# Patient Record
Sex: FEMALE | Race: WHITE
[De-identification: names, ages, dates, MRNs, and addresses within clinical notes are randomized per-mention and may not be internally consistent; named-entity substitution may affect disease eponyms.]

---

## 2017-04-03 NOTE — PCM.OPNOTE
- General Post-Op/Procedure Note


Date of Surgery/Procedure: 04/03/17


Operative Procedure(s): Colonoscopy with Polypectomy


Findings: 





Multiple colon polyps


Moderate Sigmoid Diverticulosis


Pre Op Diagnosis: Family History of Colon Cancer.  Personal History of Colon 

Polyps


Post-Op Diagnosis: Colon Polyps.  Diverticulosis


Anesthesia Technique: MAC


Primary Surgeon: Chance Jessica


Pathology: 





Colon Polyps


Output, Urine Amount: 0


EBL in mLs: 0


Complications: None


Condition: Good

## 2017-04-03 NOTE — OR
DATE OF OPERATION:  04/03/2017

 

SURGEON:  Chance Jessica MD

 

PREOPERATIVE DIAGNOSIS:  Family history of colon cancer and personal history of

colon polyps.

 

POSTOPERATIVE DIAGNOSIS:  Colon polyps and sigmoid diverticulosis.

 

OPERATION PERFORMED:  Colonoscopy with polypectomy.

 

INDICATIONS FOR SURGERY:  This 71-year-old female comes for a colonoscopy.  Her

last colonoscopy was over 10 years ago.  She does have a known family history of

colon cancer in a first-degree relative.

 

FINDINGS:  Multiple polyps are noted during the exam.  A cluster of two small

polyps each 4-5 mm in size was noted at the hepatic flexure.  A larger

pedunculated polyp of approximately 12 mm in size was noted at the splenic

flexure.  A sessile polyp of 7 mm noted in the descending colon 50 cm from the 
anal

verge and a 5 mm sessile polyp was noted at the sigmoid region 35 cm from the

anal verge.  The patient has a moderate degree of sigmoid diverticulosis.  This

does not appear acutely inflamed.  However, there is significant acute

angulation in the sigmoid colon.

 

PROCEDURE IN DETAIL:  The patient was taken to the operating room.  She was

given intravenous sedation, and with her in the left lateral decubitus position,

digital rectal exam was performed showing no rectal masses.  The Olympus

colonoscope was inserted into the rectum.  Retroflexed examination of the rectal

canal was performed.  The scope was then carefully advanced under direct

visualization.  The scope was carefully advanced through the sigmoid region,

this was somewhat difficult because of angulation, but was able to be safely

accomplished and then the scope was further advanced into the descending colon

where the large above-described polyp is noted.  This polyp was removed with a

cautery snare, but was too large to suction through the scope into the trap.

This polyp was then left in position, and the scope was advanced through the

remaining segments of the colon until the cecum was reached.  Cecal acquisition

was confirmed by noting the normal internal cecal anatomy including the

appendiceal orifice and ileocecal valve.  The light was also noted to

transilluminate the abdominal wall in the right lower quadrant.  After carefully

examining the cecum, the scope was withdrawn back into the region of the hepatic

flexure where a cluster of two small polyps were identified.  These polyps were

removed with a cautery snare and retrieved into a polyp trap.  The scope was

then further withdrawn and examining the transverse colon, and then back into

the descending colon.  The polyps in the splenic flexure and the sigmoid colon

were removed and able to be retrieved.  As the scope was withdrawn, the large

previously disconnected polyp was visualized, held on the end of the scope with

suction and withdrawn and retrieved.  The scope was then reinserted, examination

up to the descending colon was carried out.  The polypectomy sites were

carefully examined, and no sign of complication was noted.  Examination did not

show any other pathology other than the diverticulosis.  After the entire colon

had been fully examined and all visualized polyps were removed and retrieved and

with no sign of any complicating process, the scope was removed and the patient

was then taken from the operating room in satisfactory condition.

 

ESTIMATED BLOOD LOSS:  Zero.

 

COMPLICATIONS:  None.

 

PROGNOSIS:  Good.

 

Job#: [id number]

DD: 04/03/2017 0918

DT: 04/03/2017 1338 DI/TARAH FRYE

## 2017-04-03 NOTE — PCM.PN
- General Info


Date of Service: 04/03/17





- Review of Systems


Systems Review Comment:: 





72 y/o female with history of colon polyps and family history of colon cancer 

here for colonoscopy.  Her last colonoscopy was in 1998.  She denies any recent 

symptoms or changes in bowel pattern.  She is medically stable to proceed with 

no recent changes to her health status.  She agrees to proceed accepting risks.





- Patient Data


Vitals - most recent: 


 Last Vital Signs











Temp  97.9 F   04/03/17 07:20


 


Pulse  80   04/03/17 07:20


 


Resp  16   04/03/17 07:20


 


BP  125/78   04/03/17 07:20


 


Pulse Ox  100   04/03/17 07:20











Weight - most recent: 190 lb


Med Orders - Current: 


 Current Medications





Lactated Ringer's (Ringers, Lactated)  1,000 mls @ 125 mls/hr IV ASDIRECTED WILIAM


   Last Admin: 04/03/17 08:09 Dose:  125 mls/hr


Sodium Chloride (Saline Flush)  10 ml FLUSH ASDIRECTED PRN


   PRN Reason: Keep Vein Open











- Problem List Review


Problem List Initiated/Reviewed/Updated: Yes





- My Orders


Last 24 Hours: 


My Active Orders





04/03/17 06:45


Patient Status [ADT] Routine 


Verify Patient Consent Obtain [RC] ASDIRECTED 


Lactated Ringers [Ringers, Lactated] 1,000 ml IV ASDIRECTED 


Sodium Chloride 0.9% [Saline Flush]   10 ml FLUSH ASDIRECTED PRN 


Peripheral IV Insertion Adult [OM.PC] Routine 





04/03/17 Breakfast


Nothing Per Oral Diet [DIET] 














- Assessment


Assessment:: 





Family history of colon cancer


History of colon polyps





- Plan


Plan:: 





colonoscopy

## 2021-01-01 ENCOUNTER — HOSPITAL ENCOUNTER (EMERGENCY)
Dept: HOSPITAL 7 - FB.ED | Age: 76
Discharge: HOME | End: 2021-01-01
Payer: MEDICARE

## 2021-01-01 VITALS — HEART RATE: 80 BPM | SYSTOLIC BLOOD PRESSURE: 129 MMHG | DIASTOLIC BLOOD PRESSURE: 74 MMHG

## 2021-01-01 DIAGNOSIS — Z88.5: ICD-10-CM

## 2021-01-01 DIAGNOSIS — Z90.49: ICD-10-CM

## 2021-01-01 DIAGNOSIS — E03.9: ICD-10-CM

## 2021-01-01 DIAGNOSIS — K21.9: ICD-10-CM

## 2021-01-01 DIAGNOSIS — Z79.899: ICD-10-CM

## 2021-01-01 DIAGNOSIS — Z91.018: ICD-10-CM

## 2021-01-01 DIAGNOSIS — E66.9: ICD-10-CM

## 2021-01-01 DIAGNOSIS — Z88.8: ICD-10-CM

## 2021-01-01 DIAGNOSIS — M54.42: Primary | ICD-10-CM

## 2021-01-01 DIAGNOSIS — J45.909: ICD-10-CM

## 2021-01-01 NOTE — EDM.PDOC
ED HPI GENERAL MEDICAL PROBLEM





- General


Chief Complaint: Back Pain or Injury


Stated Complaint: LOWER BACK AND SIDE PAIN


Time Seen by Provider: 01/01/21 09:04


Source of Information: Reports: Patient


History Limitations: Reports: No Limitations





- History of Present Illness


INITIAL COMMENTS - FREE TEXT/NARRATIVE: 


Melinda complains of exquisite pain on the left flank,lower back,radiation to the 

left leg.Started several days ago and has not responded to Chiropractic 

manipulation. Nothing is helping.,Any movement makes it worse. No fever,UTI 

symptoms or neurological deficits


  ** lower back


Pain Score (Numeric/FACES): 2





- Related Data


                                    Allergies











Allergy/AdvReac Type Severity Reaction Status Date / Time


 


banana [Banana] Allergy  Respiratory Verified 04/03/17 07:48





   Depression  


 


corn [[place]] Allergy  Respiratory Verified 04/03/17 07:48





   Depression  


 


iodine Allergy  Rash Verified 04/03/17 07:48


 


oxycodone [Oxycodone] Allergy  Itching Verified 04/03/17 07:48


 


shellfish derived Allergy  Rash Verified 04/03/17 07:48


 


wheat Allergy  Respiratory Verified 04/03/17 07:48





   Depression  











Home Meds: 


                                    Home Meds





Albuterol/Ipratropium [DuoNeb 3.0-0.5 MG/3 ML] 0.5 - 2.5 mg INH QID 03/31/17 

[History]


Budesonide [Pulmicort] 0.5 mg IH BID 03/31/17 [History]


Levothyroxine Sodium 50 mcg PO DAILY 03/31/17 [History]


Meloxicam 15 mg PO DAILY PRN 03/31/17 [History]


Montelukast [Singulair] 10 mg PO BEDTIME 03/31/17 [History]


Omeprazole Magnesium [Prilosec Otc] 20 mg PO DAILY 03/31/17 [History]











Past Medical History


HEENT History: Reports: Cataract


Cardiovascular History: 


Respiratory History: Reports: Asthma


Gastrointestinal History: Reports: Colon Polyp, GERD


Genitourinary History: Reports: None


OB/GYN History: Reports: Pregnancy


Musculoskeletal History: Reports: Osteoarthritis, Other (See Below)


Other Musculoskeletal History: DJD


Neurological History: Reports: None


Psychiatric History: Reports: None


Endocrine/Metabolic History: Reports: Hypothyroidism, Obesity/BMI 30+


Hematologic History: Reports: Iron Deficiency


Immunologic History: Reports: None


Oncologic (Cancer) History: Reports: None


Dermatologic History: Reports: None





- Past Surgical History


HEENT Surgical History: Reports: Cataract Surgery, Tonsillectomy


Musculoskeletal Surgical History: Reports: Arthroscopic Knee





Social & Family History





- Caffeine Use


Caffeine Use: Reports: Coffee, Tea





ED ROS GENERAL





- Review of Systems


Review Of Systems: Comprehensive ROS is negative, except as noted in HPI.





ED EXAM,LOWER BACK PAIN/INJURY





- Physical Exam


Exam: See Below


Exam Limited By: No Limitations


General Appearance: Alert


Ears: Normal External Exam


Respiratory/Chest: No Respiratory Distress


Back Exam: Normal Inspection, Muscle Spasm, Vertebral Tenderness


Extremities: Normal Inspection


Neurological: Alert, CN II-XII Intact





Course





- Vital Signs


Last Recorded V/S: 


                                Last Vital Signs











Temp  97.8 F   01/01/21 10:49


 


Pulse  80   01/01/21 10:49


 


Resp  17   01/01/21 10:49


 


BP  129/74   01/01/21 10:49


 


Pulse Ox  97   01/01/21 10:49














- Orders/Labs/Meds


Meds: 


Medications














Discontinued Medications














Generic Name Dose Route Start Last Admin





  Trade Name Makenzie  PRN Reason Stop Dose Admin


 


Hydroxyzine HCl  50 mg  01/01/21 08:58  01/01/21 09:05





  Vistaril  IM  01/01/21 08:59  50 mg





  ONETIME ONE   Administration


 


Meperidine HCl  100 mg  01/01/21 08:58  01/01/21 09:05





  Demerol  IM  01/01/21 08:59  100 mg





  ONETIME ONE   Administration














Departure





- Departure


Time of Disposition: 16:27


Disposition: Home, Self-Care 01


Condition: Good


Clinical Impression: 


 Sciatica








- Discharge Information


Instructions:  Cyclobenzaprine tablets, Chronic Back Pain


Referrals: 


Strand,Duane, MD [Primary Care Provider] - 


Forms:  ED Department Discharge


Additional Instructions: 


Take the Flexeril 1 tab 3 x day for back pain and spasm.


Follow up with your Primary Care Provider as needed.


Call if you have any questions or come back to the ER if symptoms get acutely 

worse.





Sepsis Event Note (ED)





- Evaluation


Sepsis Screening Result: No Definite Risk





- Focused Exam


Vital Signs: 


                                   Vital Signs











  Temp Pulse Resp BP Pulse Ox


 


 01/01/21 10:49  97.8 F  80  17  129/74  97


 


 01/01/21 08:58  97.6 F  87  16  152/80 H  100














- Problem List & Annotations


(1) Sciatica


SNOMED Code(s): 97243521


   Code(s): M54.30 - SCIATICA, UNSPECIFIED SIDE   Status: Acute   


Qualifiers: 


   Laterality: left   Qualified Code(s): M54.32 - Sciatica, left side   





- Problem List Review


Problem List Initiated/Reviewed/Updated: Yes





- My Orders


Last 24 Hours: 


Demerol 100 mg IM and Vistaril 50 IM





- Assessment/Plan


Plan: 


Demerol and Vistaril IM. Pain is relieved. Home with NSAIDs and Flexeril.

## 2021-01-08 ENCOUNTER — HOSPITAL ENCOUNTER (EMERGENCY)
Dept: HOSPITAL 7 - FB.ED | Age: 76
Discharge: NURSING, REHAB OR LONG-TERM CARE | End: 2021-01-08
Payer: MEDICARE

## 2021-01-08 VITALS — HEART RATE: 103 BPM | DIASTOLIC BLOOD PRESSURE: 75 MMHG | SYSTOLIC BLOOD PRESSURE: 151 MMHG

## 2021-01-08 DIAGNOSIS — E66.9: ICD-10-CM

## 2021-01-08 DIAGNOSIS — J45.909: ICD-10-CM

## 2021-01-08 DIAGNOSIS — Z88.5: ICD-10-CM

## 2021-01-08 DIAGNOSIS — S72.142A: Primary | ICD-10-CM
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
## 2021-01-08 DIAGNOSIS — Z91.048: ICD-10-CM

## 2021-01-08 DIAGNOSIS — M19.90: ICD-10-CM

## 2021-01-08 DIAGNOSIS — Z79.899: ICD-10-CM

## 2021-01-08 DIAGNOSIS — S42.212A: ICD-10-CM

## 2021-01-08 DIAGNOSIS — Z91.018: ICD-10-CM

## 2021-01-08 DIAGNOSIS — E03.9: ICD-10-CM

## 2021-01-08 DIAGNOSIS — K21.9: ICD-10-CM

## 2021-01-08 PROCEDURE — U0002 COVID-19 LAB TEST NON-CDC: HCPCS

## 2021-01-08 NOTE — EDM.PDOC
ED HPI GENERAL MEDICAL PROBLEM





- General


Time Seen by Provider: 01/08/21 11:10


Source of Information: Reports: Patient, Family


History Limitations: Reports: No Limitations





- History of Present Illness


INITIAL COMMENTS - FREE TEXT/NARRATIVE: 





c/o fall with pain in L shoulder and L hip





pt states she was stretching 11/20 and felt something pull in her L shoulder and

L hip





PCP Dr Hayes is out of town





saw Krystina Lewis who imaged her back (altho pt not sure where), no images of L 

shoulder or L hip, dx with a muscle problem, tx meloxicam and tramadol, sent to 

PT





pt took a dose of tramadol at 10p and 2a which helped, took a dose of meloxicam 

this AM





in the late morning she was leaving her apartment with her  to go to PT 

and stumbled on the steps, did not fall, aggravated the pain in her L shoulder 

and hip,  said he could not get her up for 30 minutes, then got her in 

the car with the help of neighbors. He drove to PT who sent her here to ED





pt denies back pain, says her L hip and L shoulder hurt all over, when asked to 

point with one finger at her shoulder she points to proximal humerus laterally, 

not the joint line


  ** L shoulder, L hip & thigh


Pain Score (Numeric/FACES): 10





- Related Data


                                    Allergies











Allergy/AdvReac Type Severity Reaction Status Date / Time


 


banana [Banana] Allergy  Respiratory Verified 01/08/21 11:20





   Depression  


 


corn [[place]] Allergy  Respiratory Verified 01/08/21 11:20





   Depression  


 


iodine Allergy  Rash Verified 01/08/21 11:20


 


oxycodone [Oxycodone] Allergy  Itching Verified 01/08/21 11:20


 


shellfish derived Allergy  Rash Verified 01/08/21 11:20


 


wheat Allergy  Respiratory Verified 01/08/21 11:20





   Depression  











Home Meds: 


                                    Home Meds





Albuterol/Ipratropium [DuoNeb 3.0-0.5 MG/3 ML] 0.5 - 2.5 mg INH QID 03/31/17 

[History]


Budesonide [Pulmicort] 0.5 mg IH BID 03/31/17 [History]


Levothyroxine Sodium 50 mcg PO DAILY 03/31/17 [History]


Meloxicam 15 mg PO DAILY PRN 03/31/17 [History]


Montelukast [Singulair] 10 mg PO BEDTIME 03/31/17 [History]


Omeprazole Magnesium [Prilosec Otc] 20 mg PO DAILY PRN 03/31/17 [History]


Acetaminophen [Tylenol Arthritis Pain] 1,300 mg BEDTIME 01/08/21 [History]


traMADol [Ultram] 50 mg PO Q6H PRN 01/08/21 [History]











Past Medical History


HEENT History: Reports: Cataract


Cardiovascular History: 


Respiratory History: Reports: Asthma


Gastrointestinal History: Reports: Colon Polyp, GERD


Genitourinary History: Reports: None


OB/GYN History: Reports: Pregnancy


Musculoskeletal History: Reports: Osteoarthritis, Other (See Below)


Other Musculoskeletal History: DJD


Neurological History: Reports: None


Psychiatric History: Reports: None


Endocrine/Metabolic History: Reports: Hypothyroidism, Obesity/BMI 30+


Hematologic History: Reports: Iron Deficiency


Immunologic History: Reports: None


Oncologic (Cancer) History: Reports: None


Dermatologic History: Reports: None





- Past Surgical History


HEENT Surgical History: Reports: Cataract Surgery, Tonsillectomy


Musculoskeletal Surgical History: Reports: Arthroscopic Knee





Social & Family History





- Family History


Family Medical History: No Pertinent Family History





- Caffeine Use


Caffeine Use: Reports: Coffee, Tea





ED ROS GENERAL





- Review of Systems


Review Of Systems: See Below


Constitutional: Reports: No Symptoms


HEENT: Reports: No Symptoms


Respiratory: Reports: No Symptoms


Cardiovascular: Reports: No Symptoms


Endocrine: Reports: No Symptoms


GI/Abdominal: Reports: No Symptoms


: Reports: No Symptoms


Musculoskeletal: Reports: Joint Pain


Skin: Reports: No Symptoms


Neurological: Reports: No Symptoms


Psychiatric: Reports: No Symptoms


Hematologic/Lymphatic: Reports: No Symptoms


Immunologic: Reports: No Symptoms





ED EXAM, GENERAL





- Physical Exam


Exam: See Below


Exam Limited By: No Limitations


General Appearance: Alert, WD/WN


Nose: Normal Inspection


Head: Atraumatic, Normocephalic


Neck: Normal Inspection, Supple, Non-Tender, Full Range of Motion.  No: 

Lymphadenopathy (R), Lymphadenopathy (L)


Respiratory/Chest: No Respiratory Distress, Lungs Clear


Cardiovascular: Regular Rate, Rhythm


GI/Abdominal: Soft, Non-Tender


Back Exam: Normal Inspection.  No: CVA Tenderness (R), CVA Tenderness (L)


Extremities: Normal Range of Motion, Non-Tender


Neurological: Alert, Oriented, CN II-XII Intact, Normal Cognition, No Motor

/Sensory Deficits


Psychiatric: Anxious


Skin Exam: Warm, Dry, Intact, Normal Color, No Rash


Lymphatic: No Adenopathy





Course





- Vital Signs


Last Recorded V/S: 


                                Last Vital Signs











Temp  36.7 C   01/08/21 10:56


 


Pulse  103 H  01/08/21 15:00


 


Resp  20   01/08/21 15:00


 


BP  151/75 H  01/08/21 15:00


 


Pulse Ox  96   01/08/21 15:00














- Orders/Labs/Meds


Orders: 


                               Active Orders 24 hr











 Category Date Time Status


 


 Insert Urinary Catheter [OM.PC] Q24H Care  01/08/21 13:15 Ordered


 


 Peripheral IV Insertion Adult [OM.PC] Routine Oth  01/08/21 11:37 Ordered











Labs: 


                                Laboratory Tests











  01/08/21 01/08/21 01/08/21 Range/Units





  11:40 11:40 11:40 


 


WBC  8.8    (3.0-10.3)  x10-3/uL


 


RBC  4.20    (3.60-5.20)  x10(6)uL


 


Hgb  12.4    (11.4-15.5)  g/dL


 


Hct  37.2    (34.2-48.2)  %


 


MCV  88.6    (76.7-100.5)  fL


 


MCH  29.4    (23.9-33.9)  pg


 


MCHC  33.2    (31.9-34.8)  g/dL


 


RDW  14.3    (12.3-16.5)  %


 


Plt Count  194    (151-488)  x10(3)uL


 


MPV  7.8    (7.1-12.4)  fL


 


Neut % (Auto)  74.9    (30.8-76.2)  %


 


Lymph % (Auto)  12.8 L    (18.4-52.1)  %


 


Mono % (Auto)  9.8    (4.4-15.7)  %


 


Eos % (Auto)  1.6    (0.6-8.1)  %


 


Baso % (Auto)  0.9    (0.2-1.5)  %


 


Neut # (Auto)  6.6 H    (1.5-6.3)  x10-3/uL


 


Lymph # (Auto)  1.1    (1.0-4.4)  x10-3/uL


 


Mono # (Auto)  0.9    (0.3-1.0)  x10-3/uL


 


Eos # (Auto)  0.1    (0.0-0.8)  x10-3/uL


 


Baso # (Auto)  0.1    (0.0-0.1)  x10-3/uL


 


Sodium   136   (135-145)  mmol/L


 


Potassium   4.1   (3.5-5.3)  mmol/L


 


Chloride   99 L   (100-110)  mmol/L


 


Carbon Dioxide   21   (21-32)  mmol/L


 


BUN   19 H   (7-18)  mg/dL


 


Creatinine   0.9   (0.55-1.02)  mg/dL


 


Est Cr Clr Drug Dosing   39.99   mL/min


 


Estimated GFR (MDRD)   > 60   (>60)  


 


BUN/Creatinine Ratio   21.1 H   (9-20)  


 


Glucose   122 H   ()  mg/dL


 


Calcium   10.1   (8.6-10.2)  mg/dL


 


Total Bilirubin   0.5   (0.1-1.3)  mg/dL


 


AST   44 H   (5-25)  IU/L


 


ALT   31   (12-36)  U/L


 


Alkaline Phosphatase   93   ()  IU/L


 


C-Reactive Protein    2.9 H*  (0.5-0.9)  mg/dL


 


Total Protein   7.1   (6.0-8.0)  g/dL


 


Albumin   4.1   (3.2-4.6)  g/dL


 


Globulin   3.0   g/dL


 


Albumin/Globulin Ratio   1.4   


 


Urine Color     (YELLOW)  


 


Urine Appearance     (CLEAR)  


 


Urine pH     (5.0-6.5)  


 


Ur Specific Gravity     (1.010-1.025)  


 


Urine Protein     (NEGATIVE)  mg/dL


 


Urine Glucose (UA)     (NORMAL)  mg/dL


 


Urine Ketones     (NEGATIVE)  mg/dL


 


Urine Occult Blood     (NEGATIVE)  


 


Urine Nitrite     (NEGATIVE)  


 


Urine Bilirubin     (NEGATIVE)  


 


Urine Urobilinogen     (NEGATIVE)  mg/dL


 


Ur Leukocyte Esterase     (NEGATIVE)  


 


Urine WBC     (0-5)  


 


Ur Squamous Epith Cells     (NS,R,O)  


 


Urine Bacteria     (NS)  


 


SARS-CoV-2 RNA (CATHERINE)     (NEGATIVE)  














  01/08/21 01/08/21 Range/Units





  13:10 13:30 


 


WBC    (3.0-10.3)  x10-3/uL


 


RBC    (3.60-5.20)  x10(6)uL


 


Hgb    (11.4-15.5)  g/dL


 


Hct    (34.2-48.2)  %


 


MCV    (76.7-100.5)  fL


 


MCH    (23.9-33.9)  pg


 


MCHC    (31.9-34.8)  g/dL


 


RDW    (12.3-16.5)  %


 


Plt Count    (151-488)  x10(3)uL


 


MPV    (7.1-12.4)  fL


 


Neut % (Auto)    (30.8-76.2)  %


 


Lymph % (Auto)    (18.4-52.1)  %


 


Mono % (Auto)    (4.4-15.7)  %


 


Eos % (Auto)    (0.6-8.1)  %


 


Baso % (Auto)    (0.2-1.5)  %


 


Neut # (Auto)    (1.5-6.3)  x10-3/uL


 


Lymph # (Auto)    (1.0-4.4)  x10-3/uL


 


Mono # (Auto)    (0.3-1.0)  x10-3/uL


 


Eos # (Auto)    (0.0-0.8)  x10-3/uL


 


Baso # (Auto)    (0.0-0.1)  x10-3/uL


 


Sodium    (135-145)  mmol/L


 


Potassium    (3.5-5.3)  mmol/L


 


Chloride    (100-110)  mmol/L


 


Carbon Dioxide    (21-32)  mmol/L


 


BUN    (7-18)  mg/dL


 


Creatinine    (0.55-1.02)  mg/dL


 


Est Cr Clr Drug Dosing    mL/min


 


Estimated GFR (MDRD)    (>60)  


 


BUN/Creatinine Ratio    (9-20)  


 


Glucose    ()  mg/dL


 


Calcium    (8.6-10.2)  mg/dL


 


Total Bilirubin    (0.1-1.3)  mg/dL


 


AST    (5-25)  IU/L


 


ALT    (12-36)  U/L


 


Alkaline Phosphatase    ()  IU/L


 


C-Reactive Protein    (0.5-0.9)  mg/dL


 


Total Protein    (6.0-8.0)  g/dL


 


Albumin    (3.2-4.6)  g/dL


 


Globulin    g/dL


 


Albumin/Globulin Ratio    


 


Urine Color  Yellow   (YELLOW)  


 


Urine Appearance  Clear   (CLEAR)  


 


Urine pH  7.0 H   (5.0-6.5)  


 


Ur Specific Gravity  1.020   (1.010-1.025)  


 


Urine Protein  Negative   (NEGATIVE)  mg/dL


 


Urine Glucose (UA)  Normal   (NORMAL)  mg/dL


 


Urine Ketones  50 H   (NEGATIVE)  mg/dL


 


Urine Occult Blood  Negative   (NEGATIVE)  


 


Urine Nitrite  Negative   (NEGATIVE)  


 


Urine Bilirubin  Negative   (NEGATIVE)  


 


Urine Urobilinogen  Normal   (NEGATIVE)  mg/dL


 


Ur Leukocyte Esterase  Negative   (NEGATIVE)  


 


Urine WBC  0-5   (0-5)  


 


Ur Squamous Epith Cells  Few H   (NS,R,O)  


 


Urine Bacteria  Few H   (NS)  


 


SARS-CoV-2 RNA (CATHERINE)   Negative  (NEGATIVE)  











Meds: 


Medications














Discontinued Medications














Generic Name Dose Route Start Last Admin





  Trade Name Freq  PRN Reason Stop Dose Admin


 


Hydromorphone HCl  0.5 mg  01/08/21 12:57  01/08/21 13:10





  Dilaudid  IVPUSH  01/08/21 12:58  0.5 mg





  ONETIME ONE   Administration


 


Hydromorphone HCl  0.5 mg  01/08/21 14:27  01/08/21 14:45





  Dilaudid  IVPUSH  01/08/21 14:28  0.5 mg





  ONETIME ONE   Administration


 


Sodium Chloride  1,000 mls @ 999 mls/hr  01/08/21 14:28  01/08/21 14:45





  Normal Saline  IV  01/08/21 15:28  999 mls/hr





  .BOLUS ONE   Administration


 


Acetaminophen 1,000 mg/ Premix  100 mls @ 400 mls/hr  01/08/21 14:36  01/08/21 

14:49





  IV  01/08/21 14:50  400 mls/hr





  NOW ONE   Administration


 


Morphine Sulfate  2 mg  01/08/21 11:25  01/08/21 11:33





  Morphine  IVPUSH  01/08/21 11:26  2 mg





  ONETIME ONE   Administration


 


Ondansetron HCl  4 mg  01/08/21 11:26  01/08/21 11:36





  Zofran  IVPUSH  01/08/21 11:27  4 mg





  ONETIME ONE   Administration


 


Sodium Chloride  10 ml  01/08/21 11:37  01/08/21 11:27





  Saline Flush  FLUSH   10 ml





  ASDIRECTED PRN   Administration





  Keep Vein Open  














- Re-Assessments/Exams


Free Text/Narrative Re-Assessment/Exam: 





01/08/21 14:30


Dr Johnson called back from  and accepted pt as a direct admission





mechanism of injury remains a little unclear





pt reports she twisted in bed in mid Nov and has had pain in her L shoulder and 

hip every since, she thinks "something went out of joint"





did get benefit from a muscle relaxant, tramadol and meloxicam did not seem to 

help





did see Jefferson from PT at [facility] 4d ago and 2d ago, he reported that she

 walked with a limp but was not using a walker or cane





pt had inc'd pain in L hip and shoulder today, not able to sleep much, too much 

pain to eat much (ketone 50 mg/dl in urine), she was planning on coming to ED 

after going to PT this morning but never made it to PT as she stumbled on the 

stairs when leaving the house





it sounds as if she had a fx of her L hip, then fell, did report landing on her 

L shoulder and had inc'd pain there





CRP inc'd for unclear reason, no infection on u/a (thorne placed), CxR deferred 





pt will need to be evaluated for possible pathological fx as suggested by inc'd 

CRP, may have had a partial subacute hairline fx and both locations and 

completed the fx today, no prior imaging





feeling much better after pain meds 





Departure





- Departure


Time of Disposition: 15:00


Disposition: DC/Tfer to [facility]


Condition: Fair


Clinical Impression: 


 Intertrochanteric fracture, hip, Closed fracture of left proximal humerus








- Discharge Information


*PRESCRIPTION DRUG MONITORING PROGRAM REVIEWED*: Not Applicable


*COPY OF PRESCRIPTION DRUG MONITORING REPORT IN PATIENT ALEK: Not Applicable


Referrals: 


Strand,Duane, MD [Primary Care Provider] - 


Forms:  ED Department Discharge





Sepsis Event Note (ED)





- Focused Exam


Vital Signs: 


                                   Vital Signs











  Pulse Resp BP Pulse Ox


 


 01/08/21 15:00  103 H  20  151/75 H  96














- My Orders


Last 24 Hours: 


My Active Orders





01/08/21 11:37


Peripheral IV Insertion Adult [OM.PC] Routine 





01/08/21 13:15


Insert Urinary Catheter [OM.PC] Q24H 














- Assessment/Plan


Last 24 Hours: 


My Active Orders





01/08/21 11:37


Peripheral IV Insertion Adult [OM.PC] Routine 





01/08/21 13:15


Insert Urinary Catheter [OM.PC] Q24H

## 2021-01-08 NOTE — CR
INDICATION:  Chronic pain x2 months.  Fall today.  Increased pain.  



LEFT SHOULDER:  Three images of the left shoulder were obtained and revealed a 
transverse comminuted fracture through the surgical neck of the humerus with 
medial offset of just under a centimeter of the distal fracture fragment and 
suggestion of anterior angulation and some anterior offset, possibly 1 cm of the
distal fracture fragment.  



Demineralization is suggested, compatible with osteoporosis, but should be 
correlated clinically. 



IMPRESSION:  Left humeral fracture with deformity.  

MTDD

## 2021-01-08 NOTE — CR
INDICATION:  Chronic pain x2 months.  Fell today with increased pain.  



LEFT HIP:  A single frontal view of the pelvis with frontal and lateral views of
the left hip were obtained 01/08/21 - no comparisons.  



A slightly comminuted fracture is noted at the base of the left femoral neck 
extending into the intertrochanteric area slightly.  



Mild degenerative changes are noted at the hip joints, but the joint space is 
fairly well preserved.  



Sacroiliac joints show some mild degenerative changes.  



Degenerative disk disease is suggested at L4-5 and possibly L3-4.  



No other fracture or dislocation was seen. 



IMPRESSION:  Fracture through the base of the femoral neck with mild to moderate
deformity.  

MTDD

## 2021-01-08 NOTE — CR
INDICATION:  Fall, pain in left hip.  



LUMBOSACRAL SPINE:  Frontal and lateral views of the lumbosacral spine revealed 
decreased disk space at L3-4 and L4-5 with some hypertrophic degenerative 
changes of mild to moderate degree at L3-4.  



A very minimal anterolisthesis is noted at L4-5.  



Vertebral body and disk heights were otherwise well maintained without a 
definite acute fracture or dislocation at the lumbosacral spine. 



IMPRESSION:  Degenerative changes and disk disease - no acute fracture or 
dislocation.  

MTDD

## 2021-01-12 NOTE — PCM.HP.2
H&P History of Present Illness





- General


Date of Service: 21


Admit Problem/Dx: 


                           Admission Diagnosis/Problem





Admission Diagnosis/Problem      Rehabilitation therapy








Source of Information: Patient, Old Records, Provider





- History of Present Illness


Initial Comments - Free Text/Narative: 





Melinda arrived today for swing bed admission for left humeral fracture and Left 

intertrochanteric hip fracture sustained on 2021. Had been having chronic

pain in her hip and shoulder from a fall , had been doing PT locally when 

on Friday, she stumbled on the steps, did not fall but had worsening of her pain

to point her  couldn't get her into the car. She was brought to [facility] where x-rays found left humeral fracture/left hip fractures, sent up 

to [facility], where it was surgical repaired. Hemoglobin 9.1 yesterday. No 

electrolyte abnormalities report by [facility] Hospitalist. She had her DuoNebs 

given 3 times a day in [place] but she states she takes 4 times a day at home, she

is feeling a little tight right now but not wheezing. She has been passing gas, 

but not had any stools since prior to surgery. States she wasn't able to eat 

much until yesterday. She had an immediate release Morphine 1140 this morning 

prior to leaving [place]. Left hip fracture was felt to be pathologic due to 

neoplastic disease, pathology from bone biopsy is pending, UPEP/SPEP are also 

pending. Heme/Onc saw her this morning and will be following up with them as 

outpatient. 


  ** Left Shoulder


Pain Score (Numeric/FACES): 3





  ** Left Hip


Pain Score (Numeric/FACES): 2





- Related Data


Allergies/Adverse Reactions: 


                                    Allergies











Allergy/AdvReac Type Severity Reaction Status Date / Time


 


banana [Banana] Allergy  Respiratory Verified 21 11:20





   Depression  


 


corn [[place]] Allergy  Respiratory Verified 21 11:20





   Depression  


 


iodine Allergy  Rash Verified 21 11:20


 


oxycodone [Oxycodone] Allergy  Itching Verified 21 11:20


 


shellfish derived Allergy  Rash Verified 21 11:20


 


wheat Allergy  Respiratory Verified 21 11:20





   Depression  











Home Medications: 


                                    Home Meds





Albuterol/Ipratropium [DuoNeb 3.0-0.5 MG/3 ML] 3 ml INH QID 17 [History]


Budesonide [Pulmicort] 0.5 mg IH BID 17 [History]


Levothyroxine Sodium 50 mcg PO DAILY 17 [History]


Montelukast [Singulair] 10 mg PO BEDTIME 17 [History]


Omeprazole Magnesium [Prilosec Otc] 20 mg PO DAILY PRN 17 [History]


traMADol [Ultram] 50 mg PO Q6H PRN 21 [History]


Calcium Carbonate/Vitamin D3 [Calcium 600-Vit D3 200 Tablet] 1 tab PO BID 

21 [History]


Enoxaparin Sodium [Lovenox] 40 mg SUBCUT BEDTIME 21 [History]


Ferrous Sulfate 325 mg PO DAILY 21 [History]


Morphine 15 mg PO Q4H PRN 21 [History]











Past Medical History


HEENT History: Reports: Cataract


Cardiovascular History: 


Respiratory History: Reports: Asthma


Gastrointestinal History: Reports: Colon Polyp, GERD


Genitourinary History: Reports: None


OB/GYN History: Reports: Pregnancy


Other OB/BYN History: 


Musculoskeletal History: Reports: Arthritis, Fracture (closed intertrochanteric 

fracture of left hip, left humeral fracture-21), Osteoarthritis, Other (See 

Below)


Other Musculoskeletal History: DJD


Neurological History: Reports: None


Psychiatric History: Reports: None


Endocrine/Metabolic History: Reports: Hypothyroidism, Obesity/BMI 30+


Hematologic History: Reports: Iron Deficiency


Immunologic History: Reports: None


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 

disease 21


Dermatologic History: Reports: None





- Infectious Disease History


Infectious Disease History: Reports: Chicken Pox, Measles, Mumps





- Past Surgical History


Head Surgeries/Procedures: Reports: None


HEENT Surgical History: Reports: Adenoidectomy, Cataract Surgery, Tonsillectomy


Other HEENT Surgeries/Procedures: bilat cataract


GI Surgical History: Reports: Colonoscopy


Female  Surgical History: Reports: Hysterectomy, Salpingo-Oophorectomy


Musculoskeletal Surgical History: Reports: Arthroscopic Knee, Knee Replacement, 

Other (See Below)


Other Musculoskeletal Surgeries/Procedures:: partial R knee replacement, left 

hip replacement 21, left should fracture 21





Social & Family History





- Family History


Family Medical History: No Pertinent Family History





- Caffeine Use


Caffeine Use: Reports: Coffee





H&P Review of Systems





- Review of Systems:


Review Of Systems: See Below


General: Reports: No Symptoms


HEENT: Reports: No Symptoms


Pulmonary: Reports: Other (felt tight).  Denies: Shortness of Breath, Wheezing, 

Cough


Cardiovascular: Reports: No Symptoms


Gastrointestinal: Reports: Constipation, Decreased Appetite.  Denies: Abdominal 

Pain, Diarrhea, Nausea, Vomiting


Genitourinary: Reports: No Symptoms


Musculoskeletal: Reports: Shoulder Pain, Leg Pain


Skin: Reports: Wound


Psychiatric: Reports: No Symptoms


Neurological: Reports: No Symptoms


Hematologic/Lymphatic: Reports: Anemia





Exam





- Exam


Exam: See Below





- Vital Signs


Vital Signs: 


                                Last Vital Signs











Temp  97.8 F   21 13:26


 


Pulse  104 H  21 13:26


 


Resp  18   21 13:26


 


BP  109/84   21 13:26


 


Pulse Ox  99   21 13:26














- Exam


Quality Assessment: No: Supplemental Oxygen


General: Alert, Oriented, Cooperative.  No: Mild Distress


HEENT: PERRLA, Conjunctiva Clear, EOMI, Hearing Intact, Mucosa Moist & Pink, 

Glasses


Neck: Trachea Midline


Lungs: Clear to Auscultation (tight), Normal Respiratory Effort.  No: Decreased 

Breath Sounds, Wheezing


Cardiovascular: Regular Rate, Regular Rhythm


GI/Abdominal Exam: Normal Bowel Sounds, Soft, Non-Tender, No Distention


 (Female) Exam: Deferred


Rectal (Female) Exam: Deferred


Extremities: No Pedal Edema, Other (Left arm in sling. )


Peripheral Pulses: 2+: Radial (L), Radial (R)


Skin: Warm, Dry, Wound (Aquacell in place)





Sepsis Event Note





- Focused Exam


Vital Signs: 


                                   Vital Signs











  Temp Pulse Resp BP Pulse Ox


 


 21 13:26  97.8 F  104 H  18  109/84  99














*Q Meaningful Use (ADM)





- VTE Risk Assess *Q


Each Risk Factor Represents 1 Point: Serious lung disease including pneumonia 

(asthma)


Total Score 1 Point Risk Factors: 1


Each Risk Factor Represents 2 Points: Age 60 - 74 Years, Malignancy (present or 

previous)


Total Score 2 Point Risk Factors: 4


Each Risk Factor Represents 3 Points: None


Total Score 3 Point Risk Factors: 0


Each Risk Factor Represents 5 Points: Elective Major Lower Extremity 

Arthroplasty, Hip, Pelvis or Leg Fracture, Less than 1 month


Total Score 5 Point Risk Factors: 10


Venous Thromboembolism Risk Factor Score *Q: 15





- Problem List


(1) Intertrochanteric fracture, hip


SNOMED Code(s): 212233462


   ICD Code: S72.143A - DISPLACED INTERTROCHANTERIC FRACTURE OF UNSP FEMUR, INIT

   Status: Acute   Current Visit: No   Onset Date: ~21   





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


SNOMED Code(s): 911020657


   ICD Code: M84.50XA - PATHOLOGICAL FRACTURE IN NEOPLASTIC DISEASE, UNSP SITE, 

INIT   Status: Acute   Current Visit: Yes   


Qualifiers: 


   Site of pathological fracture: hip 





(3) Closed fracture of left proximal humerus


SNOMED Code(s): 33584313


   ICD Code: S42.202A - UNSP FRACTURE OF UPPER END OF LEFT HUMERUS, INIT FOR 

CLOS FX   Status: Acute   Current Visit: No   Onset Date: ~21   





(4) Asthma, moderate persistent


SNOMED Code(s): 358718663


   ICD Code: J45.40 - MODERATE PERSISTENT ASTHMA, UNCOMPLICATED   Status: 

Chronic   Current Visit: Yes   





(5) Sciatica


SNOMED Code(s): 45605869


   ICD Code: M54.30 - SCIATICA, UNSPECIFIED SIDE   Status: Chronic   Current 

Visit: No   


Qualifiers: 


 





(6) Esophageal reflux


SNOMED Code(s): 765053984


   ICD Code: K21.9 - GASTRO-ESOPHAGEAL REFLUX DISEASE WITHOUT ESOPHAGITIS   

Status: Chronic   Current Visit: Yes   





(7) Obesity (BMI 35.0-39.9 without comorbidity)


SNOMED Code(s): 800403543, 920633315


   ICD Code: E66.9 - OBESITY, UNSPECIFIED   Status: Chronic   Current Visit: Yes

   





(8) Hypothyroidism


SNOMED Code(s): 52197012


   ICD Code: E03.9 - HYPOTHYROIDISM, UNSPECIFIED   Status: Chronic   Current 

Visit: Yes   


Problem List Initiated/Reviewed/Updated: Yes


Orders Last 24hrs: 


                               Active Orders 24 hr











 Category Date Time Status


 


 Patient Status [ADT] Routine ADT  21 13:39 Ordered


 


 Height and Weight [RC] WEEKLY Care  21 13:39 Ordered


 


 Oxygen Therapy [RC] PRN Care  21 13:39 Ordered


 


 RT Aerosol Therapy [RC] ASDIRECTED Care  21 13:44 Ordered


 


 Up With Assistance [RC] ASDIRECTED Care  21 13:38 Ordered


 


 Up ad Rachel [RC] ASDIRECTED Care  21 13:38 Ordered


 


 VTE/DVT Education [RC] Per Unit Routine Care  21 13:39 Ordered


 


 Vital Signs [RC] PER UNIT ROUTINE Care  21 13:39 Ordered


 


 Regular Diet [DIET] Diet  21 Dinner Ordered


 


 Acetaminophen [Tylenol Extra Strength] Med  21 13:45 Ordered





 500 mg PO Q6H   


 


 Albuterol/Ipratropium [DuoNeb 3.0-0.5 MG/3 ML] Med  21 17:00 Ordered





 3 ml INH QID   


 


 Budesonide [Pulmicort] Med  21 21:00 Ordered





 0.5 mg INH BID   


 


 Calcium Carbonate/Vitamin D3 [Calcium 600-Vit D3 200 Med  21 21:00 

Ordered





 Tablet]   





 1 tab PO BID   


 


 Enoxaparin [Lovenox] Med  21 21:00 Ordered





 40 mg SUBCUT BEDTIME   


 


 Ferrous Sulfate Med  21 09:00 Ordered





 325 mg PO DAILY   


 


 Ibuprofen [Motrin] Med  21 13:45 Ordered





 200 mg PO Q6H   


 


 Levothyroxine [Synthroid] Med  21 09:00 Ordered





 50 mcg PO DAILY   


 


 Montelukast [Singulair] Med  21 21:00 Ordered





 10 mg PO BEDTIME   


 


 Morphine Med  21 13:41 Ordered





 15 mg PO Q4H PRN   


 


 Omeprazole Magnesium [Prilosec Otc] Med  21 13:41 Ordered





 20 mg PO DAILY PRN   


 


 traMADol [Ultram] Med  21 13:41 Ordered





 50 mg PO Q6H PRN   


 


 Antiembolic Hose [OM.PC] Per Unit Routine Oth  21 13:40 Ordered


 


 Resuscitation Status Routine Resus Stat  21 13:38 Ordered








                                Medication Orders





Acetaminophen (Tylenol Extra Strength)  500 mg PO Q6H WILIAM


Albuterol/Ipratropium (Duoneb 3.0-0.5 Mg/3 Ml)  3 ml INH QID WILIAM


Budesonide (Pulmicort)  0.5 mg INH BID WILIAM


Enoxaparin Sodium (Lovenox)  40 mg SUBCUT BEDTIME WILIAM


   Stop: 21 21:01


Ferrous Sulfate (Ferrous Sulfate)  325 mg PO DAILY WILIAM


Ibuprofen (Motrin)  200 mg PO Q6H WILIAM


Levothyroxine Sodium (Synthroid)  50 mcg PO DAILY WILIAM


Montelukast Sodium (Singulair)  10 mg PO BEDTIME WILIAM


Morphine Sulfate (Morphine)  15 mg PO Q4H PRN


   PRN Reason: Pain


Non-Formulary Medication (Calcium Carbonate/Vitamin D3 [Calcium 600-Vit D3 200 

Tablet])  1 tab PO BID WILIAM


Non-Formulary Medication (Omeprazole Magnesium [Prilosec Otc])  20 mg PO DAILY 

PRN


   PRN Reason: Dyspepsia


Tramadol HCl (Ultram)  50 mg PO Q6H PRN


   PRN Reason: Pain








Assessment/Plan Comment:: 





1. Admit to swingbed for rehab therapies due to left humeral fracture & left hip

fracture.


2. Asthma: DuoNebs qid, Budesonide bid, Montelukast at bedtime.


3. Pain: Tylenol 500 mg qid with Ibuprofen 200 mg qid, Morphine 15 mg q6h as 

needed or Tramadol 50 mg q6h as needed breakthrough pain. Has not had stool yet,

but passing gas. Just started eating well yesterday. Senna bid, MiraLax daily as

needed constipation while on narcotics & iron.


4. Dressing: Aquacel dressing for 7 days then dry dressing to left hip. Ortho 

appt 2021 at 1030am. 


5. DVT prophylaxis: Lovenox 40 mg SQ daily, day 3/28.


6: CODE STATUS: FULL. Care conference Thursday at 1300. 





- Mortality Measure


Prognosis:: Good

## 2021-01-16 NOTE — PCM.SN.2
- Free Text/Narrative


Note: 





S: Melinda noticed painful lesion on right breast, thought it had been from a 

tight bra, red.


O: 2 cm erythematous tender boil on right breast and adjacent 1 cm erythematous 

boil, located at 9:00 on Upper outer quadrant, expressed some yellow pus from 

smaller boil, unable to express anything out of larger. 


A: Furunculosis


P: Bactrim DS 1 tab bid for 10 days. Warm compresses to right breast qid.

## 2021-01-20 NOTE — CT
INDICATION:  Myeloma workup. 



CT CERVICAL SPINE:  Spiral 2.5 mm axial sections were obtained through the 
cervical spine with sagittal and coronal reconstructions 01/20/21 - no 
comparison.  

Total exam DLP was 370.74 mGy-cm.  



Multiple low-density lesions are scattered about the vertebral bodies of the 
cervical spine, involving to one extent or another all vertebral bodies of the 
cervical spine and are compatible with multiple myeloma.  



Vertebral body heights were maintained.  



Decreased disk space is noted at C4-5 and minimally at C5-6 with hypertrophic 
changes more prominent at C4-5.  Narrowing of the C4-5 neural foramen on the 
left is noted.  



Degenerative changes with narrowing and hypertrophic spurring are noted at the 
odontoatlantian joint.  



IMPRESSION: 

1.  Multiple myeloma. 

2.  Degenerative changes and disk disease as noted above in the cervical spine. 


MTDD

## 2021-01-20 NOTE — CT
INDICATION:  Myeloma workup. 



CT RIGHT UPPER EXTREMITY WITHOUT CONTRAST:  Spiral 1.25 mm axial sections were 
obtained through the left upper extremity and revealed no definite lytic lesions
in the humerus or ulna.  There is a suspicious punched-out appearing lesion 
along the dorsum of the distal radial metaphysis at the radiocarpal joint which 
may represent a small myeloma lesion measuring approximately 6.7 mm.  No other 
lesion suspicious for myeloma were identified.  



IMPRESSION:  

Small lesion compatible with myeloma suggested at the distal metaphysis.  



Total exam DLP was 6638.26 mGy-cm (multiple runs of images were utilized to be 
able to obtain diagnostic examination). 

MTDD

## 2021-01-20 NOTE — CT
INDICATION:  Myeloma workup. 



CT LUMBOSACRAL SPINE:  Sagittal and coronal, as well as angled L3-4, L4-5, and 
L5-S1 disk level reconstructions were obtained 01/20/21 with 2.5 mm slices and 
revealed a mild dextroconcave scoliosis at the lower lumbar spine.  



What may be a myeloma lesion is noted with minimal compression anteriorly at the
superior endplate of L3 vertebral body.  Low density abnormality in the lateral 
posterior L4 vertebral body is also compatible with a myeloma lesion with 
multiple smaller lesions suggested at the L5 vertebral body.  One more 
anteriorly is more prominent at the L5 vertebral body.  Multiple additional 
lesions are noted at the L2 vertebral body.  



At the left L5 pedicle, there is a very large lesion with large soft tissue mass
present which measures approximately 29 mm craniocaudad which does not appear to
extend into the spinal canal to any degree, but may be impressing upon exiting 
nerve root.  Additionally at L4-5 there is moderate degree of spinal stenosis 
due to bulging disk and prominent ligamenta flava.  

No other spinal stenosis was seen.  



IMPRESSION: 

1.  Multiple myeloma lesions scattered about the lumbosacral spine with the 
largest most prominent abnormality at the left L5 pedicle which includes a soft 
tissue mass measuring approximately 29 mm likely impressing upon nerve roots.  

2.  Degenerative changes and disk disease with vacuum disk phenomena L2-3, L3-4,
L4-5 - hypertrophic spurring relatively mild.  

MTDD

## 2021-01-20 NOTE — CT
INDICATION:  Myeloma workup. 



CT BILATERAL LOWER EXTREMITIES WITHOUT CONTRAST:  Spiral 2.5 mm axial sections 
were obtained through the pelvis and lower extremities bilaterally with sagittal
and coronal reconstructions 01/20/21 - no comparison.  



In the anterior vertebral body of L5 and perhaps minimally at L5 posteriorly 
vertebral body there were low-density abnormalities which could represent 
myeloma lesions.  Multiple similar lesions are noted in the L4 vertebral body 
and the L3 vertebral body.  Multiple areas of low-density abnormality are noted 
in the iliac bones with somewhat mottled appearance, some of these are 
suspicious for myeloma lesions, but are not as clear cut.  



Low-density lesion at the inferior medial aspect of the right acetabulum would 
be compatible with a myeloma lesion with an additional smaller lesion more 
posteriorly also on the right.  



A hip pinning device is noted at the proximal femur on the left producing hard 
beam artifact and limiting detail in that area.  No gross lytic lesion is noted 
in that portion of the visualized left femur.  No definite lytic lesions are 
noted in the left lower extremity in general.  



Medial hemiarthroplasty is noted at the right knee with hard beam artifact in 
that area limiting detail somewhat.  

No definite focal lytic lesion is noted in the right lower extremity.  



No additional suspicious lesions were identified on these images.  



Total exam DLP was 1121.73 mGy-cm. 

MTDD

## 2021-01-20 NOTE — CT
INDICATION:  Myeloma workup.  



CT OF THE THORACIC SPINE:  2.5 mm sections were obtained through the thoracic 
spine 01/20/21 and revealed low-density lesions compatible with myeloma at T3, 
minimally at T4, minimally at T5, T6, T7 and more prominently at T8  



Overall vertebral body heights were fairly well maintained.  



Hypertrophic degenerative changes of vertebral bodies are noted anteriorly for 
the most part in the midthoracic spine extending into the lower thoracic spine 
with degenerative disk disease at mid to lower thoracic spine levels with vacuum
disk phenomena at T9-10.  

No acute fracture or dislocation was seen.  



IMPRESSION:  

1.  Multiple myeloma lesions are suggested.  

2.  Hypertrophic degenerative changes and disk disease mid to lower thoracic 
spine.  

MTDD

## 2021-01-20 NOTE — CT
INDICATION:  Myeloma workup.  



CT HEAD WITHOUT CONTRAST:  Spiral 3.75 mm axial sections were obtained through 
the brain without contrast with sagittal, coronal and axial reconstructions 
01/20/21 - no comparisons.  

Total exam DLP was 1065.61 mGy-cm.  



Retention cysts are noted in the left maxillary antrum.  The paranasal sinuses 
and mastoid air cells were well aerated otherwise.  



Multiple punched out lesions are noted scattered about the calvarium compatible 
with multiple myeloma, they vary in size with the largest in the posterior 
parietal area on the right near the convexity measuring 19 mm.  In a similar 
location on the left, a lytic lesion measured 12 mm.  



Calcifications are noted in the internal carotid arteries .  



The orbits appear to be intact.  



No shift of midline structures was identified.  



No bleeding site or hematoma was seen.  



There was suggestion of some minimal areas of low-density in the white matter 
suggesting minimal microvascular disease.  



IMPRESSION: 

1.  Multiple myeloma lesions, numerous in the calvarium, largest measuring 19 
mm.  

2.  Cerebrovascular disease with suggestion of minimal microvascular disease 
type changes in the white matter.  



MTDD

## 2021-01-20 NOTE — PCM.SN.2
- Free Text/Narrative


Note: 





Had abnormal SPEP/UPEP, oncology called requested CT whole body scan, was 

positive for Multiple myeloma in scalp, cervical spine(extensive), thoracic 

spine, L2/L4 lumbar spine, right acetabulum, left hip & humerus, right radius. 

Spoke with patient about results. Will notify her PCP of results. Anticipate she

will need outpatient oncology appointment to get started on chemotherapy as soon

as possible. High risk for further fractures if not treated. Requested for 

Father Jp to come in to see patient.

## 2021-01-20 NOTE — CT
INDICATION:  Myeloma workup.  



CT CHEST, ABDOMEN AND PELVIS WITHOUT CONTRAST:  Spiral 3.75 mm axial sections 
were obtained through the chest, abdomen and pelvis with sagittal and coronal 
reconstructions 01/20/21 - no comparison.  

Total exam DLP was 2663.71 mGy-cm.  



The thoracic and lumbar spine, as well as the pelvis was commented on earlier.  



No definite mediastinal mass was seen.  



The heart appears prominent in size and may be slightly enlarged.  

No pericardial effusion was seen. 

Relatively minimal mediastinal lymphadenopathy is noted which is nonspecific.  
An active infiltrate or nodular mass was not identified.  



No definite myeloma lesions were noted in the ribs.  



IMPRESSION: 

1.  Multiple myeloma as noted on earlier reports. 

2.  ASHD. 

3.  DJD and disk disease thoracic spine.  





CT ABDOMEN AND PELVIS:  Examination of the abdomen and pelvis was obtained by CT
as noted above with sagittal and coronal reconstructions and revealed myeloma 
lesions in bony structures as noted on previous reports.  



Minimal aortic calcification is noted with no dilatation.  



There is a low density lesion in the midpole of the left kidney, etiology 
indeterminate, especially without IV contrast.  It is not well defined and 
measured approximately 22 mm on axial image 29 on series 3.  

There are some minor cortical irregularities of the kidneys compatible with a 
minimal degree of renal cortical scarring.  

Adrenal glands appear to be unremarkable.  

The liver, gallbladder, spleen, and pancreas appear to be normal.  Common bile 
duct did not appear to be enlarged.  



No definite retroperitoneal mass was seen, although retroperitoneal 
lymphadenopathy is present, it is mild and nonspecific.  



The appendix is absent compatible with history of its removal.  



No evidence of free air or bowel obstruction was seen.  



Ascending colon diverticulosis was present without evidence of diverticulitis.  
Descending colon diverticulosis and more prominently sigmoid diverticulosis is 
present without definite evidence of diverticulitis.  



Uterus was absent compatible with history of its removal.  



Urinary bladder was unremarkable.  



No intraperitoneal or retroperitoneal mass, organomegaly, or free fluid 
collection was identified.  



Metallic densities are noted in the stomach which may represent medication - 
correlate clinically.  



IMPRESSION: 

1.  Multiple myeloma lesions scattered about the axial skeleton as noted 
previously.  

2.  Sigmoid diverticulosis without evidence of diverticulitis.  

3.  Post appendectomy. 

4.  Post hysterectomy.  

5.  Degenerative changes and disk disease and low lumbar scoliosis.  

MTDD

## 2021-01-20 NOTE — CT
INDICATION:  Myeloma workup.  



CT OF THE LEFT UPPER EXTREMITY WITHOUT CONTRAST:  Spiral 1.25 mm axial sections 
were obtained through the left upper extremity with sagittal and coronal 
reconstructions 01/20/21 and compared with recent x-rays.  

Total exam DLP was 1065.61 mGy-cm.



Comminuted pathologic is noted through the surgical neck of the humerus with 
deformity and an obvious low-density lesion compatible with multiple myeloma 
lesion.  The humeral head has a somewhat porotic appearance additionally.  No 
additional definite myeloma lesion is noted in the left humerus.  No definite 
lytic lesions were noted in the radius or ulna.  



IMPRESSION:  Comminuted pathologic fracture at the proximal humerus compatible 
with multiple myeloma pathologic fracture site.  

MTDD

## 2021-01-21 NOTE — PCM.PN
- General Info


Date of Service: 01/21/21


Subjective Update: 





Melinda had whole body CT yesterday for neoplasm workup due to pathologic 

fracture, found multiple myeloma in skull, cervical spine, thoracic spine, 

lumbar spine, right radius, left humerus, left hip, right acetabulum. Notified 

her PCP Dr Hayes's office to try to get appointment as soon as possible with 

oncology. Try to organize with orthopedics appointment. Muscle spasms are better

with Diazepam and Ice. She is sleeping better. Right breast is not draining as 

much, non tender. Prune juice upsets her stomach but was better this morning. 

Advised she has Pantoprazole rather than omeprazole here. Pain controlled with 

low dose Tylenol/Ibuprofen 500/200 mg q6h and Tramadol. Has not used Morphine so

discontinued. 





- Patient Data


Vitals - Most Recent: 


                                Last Vital Signs











Temp  97.9 F   01/21/21 06:37


 


Pulse  82   01/21/21 06:37


 


Resp  16   01/21/21 06:37


 


BP  108/52 L  01/21/21 06:37


 


Pulse Ox  96   01/21/21 06:37











Weight - Most Recent: 170 lb 6 oz


Med Orders - Current: 


                               Current Medications





Acetaminophen (Tylenol Extra Strength)  500 mg PO Q6H [facility]


   Last Admin: 01/21/21 08:11 Dose:  500 mg


   Documented by: 


Albuterol/Ipratropium (Duoneb 3.0-0.5 Mg/3 Ml)  3 ml INH 0700,1100,1500,1900 [facility]


   Last Admin: 01/21/21 10:55 Dose:  3 ml


   Documented by: 


Budesonide (Pulmicort)  0.5 mg INH BID@0700,1900 [facility]


   Last Admin: 01/21/21 06:31 Dose:  0.5 mg


   Documented by: 


Calcium Carbonate/Glycine (Oyster Shell Calcium)  500 mg PO BID [facility]


   Last Admin: 01/21/21 08:13 Dose:  500 mg


   Documented by: 


Diazepam (Valium)  2 mg PO TID PRN


   PRN Reason: Muscle Spasm


   Last Admin: 01/20/21 20:29 Dose:  2 mg


   Documented by: 


Enoxaparin Sodium (Lovenox)  40 mg SUBCUT BEDTIME [facility]


   Stop: 02/05/21 21:01


   Last Admin: 01/20/21 20:33 Dose:  40 mg


   Documented by: 


Ferrous Sulfate (Ferrous Sulfate)  325 mg PO DAILY [facility]


   Last Admin: 01/21/21 08:11 Dose:  325 mg


   Documented by: 


Ibuprofen (Motrin)  200 mg PO Q6H [facility]


   Last Admin: 01/21/21 08:12 Dose:  200 mg


   Documented by: 


Levothyroxine Sodium (Synthroid)  50 mcg PO DAILY [facility]


   Last Admin: 01/21/21 08:13 Dose:  50 mcg


   Documented by: 


Melatonin (Melatonin)  6 mg PO BEDTIME PRN


   PRN Reason: Insomnia


   Last Admin: 01/20/21 20:29 Dose:  6 mg


   Documented by: 


Montelukast Sodium (Singulair)  10 mg PO BEDTIME [facility]


   Last Admin: 01/20/21 20:28 Dose:  10 mg


   Documented by: 


Pantoprazole Sodium (Protonix***)  40 mg PO DAILY PRN


   PRN Reason: Dyspepsia


Polyethylene Glycol (Miralax)  17 gm PO DAILY PRN


   PRN Reason: CONSTIPATION


Saccharomyces Boulardii (Florastor)  250 mg PO BID [facility]


   Stop: 01/26/21 21:01


   Last Admin: 01/21/21 08:13 Dose:  250 mg


   Documented by: 


Senna/Docusate Sodium (Senna Plus)  1 tab PO BID [facility]


   Last Admin: 01/21/21 08:12 Dose:  1 tab


   Documented by: 


Tramadol HCl (Ultram)  50 mg PO Q6H PRN


   PRN Reason: Pain (4-6/10)


   Last Admin: 01/21/21 08:11 Dose:  50 mg


   Documented by: 


Trimethoprim/Sulfamethoxazole (Septra Ds)  1 tab PO BID [facility]


   Stop: 01/26/21 09:01


   Last Admin: 01/21/21 08:12 Dose:  1 tab


   Documented by: 





Discontinued Medications





Albuterol/Ipratropium (Duoneb 3.0-0.5 Mg/3 Ml)  3 ml INH QID [facility]


   Last Admin: 01/13/21 12:42 Dose:  3 ml


   Documented by: 


Budesonide (Pulmicort)  0.5 mg INH BID [facility]


   Last Admin: 01/13/21 08:56 Dose:  0.5 mg


   Documented by: 


Diazepam (Valium)  2 mg PO BEDTIME PRN


   PRN Reason: Muscle Spasm


   Last Admin: 01/18/21 21:24 Dose:  2 mg


   Documented by: 


Morphine Sulfate (Morphine)  15 mg PO Q4H PRN


   PRN Reason: Pain (7-10/10)











- Exam


General: Alert, Oriented, Cooperative, No Acute Distress


Lungs: Clear to Auscultation, Normal Respiratory Effort.  No: Wheezing


Cardiovascular: Regular Rate, Regular Rhythm


GI/Abdominal Exam: Normal Bowel Sounds, Soft, Non-Tender, No Distention


Extremities: No Pedal Edema, Other (Left arm in envelope sling)


Peripheral Pulses: 2+: Radial (L), Radial (R)


Wound/Incisions: Healing Well, Drainage (minimal drainage on band-aid, right 

breast), Erythema Improving (Right upper outer breast: slough tissue present, 

good granulation, NT)





Sepsis Event Note





- Evaluation


Sepsis Screening Result: No Definite Risk





- Focused Exam


Vital Signs: 


                                   Vital Signs











  Temp Pulse Resp BP Pulse Ox


 


 01/21/21 06:37  97.9 F  82  16  108/52 L  96


 


 01/21/21 06:31   84   














- Problem List & Annotations


(1) Intertrochanteric fracture, hip


SNOMED Code(s): 063864163


   Code(s): S72.143A - DISPLACED INTERTROCHANTERIC FRACTURE OF UNSP FEMUR, INIT 

 Status: Acute   Current Visit: No   Onset Date: ~01/08/21   





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


SNOMED Code(s): 434773407


   Code(s): M84.50XA - PATHOLOGICAL FRACTURE IN NEOPLASTIC DISEASE, UNSP SITE, 

INIT   Status: Acute   Current Visit: Yes   


Qualifiers: 


   Site of pathological fracture: hip 





(3) Multiple myeloma


SNOMED Code(s): 310653147


   Code(s): C90.00 - MULTIPLE MYELOMA NOT HAVING ACHIEVED REMISSION   Status: 

Acute   Current Visit: Yes   Onset Date: ~01/20/21   





(4) Closed fracture of left proximal humerus


SNOMED Code(s): 10062404


   Code(s): S42.202A - UNSP FRACTURE OF UPPER END OF LEFT HUMERUS, INIT FOR CLOS

FX   Status: Acute   Current Visit: No   Onset Date: ~01/08/21   





(5) Asthma, moderate persistent


SNOMED Code(s): 833541821


   Code(s): J45.40 - MODERATE PERSISTENT ASTHMA, UNCOMPLICATED   Status: Chronic

  Current Visit: Yes   





(6) Sciatica


SNOMED Code(s): 63590922


   Code(s): M54.30 - SCIATICA, UNSPECIFIED SIDE   Status: Chronic   Current 

Visit: No   


Qualifiers: 


 





(7) Esophageal reflux


SNOMED Code(s): 317421498


   Code(s): K21.9 - GASTRO-ESOPHAGEAL REFLUX DISEASE WITHOUT ESOPHAGITIS   

Status: Chronic   Current Visit: Yes   





(8) Obesity (BMI 35.0-39.9 without comorbidity)


SNOMED Code(s): 464159822, 911559121


   Code(s): E66.9 - OBESITY, UNSPECIFIED   Status: Chronic   Current Visit: Yes 

 





(9) Hypothyroidism


SNOMED Code(s): 24276051


   Code(s): E03.9 - HYPOTHYROIDISM, UNSPECIFIED   Status: Chronic   Current 

Visit: Yes   





(10) Furuncle of breast


SNOMED Code(s): 76691232


   Code(s): N61.1 - ABSCESS OF THE BREAST AND NIPPLE   Status: Acute   Current 

Visit: Yes   





- Problem List Review


Problem List Initiated/Reviewed/Updated: Yes





- My Orders


Last 24 Hours: 


My Active Orders





01/25/21 06:00


BASIC METABOLIC PANEL,BMP [CHEM] Routine 














- Plan


Plan:: 





1. Left humeral/hip fracture secondary to Multiple myeloma: PT/OT. Dr Hayes's 

office setting up oncology appt to start chemotherapy.


2. Asthma: DuoNebs qid, Budesonide bid, Montelukast at bedtime.


3. Pain: Tylenol 500 mg qid with Ibuprofen 200 mg qid, Tramadol 50 mg q6h as 

needed breakthrough pain. Senna bid, MiraLax daily as needed constipation while 

on narcotics & iron.


4. Dressing: Allevyn dressing to left hip. Ortho appt 1/26/2021 at 1030am. 


5. DVT prophylaxis: Lovenox 40 mg SQ daily, day 9/28.


6. Right breast furuncle improving. Bactrim for 10 days, day 5, 10/20 doses 

given.


7. Care conference Thursday at 1300.

## 2021-02-03 NOTE — PCM.PN
- General Info


Date of Service: 02/03/21


Admission Dx/Problem (Free Text): 


                           Admission Diagnosis/Problem





Admission Diagnosis/Problem      Rehabilitation therapy








Subjective Update: 


Patient has been going out for radiation therapy over the last several days.  

This morning she reports that she is very tired and fatigued but her pain is 

well controlled.  She states that she is not having any difficulty with diet, 

urination, stool and she is ambulating well with the aid of a cane and physical 

therapy.  Looking forward to going home to her new place with no stairs.





Functional Status: Reports: Pain Controlled, Tolerating Diet, Ambulating, 

Urinating





- Review of Systems


General: Reports: Weakness, Fatigue


HEENT: Reports: No Symptoms


Pulmonary: Reports: No Symptoms


Cardiovascular: Reports: No Symptoms


Gastrointestinal: Reports: No Symptoms


Genitourinary: Reports: No Symptoms


Musculoskeletal: Reports: Arm Pain, Leg Pain (Skin lesion of the right breast)


Neurological: Reports: No Symptoms


Psychiatric: Reports: No Symptoms





- Patient Data


Vitals - Most Recent: 


                                Last Vital Signs











Temp  36.5 C   02/03/21 05:03


 


Pulse  86   02/03/21 06:09


 


Resp  18   02/03/21 05:03


 


BP  104/52 L  02/03/21 05:03


 


Pulse Ox  97   02/03/21 05:03











Weight - Most Recent: 77.281 kg


Lab Results Last 24 Hours: 


                         Laboratory Results - last 24 hr











  02/02/21 02/02/21 02/02/21 Range/Units





  06:35 06:35 06:35 


 


Retic Count    1.6  (0.6-2.6)  %


 


Iron     ()  ug/dL


 


TIBC     (250-450)  ug/dL


 


Iron Saturation     (15-55)  %


 


Unsaturated IBC     (118-369)  ug/dL


 


Ferritin  482 H    ()  ng/mL


 


Vitamin B12     (193-986)  pg/mL


 


Folate   7.3   (>3.0)  ng/mL














  02/02/21 02/02/21 Range/Units





  06:35 06:35 


 


Retic Count    (0.6-2.6)  %


 


Iron  66   ()  ug/dL


 


TIBC  271   (250-450)  ug/dL


 


Iron Saturation  24   (15-55)  %


 


Unsaturated IBC  205   (118-369)  ug/dL


 


Ferritin    ()  ng/mL


 


Vitamin B12   395  (193-986)  pg/mL


 


Folate    (>3.0)  ng/mL











Med Orders - Current: 


                               Current Medications





Acetaminophen (Tylenol Extra Strength)  500 mg PO Q6H [facility]


   Last Admin: 02/03/21 07:05 Dose:  Not Given


   Documented by: 


Albuterol/Ipratropium (Duoneb 3.0-0.5 Mg/3 Ml)  3 ml INH 0700,1100,1500,1900 [facility]


   Last Admin: 02/03/21 10:46 Dose:  3 ml


   Documented by: 


Aspirin (Halfprin)  81 mg PO DAILY [facility]


   Last Admin: 02/03/21 10:47 Dose:  81 mg


   Documented by: 


Budesonide (Pulmicort)  0.5 mg INH BID@0700,1900 [facility]


   Last Admin: 02/03/21 06:09 Dose:  0.5 mg


   Documented by: 


Calcium Carbonate/Glycine (Oyster Shell Calcium)  500 mg PO BID [facility]


   Last Admin: 02/03/21 10:49 Dose:  500 mg


   Documented by: 


Diazepam (Valium)  2 mg PO TID PRN


   PRN Reason: Muscle Spasm


   Last Admin: 02/02/21 20:35 Dose:  2 mg


   Documented by: 


Enoxaparin Sodium (Lovenox)  40 mg SUBCUT BEDTIME [facility]


   Stop: 02/05/21 21:01


   Last Admin: 02/02/21 20:33 Dose:  40 mg


   Documented by: 


Ferrous Sulfate (Ferrous Sulfate)  325 mg PO DAILY [facility]


   Last Admin: 02/03/21 10:49 Dose:  Not Given


   Documented by: 


Ibuprofen (Motrin)  200 mg PO Q6H [facility]


   Last Admin: 02/03/21 07:04 Dose:  Not Given


   Documented by: 


Levothyroxine Sodium (Synthroid)  50 mcg PO DAILY [facility]


   Last Admin: 02/03/21 11:09 Dose:  Not Given


   Documented by: 


Melatonin (Melatonin)  6 mg PO BEDTIME PRN


   PRN Reason: Insomnia


   Last Admin: 02/02/21 20:34 Dose:  6 mg


   Documented by: 


Montelukast Sodium (Singulair)  10 mg PO BEDTIME [facility]


   Last Admin: 02/02/21 20:33 Dose:  10 mg


   Documented by: 


Mupirocin (Bactroban Oint)  1 gm TOP BID [facility]


   Last Admin: 02/03/21 10:51 Dose:  1 applic


   Documented by: 


Ondansetron HCl (Zofran Odt)  4 mg PO Q6H PRN


   PRN Reason: Nausea/Vomiting


   Last Admin: 01/30/21 17:47 Dose:  4 mg


   Documented by: 


Pantoprazole Sodium (Protonix***)  40 mg PO DAILY@0600 [facility]


   Last Admin: 02/03/21 05:02 Dose:  40 mg


   Documented by: 


Polyethylene Glycol (Miralax)  17 gm PO DAILY PRN


   PRN Reason: CONSTIPATION


Senna/Docusate Sodium (Senna Plus)  1 tab PO BID [facility]


   Last Admin: 02/03/21 10:48 Dose:  1 tab


   Documented by: 


Tramadol HCl (Ultram)  50 mg PO Q6H PRN


   PRN Reason: Pain (4-6/10)


   Last Admin: 02/02/21 12:48 Dose:  50 mg


   Documented by: 


Tramadol HCl (Ultram)  50 mg PO DAILY@0800 [facility]


   Last Admin: 02/03/21 07:03 Dose:  Not Given


   Documented by: 


Trimethoprim/Sulfamethoxazole (Septra Ds)  1 tab PO MoWeFr@0900 [facility]


   Last Admin: 02/03/21 11:09 Dose:  1 tab


   Documented by: 


Valacyclovir HCl (Valtrex)  500 mg PO DAILY [facility]


   Last Admin: 02/03/21 10:48 Dose:  500 mg


   Documented by: 





Discontinued Medications





Albuterol/Ipratropium (Duoneb 3.0-0.5 Mg/3 Ml)  3 ml INH QID [facility]


   Last Admin: 01/13/21 12:42 Dose:  3 ml


   Documented by: 


Budesonide (Pulmicort)  0.5 mg INH BID [facility]


   Last Admin: 01/13/21 08:56 Dose:  0.5 mg


   Documented by: 


Diazepam (Valium)  2 mg PO BEDTIME PRN


   PRN Reason: Muscle Spasm


   Last Admin: 01/18/21 21:24 Dose:  2 mg


   Documented by: 


Magnesium Citrate (Citrate Of Magnesia)  296 ml PO ONETIME ONE


   Stop: 01/30/21 08:18


   Last Admin: 01/30/21 08:42 Dose:  296 ml


   Documented by: 


Morphine Sulfate (Morphine)  15 mg PO Q4H PRN


   PRN Reason: Pain (7-10/10)


Pantoprazole Sodium (Protonix***)  40 mg PO DAILY PRN


   PRN Reason: Dyspepsia


   Last Admin: 01/26/21 04:23 Dose:  40 mg


   Documented by: 


Saccharomyces Boulardii (Florastor)  250 mg PO BID [facility]


   Stop: 01/26/21 21:01


   Last Admin: 01/26/21 20:10 Dose:  250 mg


   Documented by: 


Trimethoprim/Sulfamethoxazole (Septra Ds)  1 tab PO BID [facility]


   Stop: 01/26/21 09:01


   Last Admin: 01/26/21 08:37 Dose:  1 tab


   Documented by: 











- Exam


Quality Assessment: Supplemental Oxygen, DVT Prophylaxis, Skin Breakdown


General: Alert, Oriented, Cooperative, No Acute Distress


Lungs: Clear to Auscultation, Normal Respiratory Effort


Cardiovascular: Regular Rate, Regular Rhythm, Murmurs (Systolic ejection murmur)


GI/Abdominal Exam: Normal Bowel Sounds, Soft, Non-Tender


Extremities: Normal Inspection


Skin: Other (Patient has 2 small wounds on the lateral aspect of her right 

breast, healing well, no fluctuance, there is still a small exposed area on the 

inferior wound)


Wound/Incisions: Healing Well, No Drainage, Erythema Improving


Neurological: No: Normal Gait, Strength Equal Bilateral


Psy/Mental Status: Alert, Normal Affect, Normal Mood





Sepsis Event Note





- Evaluation


Sepsis Screening Result: No Definite Risk





- Focused Exam


Vital Signs: 


                                   Vital Signs











  Temp Pulse Resp BP Pulse Ox


 


 02/03/21 06:09   86   


 


 02/03/21 05:03  36.5 C  83  18  104/52 L  97














- Problem List & Annotations


(1) Anemia of chronic disease


SNOMED Code(s): 166705861


   Code(s): D63.8 - ANEMIA IN OTHER CHRONIC DISEASES CLASSIFIED ELSEWHERE   

Status: Acute   Current Visit: Yes   





(2) Furuncle of breast


SNOMED Code(s): 34447591


   Code(s): N61.1 - ABSCESS OF THE BREAST AND NIPPLE   Status: Acute   Current 

Visit: Yes   





(3) Multiple myeloma


SNOMED Code(s): 821305147


   Code(s): C90.00 - MULTIPLE MYELOMA NOT HAVING ACHIEVED REMISSION   Status: 

Acute   Current Visit: Yes   Onset Date: ~01/20/21   





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


SNOMED Code(s): 218313473


   Code(s): M84.50XA - PATHOLOGICAL FRACTURE IN NEOPLASTIC DISEASE, UNSP SITE, 

INIT   Status: Acute   Current Visit: Yes   


Qualifiers: 


   Site of pathological fracture: hip 





(5) Asthma, moderate persistent


SNOMED Code(s): 118883764


   Code(s): J45.40 - MODERATE PERSISTENT ASTHMA, UNCOMPLICATED   Status: Chronic

  Current Visit: Yes   





(6) Esophageal reflux


SNOMED Code(s): 032610911


   Code(s): K21.9 - GASTRO-ESOPHAGEAL REFLUX DISEASE WITHOUT ESOPHAGITIS   

Status: Chronic   Current Visit: Yes   





(7) Hypothyroidism


SNOMED Code(s): 26543055


   Code(s): E03.9 - HYPOTHYROIDISM, UNSPECIFIED   Status: Chronic   Current 

Visit: Yes   





(8) Obesity (BMI 35.0-39.9 without comorbidity)


SNOMED Code(s): 939705895, 091093168


   Code(s): E66.9 - OBESITY, UNSPECIFIED   Status: Chronic   Current Visit: Yes 

 





(9) Closed fracture of left proximal humerus


SNOMED Code(s): 58023204


   Code(s): S42.202A - UNSP FRACTURE OF UPPER END OF LEFT HUMERUS, INIT FOR CLOS

FX   Status: Acute   Current Visit: No   Onset Date: ~01/08/21   





(10) Intertrochanteric fracture, hip


SNOMED Code(s): 335228480


   Code(s): S72.143A - DISPLACED INTERTROCHANTERIC FRACTURE OF UNSP FEMUR, INIT 

 Status: Acute   Current Visit: No   Onset Date: ~01/08/21   





(11) Sciatica


SNOMED Code(s): 01991622


   Code(s): M54.30 - SCIATICA, UNSPECIFIED SIDE   Status: Chronic   Current 

Visit: No   


Qualifiers: 


 





- Problem List Review


Problem List Initiated/Reviewed/Updated: Yes





- Plan


Plan:: 


Patient is progressing well with physical therapy and stated that she was able 

to walk 31 feet using her cane yesterday.  Letter, she continues to have pain in

her left arm/shoulder and left lower extremity.  Is scheduled to transfer to her

new home no later than Monday, February 8, 2021 and systolic chemotherapy on 

Tuesday, February 9, 2021.  Oncology has started the patient on prophylactic 

antibiotics and antiviral medication.  Continue medication treatment as above 

and occupational and physical therapy.

## 2021-02-06 NOTE — PN
DATE SEEN:  02/06/2021

 

HISTORY:  Melinda is a 75-year-old woman with a history of chronic asthma who

sustained a fall back in November of 2020.  She injured her left hip and left

shoulder at the time, but continued to walk and use her arm, and never seemed to

get better however, and then on January 8, she had felt a sudden pop in her

side, fell, and came in to [facility].  They found her to have both left hip

fracture and left humerus fracture.  She was sent to [place] where she had ORIF

and was found to have a bone marrow biopsy consistent with multiple myeloma.

She is just completing her 5th and final radiation treatment this week now and

she is going to start chemotherapy.  She is currently getting physical therapy

at [facility] now.  She is allowed to be weightbearing as tolerated on the left

hip, on the left lower extremity, but motion at the left shoulder is still

restricted.

 

PHYSICAL EXAMINATION:  GENERAL:  She is alert and comfortable.  VITAL SIGNS:

Blood pressure 109/52, pulse 85 and regular, respirations 16, O2 saturation 100%

on room air, temperature 97.6, weight 170 pounds, down 6 pounds from admission.

SKIN:  Shows no sign of rash or trauma.  RESPIRATIONS:  Easy.  HEART:  Regular.

EXTREMITIES:  Lower extremity shows minimal edema at the left shin, but

comfortable internal and external rotation to 10 degrees in each direction on

the left hip.  She also has comfortable internal and external rotation at 10

degrees on each direction of the left humerus.

 

ASSESSMENT:

1. Multiple myeloma with left hip and left humerus pathologic fractures.

2. Asthma.

3. Myelophthisic anemia.

4. Hyperuricemia at 6.5.

 

PLAN:  We will begin allopurinol.  Continue her other medications and physical

therapy.  Arrangements are being made for her to move to a level spacious

apartment for her future plans of going home.  Followup.  We will continue to

provide palliative care measures while she is here.

 

Job#: [id number]

DD: 02/06/2021 1020

DT: 02/06/2021 1056 RO/VLADIMIRL

## 2021-02-12 NOTE — PCM.PN
- General Info


Date of Service: 02/12/21


Subjective Update: 





Melinda finished her radiation treatment for her multiple myeloma, started chemo 

got dose in [facility], then started chemo here on Tuesday, had some diarrhea then 

but not >4 stools/24 hours that day. Today she had first diarrhea stool at 8, 

then had 2 more within a few hours, the last one was continuous. Nurse rechecked

her blood pressures after lunch so she could do therapy this afternoon, she 

complained this afternoon of lightheadedness, fatigue, SBP was 85. Moved her 

into bed, called and left message with Arlene with medical oncology. 





- Review of Systems


General: Denies: Fever


Pulmonary: Denies: Shortness of Breath, Cough


Cardiovascular: Denies: Chest Pain


Gastrointestinal: Reports: Diarrhea.  Denies: Abdominal Pain


Musculoskeletal: Reports: Neck Pain, Back Pain





- Patient Data


Vitals - Most Recent: 


                                Last Vital Signs











Temp  98.0 F   02/12/21 07:57


 


Pulse  77   02/12/21 12:30


 


Resp  18   02/12/21 07:57


 


BP  80/36 L  02/12/21 12:30


 


Pulse Ox  96   02/12/21 07:57











Weight - Most Recent: 170 lb 8 oz


I&O - Last 24 Hours: 


                                 Intake & Output











 02/11/21 02/12/21 02/12/21





 22:59 06:59 14:59


 


Intake Total   120


 


Balance   120











Med Orders - Current: 


                               Current Medications





Acetaminophen (Tylenol Extra Strength)  500 mg PO Q6H [facility]


   Last Admin: 02/12/21 08:08 Dose:  500 mg


   Documented by: 


Albuterol/Ipratropium (Duoneb 3.0-0.5 Mg/3 Ml)  3 ml INH 0700,1100,1500,1900 [facility]


   Last Admin: 02/12/21 11:02 Dose:  3 ml


   Documented by: 


Allopurinol (Zyloprim)  100 mg PO DAILY [facility]


   Last Admin: 02/12/21 08:09 Dose:  100 mg


   Documented by: 


Aspirin (Halfprin)  81 mg PO DAILY [facility]


   Last Admin: 02/12/21 08:08 Dose:  81 mg


   Documented by: 


Budesonide (Pulmicort)  0.5 mg INH BID@0700,1900 [facility]


   Last Admin: 02/12/21 06:19 Dose:  0.5 mg


   Documented by: 


Calcium Carbonate/Glycine (Oyster Shell Calcium)  500 mg PO BID [facility]


   Last Admin: 02/12/21 08:09 Dose:  500 mg


   Documented by: 


Dexamethasone (Dexamethasone)  40 mg PO Q7D [facility]


   Last Admin: 02/09/21 14:19 Dose:  40 mg


   Documented by: 


Diazepam (Valium)  2 mg PO TID PRN


   PRN Reason: Muscle Spasm


   Last Admin: 02/12/21 09:14 Dose:  2 mg


   Documented by: 


Enoxaparin Sodium (Lovenox)  40 mg SUBCUT BEDTIME [facility]


   Last Admin: 02/11/21 20:51 Dose:  40 mg


   Documented by: 


Ferrous Sulfate (Ferrous Sulfate)  325 mg PO DAILY [facility]


   Last Admin: 02/12/21 08:08 Dose:  325 mg


   Documented by: 


Sodium Chloride (Normal Saline)  1,000 mls @ 999 mls/hr IV .BOLUS ONE


   Stop: 02/12/21 13:38


Ibuprofen (Motrin)  200 mg PO Q6H [facility]


   Last Admin: 02/12/21 08:08 Dose:  200 mg


   Documented by: 


Levothyroxine Sodium (Synthroid)  50 mcg PO DAILY [facility]


   Last Admin: 02/12/21 08:09 Dose:  50 mcg


   Documented by: 


Melatonin (Melatonin)  6 mg PO BEDTIME PRN


   PRN Reason: Insomnia


   Last Admin: 02/11/21 20:51 Dose:  6 mg


   Documented by: 


Montelukast Sodium (Singulair)  10 mg PO BEDTIME [facility]


   Last Admin: 02/11/21 20:51 Dose:  10 mg


   Documented by: 


Revlimid 25mg (Capsule *Ptom)  0 each PO DAILY [facility]


   Stop: 02/22/21 09:01


   Last Admin: 02/12/21 08:12 Dose:  1 each


   Documented by: 


Ondansetron HCl (Zofran Odt)  4 mg PO Q6H PRN


   PRN Reason: Nausea/Vomiting


   Last Admin: 02/11/21 12:59 Dose:  4 mg


   Documented by: 


Pantoprazole Sodium (Protonix***)  40 mg PO DAILY@0600 [facility]


   Last Admin: 02/12/21 06:00 Dose:  40 mg


   Documented by: 


Polyethylene Glycol (Miralax)  17 gm PO DAILY PRN


   PRN Reason: CONSTIPATION


Senna/Docusate Sodium (Senna Plus)  1 tab PO BID [facility]


   Last Admin: 02/12/21 08:08 Dose:  1 tab


   Documented by: 


Sodium Chloride (Saline Flush)  10 ml FLUSH ASDIRECTED PRN


   PRN Reason: Keep Vein Open


Tramadol HCl (Ultram)  50 mg PO Q6H PRN


   PRN Reason: Pain (4-6/10)


   Last Admin: 02/11/21 19:18 Dose:  50 mg


   Documented by: 


Tramadol HCl (Ultram)  50 mg PO DAILY@0800 [facility]


   Last Admin: 02/12/21 08:09 Dose:  50 mg


   Documented by: 


Trimethoprim/Sulfamethoxazole (Septra Ds)  1 tab PO MoWeFr@0900 [facility]


   Last Admin: 02/12/21 08:08 Dose:  1 tab


   Documented by: 


Valacyclovir HCl (Valtrex)  500 mg PO DAILY [facility]


   Last Admin: 02/12/21 08:09 Dose:  500 mg


   Documented by: 





Discontinued Medications





Albuterol/Ipratropium (Duoneb 3.0-0.5 Mg/3 Ml)  3 ml INH QID [facility]


   Last Admin: 01/13/21 12:42 Dose:  3 ml


   Documented by: 


Budesonide (Pulmicort)  0.5 mg INH BID [facility]


   Last Admin: 01/13/21 08:56 Dose:  0.5 mg


   Documented by: 


Diazepam (Valium)  2 mg PO BEDTIME PRN


   PRN Reason: Muscle Spasm


   Last Admin: 01/18/21 21:24 Dose:  2 mg


   Documented by: 


Magnesium Citrate (Citrate Of Magnesia)  296 ml PO ONETIME ONE


   Stop: 01/30/21 08:18


   Last Admin: 01/30/21 08:42 Dose:  296 ml


   Documented by: 


Morphine Sulfate (Morphine)  15 mg PO Q4H PRN


   PRN Reason: Pain (7-10/10)


Mupirocin (Bactroban Oint)  1 gm TOP BID [facility]


   Last Admin: 02/11/21 09:00 Dose:  Not Given


   Documented by: 


Pantoprazole Sodium (Protonix***)  40 mg PO DAILY PRN


   PRN Reason: Dyspepsia


   Last Admin: 01/26/21 04:23 Dose:  40 mg


   Documented by: 


Saccharomyces Boulardii (Florastor)  250 mg PO BID [facility]


   Stop: 01/26/21 21:01


   Last Admin: 01/26/21 20:10 Dose:  250 mg


   Documented by: 


Trimethoprim/Sulfamethoxazole (Septra Ds)  1 tab PO BID [facility]


   Stop: 01/26/21 09:01


   Last Admin: 01/26/21 08:37 Dose:  1 tab


   Documented by: 











- Exam


General: Alert, Oriented, Cooperative, No Acute Distress


Lungs: Clear to Auscultation, Normal Respiratory Effort


Cardiovascular: Regular Rate, Regular Rhythm


GI/Abdominal Exam: Soft, Non-Tender, No Distention, Abnormal Bowel Sounds 

(hyperactive RUQ/RLQ, hypoactive LUQ/LLQ)





Sepsis Event Note





- Evaluation


Sepsis Screening Result: No Definite Risk





- Focused Exam


Vital Signs: 


                                   Vital Signs











  Temp Pulse Resp BP Pulse Ox


 


 02/12/21 12:30   77   80/36 L 


 


 02/12/21 07:57  98.0 F  90  18  93/36 L  96


 


 02/12/21 06:29  98.1 F  84  18  95/51 L  94 L


 


 02/12/21 06:19   88   














- Problem List & Annotations


(1) Hypotensive episode


SNOMED Code(s): 83815704


   Code(s): I95.9 - HYPOTENSION, UNSPECIFIED   Status: Acute   Current Visit: 

Yes   





(2) Diarrhea


SNOMED Code(s): 01871599


   Code(s): R19.7 - DIARRHEA, UNSPECIFIED   Status: Acute   Current Visit: Yes  







(3) Intertrochanteric fracture, hip


SNOMED Code(s): 221529502


   Code(s): S72.143A - DISPLACED INTERTROCHANTERIC FRACTURE OF UNSP FEMUR, INIT 

 Status: Acute   Current Visit: No   Onset Date: ~01/08/21   





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


SNOMED Code(s): 713649690


   Code(s): M84.50XA - PATHOLOGICAL FRACTURE IN NEOPLASTIC DISEASE, UNSP SITE, 

INIT   Status: Acute   Current Visit: Yes   


Qualifiers: 


   Site of pathological fracture: hip 





(5) Multiple myeloma


SNOMED Code(s): 639658943


   Code(s): C90.00 - MULTIPLE MYELOMA NOT HAVING ACHIEVED REMISSION   Status: 

Acute   Current Visit: Yes   Onset Date: ~01/20/21   





(6) Closed fracture of left proximal humerus


SNOMED Code(s): 59882586


   Code(s): S42.202A - UNSP FRACTURE OF UPPER END OF LEFT HUMERUS, INIT FOR CLOS

FX   Status: Acute   Current Visit: No   Onset Date: ~01/08/21   





(7) Asthma, moderate persistent


SNOMED Code(s): 397094056


   Code(s): J45.40 - MODERATE PERSISTENT ASTHMA, UNCOMPLICATED   Status: Chronic

  Current Visit: Yes   





(8) Sciatica


SNOMED Code(s): 46303452


   Code(s): M54.30 - SCIATICA, UNSPECIFIED SIDE   Status: Chronic   Current 

Visit: No   


Qualifiers: 


 





(9) Esophageal reflux


SNOMED Code(s): 880926078


   Code(s): K21.9 - GASTRO-ESOPHAGEAL REFLUX DISEASE WITHOUT ESOPHAGITIS   

Status: Chronic   Current Visit: Yes   





(10) Obesity (BMI 35.0-39.9 without comorbidity)


SNOMED Code(s): 423835249, 395362258


   Code(s): E66.9 - OBESITY, UNSPECIFIED   Status: Chronic   Current Visit: Yes 

 





(11) Hypothyroidism


SNOMED Code(s): 73188004


   Code(s): E03.9 - HYPOTHYROIDISM, UNSPECIFIED   Status: Chronic   Current 

Visit: Yes   





(12) Furuncle of breast


SNOMED Code(s): 28712731


   Code(s): N61.1 - ABSCESS OF THE BREAST AND NIPPLE   Status: Resolved   

Current Visit: Yes   





- Problem List Review


Problem List Initiated/Reviewed/Updated: Yes





- My Orders


Last 24 Hours: 


My Active Orders





02/12/21 12:37


Peripheral IV Care [RC] .AS DIRECTED 


Sodium Chloride 0.9% [Saline Flush]   10 ml FLUSH ASDIRECTED PRN 


Peripheral IV Insertion Adult [OM.PC] Routine 





02/12/21 12:38


Sodium Chloride 0.9% [Normal Saline] 1,000 ml IV .BOLUS 














- Plan


Plan:: 





1. Hypotension secondary to diarrhea: start IV, 1L NS bolus, then rate of 100 

ml/hr. 


2. Diarrhea: most likely secondary to chemotherapy, call made to Arlene at 

[facility], awaiting to hear back from them. 


3. Left humeral/hip fracture secondary to multiple myeloma: PT/OT continue when 

patient's blood pressure and diarrhea improved.

## 2021-02-13 NOTE — [id number]
**Discharge Summary





- Hospital Course


HPI Initial Comments: 





Melinda arrived today for swing bed admission for left humeral fracture and Left 

intertrochanteric hip fracture sustained on Jan 8, 2021. Had been having chronic

pain in her hip and shoulder from a fall 11/20, had been doing PT locally when 

on Friday, she stumbled on the steps, did not fall but had worsening of her pain

to point her  couldn't get her into the car. She was brought to [facility] where x-rays found left humeral fracture/left hip fractures, sent up 

to [facility], where it was surgical repaired. Hemoglobin 9.1 yesterday. No 

electrolyte abnormalities report by [facility] Hospitalist. She had her DuoNebs 

given 3 times a day in [place] but she states she takes 4 times a day at home, she

is feeling a little tight right now but not wheezing. She has been passing gas, 

but not had any stools since prior to surgery. States she wasn't able to eat 

much until yesterday. She had an immediate release Morphine 1140 this morning 

prior to leaving [place]. Left hip fracture was felt to be pathologic due to 

neoplastic disease, pathology from bone biopsy is pending, UPEP/SPEP are also 

pending. Heme/Onc saw her this morning and will be following up with them as 

outpatient. 


Diagnosis: Stroke: No





- Discharge Data


Discharge Date: 02/13/21


Discharge Disposition: DC/Tfer to Acute Hospital 02


Condition: Serious





- Referral to Home Health


Primary Care Physician: 


Duane Strand, MD








- Discharge Diagnosis/Problem(s)


(1) Neutropenic fever


SNOMED Code(s): 507983874


   ICD Code: D70.9 - NEUTROPENIA, UNSPECIFIED; R50.81 - FEVER PRESENTING WITH 

CONDITIONS CLASSIFIED ELSEWHERE   Status: Acute   Current Visit: Yes   





(2) Pancytopenia due to chemotherapy


SNOMED Code(s): 4558255, 226090129


   ICD Code: D61.810 - ANTINEOPLASTIC CHEMOTHERAPY INDUCED PANCYTOPENIA   

Status: Acute   Current Visit: Yes   





(3) Hypotensive episode


SNOMED Code(s): 74097689


   ICD Code: I95.9 - HYPOTENSION, UNSPECIFIED   Status: Acute   Current Visit: 

Yes   





(4) Diarrhea


SNOMED Code(s): 02743387


   ICD Code: R19.7 - DIARRHEA, UNSPECIFIED   Status: Acute   Current Visit: Yes 

 





(5) Intertrochanteric fracture, hip


SNOMED Code(s): 771587503


   ICD Code: S72.143A - DISPLACED INTERTROCHANTERIC FRACTURE OF UNSP FEMUR, INIT

  Status: Acute   Current Visit: No   Onset Date: ~01/08/21   





(6) Pathological fracture due to neoplastic disease


SNOMED Code(s): 507059460


   ICD Code: M84.50XA - PATHOLOGICAL FRACTURE IN NEOPLASTIC DISEASE, UNSP SITE, 

INIT   Status: Acute   Current Visit: Yes   


Qualifiers: 


   Site of pathological fracture: hip 





(7) Multiple myeloma


SNOMED Code(s): 000467079


   ICD Code: C90.00 - MULTIPLE MYELOMA NOT HAVING ACHIEVED REMISSION   Status: 

Acute   Current Visit: Yes   Onset Date: ~01/20/21   





(8) Closed fracture of left proximal humerus


SNOMED Code(s): 76365374


   ICD Code: S42.202A - UNSP FRACTURE OF UPPER END OF LEFT HUMERUS, INIT FOR 

CLOS FX   Status: Acute   Current Visit: No   Onset Date: ~01/08/21   





(9) Asthma, moderate persistent


SNOMED Code(s): 354743160


   ICD Code: J45.40 - MODERATE PERSISTENT ASTHMA, UNCOMPLICATED   Status: 

Chronic   Current Visit: Yes   





(10) Sciatica


SNOMED Code(s): 77721480


   ICD Code: M54.30 - SCIATICA, UNSPECIFIED SIDE   Status: Chronic   Current 

Visit: No   


Qualifiers: 


 





(11) Esophageal reflux


SNOMED Code(s): 268986876


   ICD Code: K21.9 - GASTRO-ESOPHAGEAL REFLUX DISEASE WITHOUT ESOPHAGITIS   Stat

us: Chronic   Current Visit: Yes   





(12) Obesity (BMI 35.0-39.9 without comorbidity)


SNOMED Code(s): 773827770, 630345075


   ICD Code: E66.9 - OBESITY, UNSPECIFIED   Status: Chronic   Current Visit: Yes

  





(13) Hypothyroidism


SNOMED Code(s): 30127898


   ICD Code: E03.9 - HYPOTHYROIDISM, UNSPECIFIED   Status: Chronic   Current 

Visit: Yes   





(14) Furuncle of breast


SNOMED Code(s): 01205961


   ICD Code: N61.1 - ABSCESS OF THE BREAST AND NIPPLE   Status: Resolved   

Current Visit: Yes   





- Patient Summary/Data


Consults: 


                                  Consultations





01/12/21 15:34


PT Evaluation and Treatment [CONS] Routine 


   Please Evaluate and Treat.


   PT Reason for Consult: Post op Ortho Surgery


   This query below is only for informational purposes and is not editable.


   Admission Diagnosis/Problem: Rehabilitation therapy





01/12/21 15:35


OT Evaluation and Treatment [CONS] Routine 


   Please Evaluate and Treat.


   OT Reason for Consult: ADL's


   This query below is only for informational purposes and is not editable.


   Admission Diagnosis/Problem: Rehabilitation therapy











Hospital Course: 





Melinda was admitted for rehab services for left humeral fracture, non weight 

bearing, s/p left hip ORIF, weight bearing as tolerated. She was found to have 

multiple myeloma on SPEP/UPEP done at [facility], oncology requested whole 

body CT scan which was done here. It showed multiple myeloma in her cranium, 

cervical, thoracic, lumbar spine, right distal radius, right acetabulum, left 

humerus & left hip. She received 5 rounds of radiation last week. Was started on

 Revlimid, Velcade SQ and dexamethasone 40 mg q7days on Tuesdays, Bactrim DS 

MoWeFr and Valtrex 500 mg daily for prophylaxis. She was hyperuricemic so was 

also started on Allopurinol. She developed diarrhea on Friday with hypotension, 

called was placed to med oncology but did not hear back from them. IV NS bolus 1

 Liter was given and NS at 100 ml/hr but no change in her blood pressure. 

Remained afebrile, was pale, blood pressure still 80/40, fatigued so labs 

ordered: WBC 0.8, , Hgb 7.2, platelets 27. Chemistry was normal except 

for K 3.3, Creatinine 1.3, Albumin 2.8, Total protein 5.0. Chest x-ray pending 

and Urine pending. Blood culture pending. Spoke with Oncology on call at 

[facility]: Recommended treating for neutropenic fever, get stool culture & c. 

diff, start Cefepime & Vancomycin, Neupogen 480 mcg daily, transfuse 1 unit of 

PRBCs, if platelets drop below 20, then will need to transfuse 1 unit of 

platelets as well. She is currently stable so will change her to acute care 

inpatient status, get above treatments started. Spoke with patient and her 

daughter Stephanie who is medical power of , they both would like to stay 

here and try treatments and only be transferred if she requires higher level of 

care. Family also wanted us to check to see if she could get her Velcade 

injections here when she doesn't have doctor appointments as it has been hard on

 her to travel for a 2 minute procedure. Will call Arlene at Med/Onc on Monday 

to get this arranged. 





- Patient Instructions


Diet: Regular Diet as Tolerated


Other/Special Instructions: transfer to acute care status





- Discharge Plan


*PRESCRIPTION DRUG MONITORING PROGRAM REVIEWED*: Not Applicable


*COPY OF PRESCRIPTION DRUG MONITORING REPORT IN PATIENT ALEK: Not Applicable


Prescriptions/Med Rec: 


valACYclovir HCl [Valtrex] 500 mg PO DAILY 30 Days #30 tablet


Home Medications: 


                                    Home Meds





Albuterol/Ipratropium [DuoNeb 3.0-0.5 MG/3 ML] 3 ml INH QID 03/31/17 [History]


Budesonide [Pulmicort] 0.5 mg IH BID 03/31/17 [History]


Levothyroxine Sodium 50 mcg PO DAILY 03/31/17 [History]


Montelukast [Singulair] 10 mg PO BEDTIME 03/31/17 [History]


Omeprazole Magnesium [Prilosec Otc] 20 mg PO DAILY PRN 03/31/17 [History]


Calcium Carbonate/Vitamin D3 [Calcium 600-Vit D3 200 Tablet] 1 tab PO BID 

01/12/21 [History]


Ferrous Sulfate 325 mg PO DAILY 01/12/21 [History]


Acetaminophen [Tylenol Extra Strength] 500 mg PO Q6H  tablet 02/13/21 [Rx]


Aspirin [Halfprin] 81 mg PO DAILY  tab.ec 02/13/21 [Rx]


Ibuprofen [Motrin] 200 mg PO Q6H  tablet 02/13/21 [Rx]


Melatonin 6 mg PO BEDTIME PRN  tablet 02/13/21 [Rx]


Non-Formulary Medication [NF Drug] 0 each PO DAILY  each 02/13/21 [Rx]


Ondansetron [Zofran ODT] 4 mg PO Q6H PRN  tab.dis 02/13/21 [Rx]


Sodium Chloride 0.9% [Saline Flush] 10 ml FLUSH ASDIRECTED PRN  syringe 02/13/21

[Rx]


Sulfamethoxazole/Trimethoprim [Septra DS] 1 tab PO MoWeFr@0900  tablet 02/13/21 

[Rx]


allopurinoL [Zyloprim] 100 mg PO DAILY  tablet 02/13/21 [Rx]


dexAMETHasone [Dexamethasone] 40 mg PO Q7D  tablet 02/13/21 [Rx]


diazePAM [Valium] 2 mg PO TID PRN  tablet 02/13/21 [Rx]


polyethylene glycoL 3350 [MiraLAX] 17 gm PO DAILY PRN  packet 02/13/21 [Rx]


traMADol [Ultram] 50 mg PO DAILY@0800  tablet 02/13/21 [Rx]


traMADol [Ultram] 50 mg PO Q6H PRN  tablet 02/13/21 [Rx]


valACYclovir HCl [Valtrex] 500 mg PO DAILY 30 Days #30 tablet 02/13/21 [Rx]











- Discharge Summary/Plan Comment


DC Time >30 min.: Yes





- Patient Data


Vitals - Most Recent: 


                                Last Vital Signs











Temp  98.1 F   02/13/21 10:06


 


Pulse  85   02/13/21 10:06


 


Resp  18   02/13/21 10:06


 


BP  80/40 L  02/13/21 10:06


 


Pulse Ox  95   02/13/21 10:06











Weight - Most Recent: 170 lb 8 oz


Lab Results - Last 24 hrs: 


                         Laboratory Results - last 24 hr











  02/13/21 02/13/21 Range/Units





  09:50 09:50 


 


WBC  0.8 L*   (3.0-10.3)  x10-3/uL


 


RBC  2.38 L   (3.60-5.20)  x10(6)uL


 


Hgb  7.2 L   (11.4-15.5)  g/dL


 


Hct  22.6 L   (34.2-48.2)  %


 


MCV  94.9   (76.7-100.5)  fL


 


MCH  30.4   (23.9-33.9)  pg


 


MCHC  32.0   (31.9-34.8)  g/dL


 


RDW  17.0 H   (12.3-16.5)  %


 


Plt Count  27 L*   (151-488)  x10(3)uL


 


MPV  10.3   (7.1-12.4)  fL


 


Add Manual Diff  Yes   


 


Neutrophils % (Manual)  72   (46-82)  %


 


Band Neutrophils %  4   (0-6)  %


 


Lymphocytes % (Manual)  10 L   (13-37)  %


 


Monocytes % (Manual)  6   (4-12)  %


 


Eosinophils % (Manual)  4   (0-5)  %


 


Myelocytes %  4 H   (0-0)  %


 


Polychromasia  Moderate H   


 


Hypochromasia  Few   


 


Poikilocytosis  Moderate H   


 


Basophilic Stippling  Few H   


 


Microcytosis  Few   


 


Macrocytosis  Occasional   


 


Spherocytes  Few H   


 


Tear Drop Cells  Moderate H   


 


Sodium   138  (135-145)  mmol/L


 


Potassium   3.3 L D  (3.5-5.3)  mmol/L


 


Chloride   103  (100-110)  mmol/L


 


Carbon Dioxide   21  (21-32)  mmol/L


 


BUN   34 H  (7-18)  mg/dL


 


Creatinine   1.3 H  (0.55-1.02)  mg/dL


 


Est Cr Clr Drug Dosing   27.68  mL/min


 


Estimated GFR (MDRD)   40 L  (>60)  


 


BUN/Creatinine Ratio   26.2 H  (9-20)  


 


Glucose   113  ()  mg/dL


 


Calcium   9.6  (8.6-10.2)  mg/dL


 


Total Bilirubin   0.2  (0.1-1.3)  mg/dL


 


AST   38 H D  (5-25)  IU/L


 


ALT   29  (12-36)  U/L


 


Alkaline Phosphatase   67  ()  IU/L


 


Total Protein   5.0 L  (6.0-8.0)  g/dL


 


Albumin   2.8 L  (3.2-4.6)  g/dL


 


Globulin   2.2  g/dL


 


Albumin/Globulin Ratio   1.3  











Med Orders - Current: 


                               Current Medications





Acetaminophen (Tylenol Extra Strength)  500 mg PO Q6H [facility]


   Last Admin: 02/13/21 08:23 Dose:  500 mg


   Documented by: 


Albuterol/Ipratropium (Duoneb 3.0-0.5 Mg/3 Ml)  3 ml INH 0700,1100,1500,1900 [facility]


   Last Admin: 02/13/21 06:09 Dose:  3 ml


   Documented by: 


Allopurinol (Zyloprim)  100 mg PO DAILY [facility]


   Last Admin: 02/13/21 08:30 Dose:  100 mg


   Documented by: 


Aspirin (Halfprin)  81 mg PO DAILY [facility]


   Last Admin: 02/13/21 08:26 Dose:  81 mg


   Documented by: 


Budesonide (Pulmicort)  0.5 mg INH BID@0700,1900 [facility]


   Last Admin: 02/13/21 06:09 Dose:  0.5 mg


   Documented by: 


Calcium Carbonate/Glycine (Oyster Shell Calcium)  500 mg PO BID [facility]


   Last Admin: 02/13/21 08:28 Dose:  500 mg


   Documented by: 


Dexamethasone (Dexamethasone)  40 mg PO Q7D [facility]


   Last Admin: 02/09/21 14:19 Dose:  40 mg


   Documented by: 


Diazepam (Valium)  2 mg PO TID PRN


   PRN Reason: Muscle Spasm


   Last Admin: 02/12/21 20:05 Dose:  2 mg


   Documented by: 


Enoxaparin Sodium (Lovenox)  40 mg SUBCUT BEDTIME [facility]


   Last Admin: 02/12/21 20:06 Dose:  40 mg


   Documented by: 


Ferrous Sulfate (Ferrous Sulfate)  325 mg PO DAILY [facility]


   Last Admin: 02/13/21 08:26 Dose:  325 mg


   Documented by: 


Sodium Chloride (Normal Saline)  1,000 mls @ 100 mls/hr IV ASDIRECTED [facility]


Ibuprofen (Motrin)  200 mg PO Q6H [facility]


   Last Admin: 02/13/21 08:24 Dose:  200 mg


   Documented by: 


Levothyroxine Sodium (Synthroid)  50 mcg PO DAILY [facility]


   Last Admin: 02/13/21 08:28 Dose:  50 mcg


   Documented by: 


Melatonin (Melatonin)  6 mg PO BEDTIME PRN


   PRN Reason: Insomnia


   Last Admin: 02/12/21 20:06 Dose:  6 mg


   Documented by: 


Montelukast Sodium (Singulair)  10 mg PO BEDTIME [facility]


   Last Admin: 02/12/21 20:05 Dose:  10 mg


   Documented by: 


Revlimid 25mg (Capsule *Ptom)  0 each PO DAILY [facility]


   Stop: 02/22/21 09:01


   Last Admin: 02/13/21 08:34 Dose:  1 each


   Documented by: 


Ondansetron HCl (Zofran Odt)  4 mg PO Q6H PRN


   PRN Reason: Nausea/Vomiting


   Last Admin: 02/11/21 12:59 Dose:  4 mg


   Documented by: 


Pantoprazole Sodium (Protonix***)  40 mg PO DAILY@0600 [facility]


   Last Admin: 02/13/21 06:09 Dose:  40 mg


   Documented by: 


Polyethylene Glycol (Miralax)  17 gm PO DAILY PRN


   PRN Reason: CONSTIPATION


Senna/Docusate Sodium (Senna Plus)  1 tab PO BID [facility]


   Last Admin: 02/13/21 08:28 Dose:  Not Given


   Documented by: 


Sodium Chloride (Saline Flush)  10 ml FLUSH ASDIRECTED PRN


   PRN Reason: Keep Vein Open


   Last Admin: 02/12/21 12:40 Dose:  10 ml


   Documented by: 


Tramadol HCl (Ultram)  50 mg PO Q6H PRN


   PRN Reason: Pain (4-6/10)


   Last Admin: 02/13/21 02:35 Dose:  50 mg


   Documented by: 


Tramadol HCl (Ultram)  50 mg PO DAILY@0800 [facility]


   Last Admin: 02/13/21 08:19 Dose:  50 mg


   Documented by: 


Trimethoprim/Sulfamethoxazole (Septra Ds)  1 tab PO MoWeFr@0900 [facility]


   Last Admin: 02/12/21 08:08 Dose:  1 tab


   Documented by: 


Valacyclovir HCl (Valtrex)  500 mg PO DAILY [facility]


   Last Admin: 02/13/21 08:29 Dose:  500 mg


   Documented by: 





Discontinued Medications





Albuterol/Ipratropium (Duoneb 3.0-0.5 Mg/3 Ml)  3 ml INH QID [facility]


   Last Admin: 01/13/21 12:42 Dose:  3 ml


   Documented by: 


Budesonide (Pulmicort)  0.5 mg INH BID [facility]


   Last Admin: 01/13/21 08:56 Dose:  0.5 mg


   Documented by: 


Diazepam (Valium)  2 mg PO BEDTIME PRN


   PRN Reason: Muscle Spasm


   Last Admin: 01/18/21 21:24 Dose:  2 mg


   Documented by: 


Sodium Chloride (Normal Saline)  1,000 mls @ 999 mls/hr IV .BOLUS ONE


   Stop: 02/12/21 13:38


   Last Admin: 02/12/21 12:41 Dose:  999 mls/hr


   Documented by: 


Magnesium Citrate (Citrate Of Magnesia)  296 ml PO ONETIME ONE


   Stop: 01/30/21 08:18


   Last Admin: 01/30/21 08:42 Dose:  296 ml


   Documented by: 


Morphine Sulfate (Morphine)  15 mg PO Q4H PRN


   PRN Reason: Pain (7-10/10)


Mupirocin (Bactroban Oint)  1 gm TOP BID [facility]


   Last Admin: 02/11/21 09:00 Dose:  Not Given


   Documented by: 


Pantoprazole Sodium (Protonix***)  40 mg PO DAILY PRN


   PRN Reason: Dyspepsia


   Last Admin: 01/26/21 04:23 Dose:  40 mg


   Documented by: 


Saccharomyces Boulardii (Florastor)  250 mg PO BID [facility]


   Stop: 01/26/21 21:01


   Last Admin: 01/26/21 20:10 Dose:  250 mg


   Documented by: 


Trimethoprim/Sulfamethoxazole (Septra Ds)  1 tab PO BID [facility]


   Stop: 01/26/21 09:01


   Last Admin: 01/26/21 08:37 Dose:  1 tab


   Documented by:

## 2021-02-13 NOTE — PCM.HP.2
H&P History of Present Illness





- General


Date of Service: 21


Admit Problem/Dx: 


                           Admission Diagnosis/Problem





Admission Diagnosis/Problem      Neutropenic fever








Source of Information: Patient, Family, Old Records, Provider





- History of Present Illness


Initial Comments - Free Text/Narative: 


Melinda was admitted to swing bed for rehab services on 21 for left humeral 

fracture, non weight bearing, s/p left hip ORIF, weight bearing as tolerated. 

She was found to have multiple myeloma on SPEP/UPEP done at [facility], 

oncology requested whole body CT scan which was done here. It showed multiple my

eloma in her cranium, cervical, thoracic, lumbar spine, right distal radius, 

right acetabulum, left humerus & left hip. She received 5 rounds of radiation 

last week. Was started on Revlimid, Velcade SQ and dexamethasone 40 mg q7days on

, Bactrim DS MoWe and Valtrex 500 mg daily for prophylaxis. She was 

hyperuricemic so was also started on Allopurinol. She developed diarrhea on 

Friday(yesterday) with hypotension, called was placed to med oncology but did 

not hear back from them. IV NS bolus 1 Liter was given and NS at 100 ml/hr but 

no change in her blood pressure. Remained afebrile, was pale, blood pressure 

still 80/40, fatigued so labs ordered: WBC 0.8, , Hgb 7.2, platelets 27. 

Chemistry was normal except for K 3.3, Creatinine 1.3, Albumin 2.8, Total 

protein 5.0. Chest x-ray pending and Urine pending. Blood culture pending. Spoke

with Oncology on call at [facility]: Recommended treating for neutropenic fever, 

get stool culture & c. diff, start Cefepime & Vancomycin, Neupogen 480 mcg 

daily, transfuse 1 unit of PRBCs, if platelets drop below 20, then will need to 

transfuse 1 unit of platelets as well. She is currently stable so will change 

her to acute care inpatient status, get above treatments started. Spoke with 

patient and her daughter Stephanie who is medical power of , they both 

would like to stay here and try treatments and only be transferred if she r

equires higher level of care. Family also wanted us to check to see if she could

get her Velcade injections here when she doesn't have doctor appointments as it 

has been hard on her to travel for a 2 minute procedure. Will call Arlene at 

Med/Onc on Monday to get this arranged.








- Related Data


Allergies/Adverse Reactions: 


                                    Allergies











Allergy/AdvReac Type Severity Reaction Status Date / Time


 


banana [Banana] Allergy  Respiratory Verified 21 11:20





   Depression  


 


corn [Tennyson] Allergy  Respiratory Verified 21 11:20





   Depression  


 


iodine Allergy  Rash Verified 21 11:20


 


nut - unspecified Allergy  Swollen Verified 21 13:54





   Tongue  


 


oxycodone [Oxycodone] Allergy  Itching Verified 21 11:20


 


shellfish derived Allergy  Rash Verified 21 11:20











Home Medications: 


                                    Home Meds





Albuterol/Ipratropium [DuoNeb 3.0-0.5 MG/3 ML] 3 ml INH QID 17 [History]


Budesonide [Pulmicort] 0.5 mg IH BID 17 [History]


Levothyroxine Sodium 50 mcg PO DAILY 17 [History]


Montelukast [Singulair] 10 mg PO BEDTIME 17 [History]


Omeprazole Magnesium [Prilosec Otc] 20 mg PO DAILY PRN 17 [History]


Calcium Carbonate/Vitamin D3 [Calcium 600-Vit D3 200 Tablet] 1 tab PO BID 

21 [History]


Ferrous Sulfate 325 mg PO DAILY 21 [History]


Acetaminophen [Tylenol Extra Strength] 500 mg PO Q6H  tablet 21 [Rx]


Aspirin [Halfprin] 81 mg PO DAILY  tab.ec 21 [Rx]


Ibuprofen [Motrin] 200 mg PO Q6H  tablet 21 [Rx]


Melatonin 6 mg PO BEDTIME PRN  tablet 21 [Rx]


Non-Formulary Medication [NF Drug] 0 each PO DAILY  each 21 [Rx]


Ondansetron [Zofran ODT] 4 mg PO Q6H PRN  tab.dis 21 [Rx]


Sodium Chloride 0.9% [Saline Flush] 10 ml FLUSH ASDIRECTED PRN  syringe 21

[Rx]


Sulfamethoxazole/Trimethoprim [Septra DS] 1 tab PO MoWeFr@0900  tablet 21 

[Rx]


allopurinoL [Zyloprim] 100 mg PO DAILY  tablet 21 [Rx]


dexAMETHasone [Dexamethasone] 40 mg PO Q7D  tablet 21 [Rx]


diazePAM [Valium] 2 mg PO TID PRN  tablet 21 [Rx]


polyethylene glycoL 3350 [MiraLAX] 17 gm PO DAILY PRN  packet 21 [Rx]


traMADol [Ultram] 50 mg PO DAILY@0800  tablet 21 [Rx]


traMADol [Ultram] 50 mg PO Q6H PRN  tablet 21 [Rx]


valACYclovir HCl [Valtrex] 500 mg PO DAILY 30 Days #30 tablet 21 [Rx]











Past Medical History


HEENT History: Reports: Cataract


Cardiovascular History: 


Respiratory History: Reports: Asthma


Gastrointestinal History: Reports: Colon Polyp, GERD


Genitourinary History: Reports: None


OB/GYN History: Reports: Pregnancy


Other OB/BYN History: 


Musculoskeletal History: Reports: Arthritis, Fracture, Osteoarthritis, Other 

(See Below)


Other Musculoskeletal History: DJD


Neurological History: Reports: None


Psychiatric History: Reports: None


Endocrine/Metabolic History: Reports: Hypothyroidism, Obesity/BMI 30+


Hematologic History: Reports: Iron Deficiency


Immunologic History: Reports: None


Oncologic (Cancer) History: Reports: None


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 

disease 21


Dermatologic History: Reports: None





- Infectious Disease History


Infectious Disease History: Reports: Chicken Pox, Measles, Mumps





- Past Surgical History


Head Surgeries/Procedures: Reports: None


HEENT Surgical History: Reports: Adenoidectomy, Cataract Surgery, Tonsillectomy


Other HEENT Surgeries/Procedures: bilat cataract


GI Surgical History: Reports: Colonoscopy


Female  Surgical History: Reports: Hysterectomy, Salpingo-Oophorectomy


Musculoskeletal Surgical History: Reports: Arthroscopic Knee, Knee Replacement, 

Other (See Below)


Other Musculoskeletal Surgeries/Procedures:: partial R knee replacement, left 

hip replacement 21, left should fracture 21





Social & Family History





- Family History


Family Medical History: No Pertinent Family History





- Caffeine Use


Caffeine Use: Reports: None





H&P Review of Systems





- Review of Systems:


Review Of Systems: See Below


General: Reports: Weakness, Fatigue, Decreased Appetite.  Denies: Fever


HEENT: Reports: No Symptoms


Pulmonary: Reports: No Symptoms


Cardiovascular: Reports: Lightheadedness, Blood Pressure Problem.  Denies: Chest

 Pain, Palpitations, Edema


Gastrointestinal: Reports: Anorexia, Diarrhea, Decreased Appetite.  Denies: 

Abdominal Pain, Constipation, Nausea, Vomiting


Genitourinary: Reports: No Symptoms


Musculoskeletal: Reports: Shoulder Pain, Leg Pain


Skin: Reports: Pallor


Psychiatric: Reports: Confusion (foggy per patient, & her daughter)


Neurological: Denies: Trouble Speaking


Hematologic/Lymphatic: Reports: Anemia, Easy Bruising.  Denies: Easy Bleeding


Immunologic: Reports: Food Allergy, Environmental Allergy





Exam





- Exam


Exam: See Below





- Exam


Quality Assessment: No: Supplemental Oxygen


General: Alert, Oriented (person, place & time but forgetful of some 

conversations)


HEENT: PERRLA, Conjunctiva Clear, EOMI, Hearing Intact


Lungs: Clear to Auscultation, Normal Respiratory Effort.  No: Wheezing


Cardiovascular: Regular Rate, Regular Rhythm, Systolic Murmur


GI/Abdominal Exam: Normal Bowel Sounds, Soft, Non-Tender, No Distention


 (Female) Exam: Deferred


Rectal (Female) Exam: Deferred


Extremities: No Pedal Edema, Pallor, Other (left arm in envelope sling)


Peripheral Pulses: 2+: Radial (L), Radial (R)


Skin: Warm, Dry, Intact


Neurological: Normal Speech, Normal Tone


Neuro Extensive - Mental Status: Alert, Slow Response to Commands.  No: 

Disorientation to Person, Disorientation to Place, Disorientation to Time





- Patient Data


Lab Results Last 24 hrs: 


                         Laboratory Results - last 24 hr











  21 Range/Units





  09:50 


 


Blood Type  A POSITIVE  


 


Gel Antibody Screen  Negative  


 


Crossmatch  See Detail  














*Q Meaningful Use (ADM)





- VTE *Q


VTE Mechanical Contraindications *Q: At Risk for Falls


VTE Pharmacological Contraindications *Q: Bld Coagulation Disorder





- VTE Risk Assess *Q


Each Risk Factor Represents 1 Point: Age 41 - 59 years


Total Score 1 Point Risk Factors: 1


Each Risk Factor Represents 2 Points: Age 60 - 74 Years, Malignancy (present or 

previous)


Total Score 2 Point Risk Factors: 4


Each Risk Factor Represents 3 Points: None


Total Score 3 Point Risk Factors: 0


Each Risk Factor Represents 5 Points: None


Total Score 5 Point Risk Factors: 0


Venous Thromboembolism Risk Factor Score *Q: 5





- Problem List


(1) Pancytopenia due to chemotherapy


SNOMED Code(s): 2808570, 979776486


   ICD Code: D61.810 - ANTINEOPLASTIC CHEMOTHERAPY INDUCED PANCYTOPENIA   

Status: Acute   Current Visit: No   





(2) Neutropenic fever


SNOMED Code(s): 567794749


   ICD Code: D70.9 - NEUTROPENIA, UNSPECIFIED; R50.81 - FEVER PRESENTING WITH 

CONDITIONS CLASSIFIED ELSEWHERE   Status: Acute   Current Visit: No   





(3) Diarrhea


SNOMED Code(s): 48029773


   ICD Code: R19.7 - DIARRHEA, UNSPECIFIED   Status: Acute   Current Visit: No  

 





(4) Hypotensive episode


SNOMED Code(s): 88350540


   ICD Code: I95.9 - HYPOTENSION, UNSPECIFIED   Status: Acute   Current Visit: 

No   





(5) Multiple myeloma


SNOMED Code(s): 683089410


   ICD Code: C90.00 - MULTIPLE MYELOMA NOT HAVING ACHIEVED REMISSION   Status: 

Acute   Current Visit: No   Onset Date: ~21   





(6) Intertrochanteric fracture, hip


SNOMED Code(s): 256140279


   ICD Code: S72.143A - DISPLACED INTERTROCHANTERIC FRACTURE OF UNSP FEMUR, INIT

   Status: Acute   Current Visit: No   Onset Date: ~21   





(7) Closed fracture of left proximal humerus


SNOMED Code(s): 80750869


   ICD Code: S42.202A - UNSP FRACTURE OF UPPER END OF LEFT HUMERUS, INIT FOR 

CLOS FX   Status: Acute   Current Visit: No   Onset Date: ~21   





(8) Pathological fracture due to neoplastic disease


SNOMED Code(s): 265732612


   ICD Code: M84.50XA - PATHOLOGICAL FRACTURE IN NEOPLASTIC DISEASE, UNSP SITE, 

INIT   Status: Acute   Current Visit: No   


Qualifiers: 


   Site of pathological fracture: hip 





(9) Asthma, moderate persistent


SNOMED Code(s): 255120884


   ICD Code: J45.40 - MODERATE PERSISTENT ASTHMA, UNCOMPLICATED   Status: 

Chronic   Current Visit: No   





(10) Esophageal reflux


SNOMED Code(s): 010997565


   ICD Code: K21.9 - GASTRO-ESOPHAGEAL REFLUX DISEASE WITHOUT ESOPHAGITIS   

Status: Chronic   Current Visit: No   





(11) Obesity (BMI 35.0-39.9 without comorbidity)


SNOMED Code(s): 451199373, 286051236


   ICD Code: E66.9 - OBESITY, UNSPECIFIED   Status: Chronic   Current Visit: No 

  





(12) Sciatica


SNOMED Code(s): 30209532


   ICD Code: M54.30 - SCIATICA, UNSPECIFIED SIDE   Status: Chronic   Current 

Visit: No   


Qualifiers: 


 





(13) Hypothyroidism


SNOMED Code(s): 38479288


   ICD Code: E03.9 - HYPOTHYROIDISM, UNSPECIFIED   Status: Chronic   Current 

Visit: No   


Problem List Initiated/Reviewed/Updated: Yes


Orders Last 24hrs: 


                               Active Orders 24 hr











 Category Date Time Status


 


 Patient Status [ADT] Routine ADT  21 11:49 Active


 


 Dietary Supplements [RC] TIDAC Care  21 11:55 Active


 


 Height [Height and Weight] [RC] DAILY Care  21 11:54 Active


 


 Nurse Communication: Isolation [RC] ASDIRECTED Care  21 11:56 Active


 


 Oxygen Therapy [RC] PRN Care  21 11:49 Active


 


 Peripheral IV Care [RC] .AS DIRECTED Care  21 12:03 Active


 


 RT Aerosol Therapy [RC] ASDIRECTED Care  21 12:01 Active


 


 Up With Assistance [RC] ASDIRECTED Care  21 11:49 Active


 


 Up ad Rachel [RC] ASDIRECTED Care  21 11:49 Active


 


 VTE/DVT Education [RC] Per Unit Routine Care  21 11:49 Active


 


 Vital Signs [RC] Q4H Care  21 11:49 Active


 


 Warming Measures [Cooling Warming Measures] [RC] Care  21 11:55 Active





 ASDIRECTED   


 


 OT Evaluation and Treatment [CONS] Routine Cons  21 11:49 Active


 


 PT Evaluation and Treatment [CONS] Routine Cons  21 11:49 Active


 


 Regular Diet [DIET] Diet  21 Dinner Active


 


 BASIC METABOLIC PANEL,BMP [CHEM] Routine Lab  21 06:00 Ordered


 


 C DIFFICILE AG/TOXIN W/REFLEX [RM] Urgent Lab  21 11:49 Ordered


 


 CBC WITH AUTO DIFF [HEME] Routine Lab  21 06:00 Ordered


 


 PATIENT RETYPE [BBK] Routine Lab  21 09:50 Results


 


 RED BLOOD CELLS LP [BBK] Routine Lab  21 09:50 Results


 


 STOOL CULTURE Urgent Lab  21 11:49 Ordered


 


 TYPE AND SCREEN [BBK] Routine Lab  21 09:50 Results


 


 UA W/MICROSCOPIC [URIN] Routine Lab  21 11:49 Ordered


 


 VANCOMYCIN TROUGH [CHEM] Timed Lab  21 12:30 Ordered


 


 Acetaminophen [Tylenol Extra Strength] Med  21 12:00 Active





 500 mg PO Q6H   


 


 Albuterol/Ipratropium [DuoNeb 3.0-0.5 MG/3 ML] Med  21 12:30 Active





 3 ml INH QIDRT   


 


 Aspirin [Halfprin] Med  21 09:00 Active





 81 mg PO DAILY   


 


 Budesonide [Pulmicort] Med  21 21:00 Active





 0.5 mg NEB BIDRT   


 


 Calcium Carbonate [Oyster Shell Calcium] Med  21 21:00 Active





 500 mg PO BID   


 


 Cefepime [Maxipime] Med  21 13:00 Active





 2 gm IVPUSH Q8H   


 


 Ferrous Sulfate Med  21 09:00 Active





 325 mg PO DAILY   


 


 Ibuprofen [Motrin] Med  21 12:00 Active





 200 mg PO Q6H   


 


 Levothyroxine [Synthroid] Med  21 07:30 Active





 50 mcg PO ACBREAKFAST   


 


 Melatonin Med  21 11:58 Active





 6 mg PO BEDTIME PRN   


 


 Montelukast [Singulair] Med  21 21:00 Active





 10 mg PO BEDTIME   


 


 Non-Formulary Medication [NF Drug] Med  21 09:00 Active





 1 each PO DAILY   


 


 Ondansetron [Zofran ODT] Med  21 11:58 Active





 4 mg PO Q6H PRN   


 


 Pantoprazole [ProTONIX***] Med  21 11:58 Active





 40 mg PO DAILY PRN   


 


 Pharmacy to Dose - Vancomycin Med  21 12:15 Pending





 1 dose .XX ASDIRECTED   


 


 Saccharomyces Boulardii [Florastor] Med  21 21:00 Active





 250 mg PO BID   


 


 Sodium Chloride 0.9% [Normal Saline] 1,000 ml Med  21 12:15 Active





 IV ASDIRECTED   


 


 Sodium Chloride 0.9% [Normal Saline] 250 ml Med  21 12:00 Active





 IV ASDIRECTED   


 


 Sodium Chloride 0.9% [Saline Flush] Med  21 11:49 Active





 10 ml FLUSH ASDIRECTED PRN   


 


 Sodium Chloride 0.9% [Saline Flush] Med  21 11:58 Active





 10 ml FLUSH ASDIRECTED PRN   


 


 Sulfamethoxazole/Trimethoprim [Septra DS] Med  02/15/21 09:00 Active





 1 tab PO MoWeFr@0900   


 


 Vancomycin/Water for INJ (PEG) [Vancomycin 1 GM/200 ML Med  21 13:00 

Active





 Premix] 1 gm   





 Premix Bag 1 bag   





 IV Q24H   


 


 Vancomycin/Water for INJ (PEG) [Vancomycin in Water 2 Med  21 13:00 

Active





 GM/400 ML] 2 gm   





 Premix Bag 1 bag   





 IV ONETIME   


 


 allopurinoL [Zyloprim] Med  21 09:00 Active





 100 mg PO DAILY   


 


 dexAMETHasone Med  21 09:00 Active





 40 mg PO Q7D   


 


 diazePAM [Valium] Med  21 11:58 Active





 2 mg PO TID PRN   


 


 polyethylene glycoL 3350 [MiraLAX] Med  21 11:58 Active





 17 gm PO DAILY PRN   


 


 traMADol [Ultram] Med  21 08:00 Active





 50 mg PO DAILY@0800   


 


 traMADol [Ultram] Med  21 11:58 Active





 50 mg PO Q6H PRN   


 


 valACYclovir [Valtrex] Med  21 09:00 Active





 500 mg PO DAILY   


 


 Antiembolic Hose [OM.PC] Per Unit Routine Ot  21 11:51 Ordered


 


 Isolation [COMM] Stat Ot  21 11:56 Ordered


 


 Peripheral IV Insertion Adult [OM.PC] Routine Ot  21 11:49 Ordered


 


 Transfuse PRBC [Transfuse Red Blood Cells] [COMM] Ot  21 11:56 Ordered





 Routine   


 


 VTE Pharmacological Contraindications [AST] Per Unit Ot  21 11:49 

Ordered





 Routine   


 


 Resuscitation Status Routine Resus Stat  21 11:49 Ordered








                                Medication Orders





Acetaminophen (Tylenol Extra Strength)  500 mg PO Q6H [facility]


Albuterol/Ipratropium (Duoneb 3.0-0.5 Mg/3 Ml)  3 ml INH QIDRT [facility]


Allopurinol (Zyloprim)  100 mg PO DAILY [facility]


Aspirin (Halfprin)  81 mg PO DAILY [facility]


Budesonide (Pulmicort)  0.5 mg NEB BIDRT [facility]


Calcium Carbonate/Glycine (Oyster Shell Calcium)  500 mg PO BID [facility]


Cefepime HCl (Maxipime)  2 gm IVPUSH Q8H [facility]


Dexamethasone (Dexamethasone)  40 mg PO Q7D [facility]


Diazepam (Valium)  2 mg PO TID PRN


   PRN Reason: Muscle Spasm


Ferrous Sulfate (Ferrous Sulfate)  325 mg PO DAILY [facility]


Sodium Chloride (Normal Saline)  250 mls @ 100 mls/hr IV ASDIRECTED [facility]


Sodium Chloride (Normal Saline)  1,000 mls @ 100 mls/hr IV ASDIRECTED [facility]


Vancomycin HCl 2 gm/ Premix  400 mls @ 200 mls/hr IV ONETIME ONE


   Stop: 21 14:59


Vancomycin HCl 1 gm/ Premix  200 mls @ 200 mls/hr IV Q24H [facility]


Ibuprofen (Motrin)  200 mg PO Q6H [facility]


Levothyroxine Sodium (Synthroid)  50 mcg PO ACBREAKFAST [facility]


Melatonin (Melatonin)  6 mg PO BEDTIME PRN


   PRN Reason: Insomnia


Montelukast Sodium (Singulair)  10 mg PO BEDTIME [facility]


Revlimid 25 Mg (Capsule *Pt Own Med*)  1 each PO DAILY [facility]


Ondansetron HCl (Zofran Odt)  4 mg PO Q6H PRN


   PRN Reason: Nausea/Vomiting


Pantoprazole Sodium (Protonix***)  40 mg PO DAILY PRN


   PRN Reason: Dyspepsia


Polyethylene Glycol (Miralax)  17 gm PO DAILY PRN


   PRN Reason: CONSTIPATION


Saccharomyces Boulardii (Florastor)  250 mg PO BID [facility]


Sodium Chloride (Saline Flush)  10 ml FLUSH ASDIRECTED PRN


   PRN Reason: Keep Vein Open


Sodium Chloride (Saline Flush)  10 ml FLUSH ASDIRECTED PRN


   PRN Reason: Keep Vein Open


Tramadol HCl (Ultram)  50 mg PO DAILY@0800 [facility]


Tramadol HCl (Ultram)  50 mg PO Q6H PRN


   PRN Reason: Pain (4-6/10)


Trimethoprim/Sulfamethoxazole (Septra Ds)  1 tab PO MoWeFr@0900 [facility]


Valacyclovir HCl (Valtrex)  500 mg PO DAILY [facility]


Vancomycin HCl (Pharmacy To Dose - Vancomycin)  1 dose .XX ASDIRECTED [facility]








Assessment/Plan Comment:: 





1. Admit to inpatient status from swing bed for neutropenic fever, currently on 

chemotherapy.


2. Neutropenic fever: Cefepime 2 g IV q8h, Vancomycin per pharmacy, trough  

at 1230, Neupogen 480 mg daily(formulary substitution is Neulasta) will check 

with pharmacy and oncology if this is ok to substitute or if need to order. BC 

pending. UA pending. Chest x-ray report pending. No obvious infiltrates per my 

review. Protective isolation precautions. Repeat labs tomorrow. Lactic acid 2.3,

 blood draw at 10am so repeat lactic acid for 1400. 


3. Pancytopenia: Transfuse 1 unit of PRBCs, repeat CBC tomorrow, if platelets 

<27 will transfuse 1 unit of platelets as well. 


4. Multiple myeloma: Revlimid 25 mg daily through 21. Velcade weekly and 

Dexamethasone 40 mg q7d every Tuesday with Velcade. Will check with Arlene at 

[facility] Med/Onc on Monday in regards to her Velcade injection and if it can be 

done here when she doesn't have an appt with Dr Chan. 


5. Asthma: Continue home medications.


6. DVT prophylaxis: TEDs CARMENCITA, medication contraindicated due to 

thrombocytopenia.


7. CODE STATUS: FULL. Daughter Stephanie is been made medical power of  

and their son Robert has been made financial power of . Stephanie contact 

number is [phone].





- Mortality Measure


Prognosis:: Good

## 2021-02-14 NOTE — PCM.PN
- General Info


Date of Service: 02/14/21


Subjective Update: 





Melinda is sitting up in the chair, denies any lightheadedness, stood up as her 

bottom was getting sore from sitting, stated it took a few times to stand up but

didn't have worsening dizziness with it. No nausea or vomiting. Stools are more 

mushy currently. Unable to get C. Difficile tested, stools were too formed. UA 

collected. Denies any shortness of breath, chest pain or wheezing. 





- Patient Data


Vitals - Most Recent: 


                                Last Vital Signs











Temp  98.4 F   02/14/21 12:22


 


Pulse  78   02/14/21 12:22


 


Resp  18   02/14/21 12:22


 


BP  86/37 L  02/14/21 12:22


 


Pulse Ox  98   02/14/21 12:22











Weight - Most Recent: 178 lb 4 oz


I&O - Last 24 Hours: 


                                 Intake & Output











 02/13/21 02/14/21 02/14/21





 22:59 06:59 14:59


 


Intake Total 1028 771 0


 


Balance 1028 771 0











Lab Results Last 24 Hours: 


                         Laboratory Results - last 24 hr











  02/13/21 02/13/21 02/13/21 Range/Units





  09:50 09:50 14:10 


 


WBC     (3.0-10.3)  x10-3/uL


 


Corrected WBC     (4.5-12.0)  X10(3)


 


RBC     (3.60-5.20)  x10(6)uL


 


Hgb     (11.4-15.5)  g/dL


 


Hct     (34.2-48.2)  %


 


MCV     (76.7-100.5)  fL


 


MCH     (23.9-33.9)  pg


 


MCHC     (31.9-34.8)  g/dL


 


RDW     (12.3-16.5)  %


 


Plt Count     (151-488)  x10(3)uL


 


MPV     (7.1-12.4)  fL


 


Add Manual Diff     


 


Neutrophils % (Manual)     (46-82)  %


 


Band Neutrophils %     (0-6)  %


 


Lymphocytes % (Manual)     (13-37)  %


 


Metamyelocytes %     (0-0)  %


 


Nucleated RBCs     (0-0)  /100WBC


 


Plt Morphology Comment     


 


Polychromasia     


 


Hypochromasia     


 


Poikilocytosis     


 


Basophilic Stippling     


 


Microcytosis     


 


Macrocytosis     


 


Tear Drop Cells     


 


Sodium     (135-145)  mmol/L


 


Potassium     (3.5-5.3)  mmol/L


 


Chloride     (100-110)  mmol/L


 


Carbon Dioxide     (21-32)  mmol/L


 


BUN     (7-18)  mg/dL


 


Creatinine     (0.55-1.02)  mg/dL


 


Est Cr Clr Drug Dosing     mL/min


 


Estimated GFR (MDRD)     (>60)  


 


BUN/Creatinine Ratio     (9-20)  


 


Glucose     ()  mg/dL


 


Lactic Acid   Cancelled  0.8  


 


Calcium     (8.6-10.2)  mg/dL


 


Urine Color     (YELLOW)  


 


Urine Appearance     (CLEAR)  


 


Urine pH     (5.0-6.5)  


 


Ur Specific Gravity     (1.010-1.025)  


 


Urine Protein     (NEGATIVE)  mg/dL


 


Urine Glucose (UA)     (NORMAL)  mg/dL


 


Urine Ketones     (NEGATIVE)  mg/dL


 


Urine Occult Blood     (NEGATIVE)  


 


Urine Nitrite     (NEGATIVE)  


 


Urine Bilirubin     (NEGATIVE)  


 


Urine Urobilinogen     (NEGATIVE)  mg/dL


 


Ur Leukocyte Esterase     (NEGATIVE)  


 


Urine RBC     (0-5)  


 


Urine WBC     (0-5)  


 


Ur Squamous Epith Cells     (NS,R,O)  


 


Urine Bacteria     (NS)  


 


Blood Type  A POSITIVE    


 


Gel Antibody Screen  Negative    


 


Crossmatch  See Detail    














  02/13/21 02/14/21 02/14/21 Range/Units





  16:40 06:40 06:40 


 


WBC   0.5 L*   (3.0-10.3)  x10-3/uL


 


Corrected WBC   0.5 L*   (4.5-12.0)  X10(3)


 


RBC   2.60 L   (3.60-5.20)  x10(6)uL


 


Hgb   7.9 L   (11.4-15.5)  g/dL


 


Hct   24.2 L   (34.2-48.2)  %


 


MCV   93.3   (76.7-100.5)  fL


 


MCH   30.5   (23.9-33.9)  pg


 


MCHC   32.7   (31.9-34.8)  g/dL


 


RDW   16.3   (12.3-16.5)  %


 


Plt Count   17 L*   (151-488)  x10(3)uL


 


MPV   10.4   (7.1-12.4)  fL


 


Add Manual Diff   Yes   


 


Neutrophils % (Manual)   56   (46-82)  %


 


Band Neutrophils %   12 H   (0-6)  %


 


Lymphocytes % (Manual)   28   (13-37)  %


 


Metamyelocytes %   4 H   (0-0)  %


 


Nucleated RBCs   1 H   (0-0)  /100WBC


 


Plt Morphology Comment   See note   


 


Polychromasia   Moderate H   


 


Hypochromasia   Few   


 


Poikilocytosis   Moderate H   


 


Basophilic Stippling   Few H   


 


Microcytosis   Few   


 


Macrocytosis   Occasional   


 


Tear Drop Cells   Few   


 


Sodium    138  (135-145)  mmol/L


 


Potassium    3.5  (3.5-5.3)  mmol/L


 


Chloride    106  (100-110)  mmol/L


 


Carbon Dioxide    21  (21-32)  mmol/L


 


BUN    28 H  (7-18)  mg/dL


 


Creatinine    1.0  (0.55-1.02)  mg/dL


 


Est Cr Clr Drug Dosing    35.99  mL/min


 


Estimated GFR (MDRD)    54 L  (>60)  


 


BUN/Creatinine Ratio    28.0 H  (9-20)  


 


Glucose    97  ()  mg/dL


 


Lactic Acid     


 


Calcium    8.7  (8.6-10.2)  mg/dL


 


Urine Color  Yellow    (YELLOW)  


 


Urine Appearance  Clear    (CLEAR)  


 


Urine pH  5.0    (5.0-6.5)  


 


Ur Specific Gravity  1.020    (1.010-1.025)  


 


Urine Protein  Negative    (NEGATIVE)  mg/dL


 


Urine Glucose (UA)  Normal    (NORMAL)  mg/dL


 


Urine Ketones  Negative    (NEGATIVE)  mg/dL


 


Urine Occult Blood  Negative    (NEGATIVE)  


 


Urine Nitrite  Negative    (NEGATIVE)  


 


Urine Bilirubin  Negative    (NEGATIVE)  


 


Urine Urobilinogen  Normal    (NEGATIVE)  mg/dL


 


Ur Leukocyte Esterase  Negative    (NEGATIVE)  


 


Urine RBC  0-5    (0-5)  


 


Urine WBC  0-5    (0-5)  


 


Ur Squamous Epith Cells  Rare    (NS,R,O)  


 


Urine Bacteria  Rare H    (NS)  


 


Blood Type     


 


Gel Antibody Screen     


 


Crossmatch     











Med Orders - Current: 


                               Current Medications





Acetaminophen (Tylenol Extra Strength)  500 mg PO Q6H [facility]


   Last Admin: 02/14/21 08:14 Dose:  500 mg


   Documented by: 


Albuterol/Ipratropium (Duoneb 3.0-0.5 Mg/3 Ml)  3 ml INH 0700,1100,1500,1900 [facility]


   Last Admin: 02/14/21 10:27 Dose:  3 ml


   Documented by: 


Allopurinol (Zyloprim)  100 mg PO DAILY [facility]


   Last Admin: 02/14/21 08:19 Dose:  100 mg


   Documented by: 


Aspirin (Halfprin)  81 mg PO DAILY [facility]


   Last Admin: 02/14/21 08:19 Dose:  81 mg


   Documented by: 


Budesonide (Pulmicort)  0.5 mg NEB BID@0700,1900 [facility]


   Last Admin: 02/14/21 06:48 Dose:  0.5 mg


   Documented by: 


Calcium Carbonate/Glycine (Oyster Shell Calcium)  500 mg PO BID [facility]


   Last Admin: 02/14/21 08:32 Dose:  Not Given


   Documented by: 


Cefepime HCl (Maxipime)  2 gm IVPUSH Q8H [facility]


   Last Admin: 02/14/21 05:35 Dose:  2 gm


   Documented by: 


Dexamethasone (Dexamethasone)  40 mg PO Q7D [facility]


Diazepam (Valium)  2 mg PO TID PRN


   PRN Reason: Muscle Spasm


   Last Admin: 02/13/21 20:38 Dose:  2 mg


   Documented by: 


Ferrous Sulfate (Ferrous Sulfate)  325 mg PO DAILY [facility]


   Last Admin: 02/14/21 08:18 Dose:  325 mg


   Documented by: 


Vancomycin HCl 1 gm/ Premix  200 mls @ 200 mls/hr IV Q24H [facility]


Sodium Chloride (Normal Saline)  250 mls @ 100 mls/hr IV ASDIRECTED [facility]


Ibuprofen (Motrin)  200 mg PO Q6H [facility]


   Last Admin: 02/14/21 08:14 Dose:  200 mg


   Documented by: 


Levothyroxine Sodium (Synthroid)  50 mcg PO ACBREAKFAST [facility]


   Last Admin: 02/14/21 07:01 Dose:  Not Given


   Documented by: 


Melatonin (Melatonin)  6 mg PO BEDTIME PRN


   PRN Reason: Insomnia


   Last Admin: 02/13/21 20:38 Dose:  6 mg


   Documented by: 


Montelukast Sodium (Singulair)  10 mg PO BEDTIME [facility]


   Last Admin: 02/13/21 20:37 Dose:  10 mg


   Documented by: 


Revlimid 25 Mg (Capsule *Pt Own Med*)  1 each PO DAILY [facility]


   Stop: 02/22/21 09:01


   Last Admin: 02/14/21 08:39 Dose:  1 each


   Documented by: 


Ondansetron HCl (Zofran Odt)  4 mg PO Q6H PRN


   PRN Reason: Nausea/Vomiting


Pantoprazole Sodium (Protonix***)  40 mg PO DAILY PRN


   PRN Reason: Dyspepsia


Polyethylene Glycol (Miralax)  17 gm PO DAILY PRN


   PRN Reason: CONSTIPATION


Saccharomyces Boulardii (Florastor)  250 mg PO BID [facility]


   Last Admin: 02/14/21 10:20 Dose:  250 mg


   Documented by: 


Sodium Chloride (Saline Flush)  10 ml FLUSH ASDIRECTED PRN


   PRN Reason: Keep Vein Open


   Last Admin: 02/13/21 13:53 Dose:  10 ml


   Documented by: 


Tbo-Filgrastim (Granix)  480 mcg SUBCUT DAILY [facility]


Tramadol HCl (Ultram)  50 mg PO DAILY@0800 [facility]


   Last Admin: 02/14/21 08:15 Dose:  50 mg


   Documented by: 


Tramadol HCl (Ultram)  50 mg PO Q6H PRN


   PRN Reason: Pain (4-6/10)


   Last Admin: 02/13/21 23:48 Dose:  50 mg


   Documented by: 


Trimethoprim/Sulfamethoxazole (Septra Ds)  1 tab PO MoWeFr@0900 [facility]


Valacyclovir HCl (Valtrex)  500 mg PO DAILY [facility]


   Last Admin: 02/14/21 08:20 Dose:  500 mg


   Documented by: 


Vancomycin HCl (Pharmacy To Dose - Vancomycin)  1 dose .XX ASDIRECTED [facility]





Discontinued Medications





Acetaminophen (Tylenol Extra Strength)  500 mg PO Q6H [facility]


   Last Admin: 02/13/21 16:00 Dose:  Not Given


   Documented by: 


Albuterol/Ipratropium (Duoneb 3.0-0.5 Mg/3 Ml)  3 ml INH QIDRT [facility]


   Last Admin: 02/13/21 16:00 Dose:  Not Given


   Documented by: 


Budesonide (Pulmicort)  0.5 mg NEB BIDRT [facility]


Furosemide (Lasix)  40 mg IVPUSH NOW STA


   Stop: 02/14/21 10:30


Furosemide (Lasix)  20 mg IVPUSH NOW ONE


   Stop: 02/14/21 12:21


Furosemide (Lasix)  20 mg IVPUSH BTNUNITS ONE


   Stop: 02/14/21 12:21


Sodium Chloride (Normal Saline)  250 mls @ 100 mls/hr IV ASDIRECTED [facility]


Sodium Chloride (Normal Saline)  1,000 mls @ 100 mls/hr IV ASDIRECTED [facility]


   Last Admin: 02/13/21 23:55 Dose:  100 mls/hr


   Documented by: 


Vancomycin HCl 2 gm/ Premix  400 mls @ 200 mls/hr IV ONETIME ONE


   Stop: 02/13/21 14:59


   Last Admin: 02/13/21 13:38 Dose:  200 mls/hr


   Documented by: 


Ibuprofen (Motrin)  200 mg PO Q6H [facility]


   Last Admin: 02/13/21 15:59 Dose:  Not Given


   Documented by: 


Sodium Chloride (Saline Flush)  10 ml FLUSH ASDIRECTED PRN


   PRN Reason: Keep Vein Open











- Exam


General: Alert, Oriented, Cooperative, No Acute Distress


Lungs: Clear to Auscultation, Normal Respiratory Effort, Decreased Breath Sounds

(bibasilar), Crackles (fine, rare )


Cardiovascular: Regular Rate, Regular Rhythm, Murmurs


GI/Abdominal Exam: Normal Bowel Sounds, Soft, Non-Tender, No Distention


Extremities: Pedal Edema (trace), Pallor


Peripheral Pulses: 2+: Radial (L), Radial (R)





Sepsis Event Note





- Evaluation


Sepsis Screening Result: No Definite Risk





- Focused Exam


Vital Signs: 


                                   Vital Signs











  Temp Temp Pulse Resp BP Pulse Ox Pulse Ox


 


 02/14/21 12:22  98.4 F   78  18  86/37 L  98 


 


 02/14/21 07:21        96


 


 02/14/21 04:00   98.1 F  74  16  82/41 L  96 














- Problem List & Annotations


(1) Pancytopenia due to chemotherapy


SNOMED Code(s): 6324335, 028451128


   Code(s): D61.810 - ANTINEOPLASTIC CHEMOTHERAPY INDUCED PANCYTOPENIA   Status:

Acute   Current Visit: No   





(2) Neutropenic fever


SNOMED Code(s): 839144061


   Code(s): D70.9 - NEUTROPENIA, UNSPECIFIED; R50.81 - FEVER PRESENTING WITH 

CONDITIONS CLASSIFIED ELSEWHERE   Status: Acute   Current Visit: No   





(3) Diarrhea


SNOMED Code(s): 67316914


   Code(s): R19.7 - DIARRHEA, UNSPECIFIED   Status: Acute   Current Visit: No   





(4) Hypotensive episode


SNOMED Code(s): 93729356


   Code(s): I95.9 - HYPOTENSION, UNSPECIFIED   Status: Acute   Current Visit: No

  





(5) Multiple myeloma


SNOMED Code(s): 156045350


   Code(s): C90.00 - MULTIPLE MYELOMA NOT HAVING ACHIEVED REMISSION   Status: 

Acute   Current Visit: No   Onset Date: ~01/20/21   





(6) Intertrochanteric fracture, hip


SNOMED Code(s): 183246750


   Code(s): S72.143A - DISPLACED INTERTROCHANTERIC FRACTURE OF UNSP FEMUR, INIT 

 Status: Acute   Current Visit: No   Onset Date: ~01/08/21   





(7) Closed fracture of left proximal humerus


SNOMED Code(s): 28696455


   Code(s): S42.202A - UNSP FRACTURE OF UPPER END OF LEFT HUMERUS, INIT FOR CLOS

FX   Status: Acute   Current Visit: No   Onset Date: ~01/08/21   





(8) Pathological fracture due to neoplastic disease


SNOMED Code(s): 206228787


   Code(s): M84.50XA - PATHOLOGICAL FRACTURE IN NEOPLASTIC DISEASE, UNSP SITE, 

INIT   Status: Acute   Current Visit: No   


Qualifiers: 


   Site of pathological fracture: hip 





(9) Asthma, moderate persistent


SNOMED Code(s): 887539407


   Code(s): J45.40 - MODERATE PERSISTENT ASTHMA, UNCOMPLICATED   Status: Chronic

  Current Visit: No   





(10) Esophageal reflux


SNOMED Code(s): 930660456


   Code(s): K21.9 - GASTRO-ESOPHAGEAL REFLUX DISEASE WITHOUT ESOPHAGITIS   

Status: Chronic   Current Visit: No   





(11) Obesity (BMI 35.0-39.9 without comorbidity)


SNOMED Code(s): 578682342, 007571591


   Code(s): E66.9 - OBESITY, UNSPECIFIED   Status: Chronic   Current Visit: No  







(12) Sciatica


SNOMED Code(s): 73547307


   Code(s): M54.30 - SCIATICA, UNSPECIFIED SIDE   Status: Chronic   Current 

Visit: No   


Qualifiers: 


 





(13) Hypothyroidism


SNOMED Code(s): 20412411


   Code(s): E03.9 - HYPOTHYROIDISM, UNSPECIFIED   Status: Chronic   Current 

Visit: No   





- Problem List Review


Problem List Initiated/Reviewed/Updated: Yes





- My Orders


Last 24 Hours: 


My Active Orders





02/13/21 11:49


Patient Status [ADT] Routine 


Oxygen Therapy [RC] PRN 


Up With Assistance [RC] ASDIRECTED 


Up ad Rachel [RC] ASDIRECTED 


VTE/DVT Education [RC] Per Unit Routine 


Vital Signs [RC] Q4H 


OT Evaluation and Treatment [CONS] Routine 


PT Evaluation and Treatment [CONS] Routine 


C DIFFICILE AG/TOXIN W/REFLEX [RM] Urgent 


Sodium Chloride 0.9% [Saline Flush]   10 ml FLUSH ASDIRECTED PRN 


Peripheral IV Insertion Adult [OM.PC] Routine 


VTE Pharmacological Contraindications [AST] Per Unit Routine 


Resuscitation Status Routine 





02/13/21 11:51


Antiembolic Hose [OM.PC] Per Unit Routine 





02/13/21 11:54


Height [Height and Weight] [RC] 06 





02/13/21 11:55


Dietary Supplements [RC] TIDAC 


Warming Measures [Cooling Warming Measures] [RC] ASDIRECTED 





02/13/21 11:56


Nurse Communication: Isolation [RC] ASDIRECTED 


Isolation [COMM] Stat 





02/13/21 11:58


Melatonin   6 mg PO BEDTIME PRN 


Ondansetron [Zofran ODT]   4 mg PO Q6H PRN 


Pantoprazole [ProTONIX***]   40 mg PO DAILY PRN 


diazePAM [Valium]   2 mg PO TID PRN 


polyethylene glycoL 3350 [MiraLAX]   17 gm PO DAILY PRN 


traMADol [Ultram]   50 mg PO Q6H PRN 





02/13/21 12:01


RT Aerosol Therapy [RC] ASDIRECTED 





02/13/21 12:03


Peripheral IV Care [RC] 00,08,16 





02/13/21 12:15


Pharmacy to Dose - Vancomycin   1 dose .XX ASDIRECTED 





02/13/21 13:00


Cefepime [Maxipime]   2 gm IVPUSH Q8H 





02/13/21 14:00


Acetaminophen [Tylenol Extra Strength]   500 mg PO Q6H 


Ibuprofen [Motrin]   200 mg PO Q6H 





02/13/21 15:00


Albuterol/Ipratropium [DuoNeb 3.0-0.5 MG/3 ML]   3 ml INH 0700,1100,1500,1900 





02/13/21 Dinner


Regular Diet [DIET] 





02/13/21 19:00


Budesonide [Pulmicort]   0.5 mg NEB BID@0700,1900 





02/13/21 21:00


Calcium Carbonate [Oyster Shell Calcium]   500 mg PO BID 


Montelukast [Singulair]   10 mg PO BEDTIME 


Saccharomyces Boulardii [Florastor]   250 mg PO BID 





02/14/21 07:30


Levothyroxine [Synthroid]   50 mcg PO ACBREAKFAST 





02/14/21 08:00


traMADol [Ultram]   50 mg PO DAILY@0800 





02/14/21 08:57


Transfuse PRBC [Transfuse Red Blood Cells] [COMM] Routine 


Transfuse Platelets [COMM] Routine 





02/14/21 09:00


Aspirin [Halfprin]   81 mg PO DAILY 


Ferrous Sulfate   325 mg PO DAILY 


Non-Formulary Medication [NF Drug]   1 each PO DAILY 


Sodium Chloride 0.9% [Normal Saline] 250 ml IV ASDIRECTED 


allopurinoL [Zyloprim]   100 mg PO DAILY 


valACYclovir [Valtrex]   500 mg PO DAILY 





02/14/21 10:30


GONZALO Hose [Antiembolic Hose] [OM.PC] Routine 





02/14/21 13:00


Vancomycin/Water for INJ (PEG) [Vancomycin 1 GM/200 ML Premix] 1 gm   Premix Bag

1 bag IV Q24H 





02/15/21 09:00


Sulfamethoxazole/Trimethoprim [Septra DS]   1 tab PO MoWeFr@0900 


Tbo-Filgrastim [Granix]   480 mcg SUBCUT DAILY 





02/16/21 09:00


dexAMETHasone   40 mg PO Q7D 





02/16/21 12:30


VANCOMYCIN TROUGH [CHEM] Timed 














- Plan


Plan:: 





1. Neutropenic fever: Cefepime 2 g IV q8h, Vancomycin per pharmacy, trough 2/16 

at 1230, Granix 480 mg daily(formulary substitution for Neupogen) ordered, 

should get tomorrow. Dr Weinstein, [facility] advised to continue daily 

until granulocyte count was up to 1000 and ANC was between 900-1000. WBC 0.5, 

 today. She also stated it would take 2-3 days for granulocytes to come 

up. BC no growth to date. UA negative. Chest x-ray report pending. No obvious 

infiltrates per my review. Protective isolation precautions. Repeat labs 

tomorrow. Lactic acid 2.3, repeat yesterday was 0.8. 


2. Pancytopenia: Hgb 7.9, Transfuse 1 unit of PRBCs; platelets: 17, transfuse 1 

unit platelets. Repeat CBC tomorrow.


3. Multiple myeloma: On Revlimid 25 mg daily through 2/22/21, Dr Weinstein 

advised to hold until she sees Dr Reyes next week. Velcade weekly and 

Dexamethasone 40 mg q7d every Tuesday with Velcade. Hold Velcade per Dr Weinstein today, advised that we do have chemo-certified nurse in outpatient 

services and that we can do that here. Dr Reyes is leaving the practice this 

spring so Melinda will be seeing Dr Weinstein most likely.   


4. Asthma: Continue home medications.


5. DVT prophylaxis: TEDs BLE, medication contraindicated due to 

thrombocytopenia.


6. Daughter Stephanie is been made medical power of  and their son Robert 

has been made financial power of . Stephanie contact number is 

[phone].

## 2021-02-15 NOTE — CR
INDICATION:  Short of breath.   



CHEST TWO VIEWS:  AP and lateral views of the chest were obtained 02/13/21 - no 
comparison x-ray, CT chest from 01/2021 is noted for comparison.  



The heart is enlarged as previously.  



The aorta is tortuous.  



Lungs appear to be somewhat hyperaerated with somewhat flattened diaphragm 
leaves prominent AP diameters, suggesting COPD.  



Mild prominence of upper lung field pulmonary vasculature raises question of a 
mild or early CHF with minimal interstitial lung edema suggested by slightly 
prominent interstitial markings.  However, pulmonary fibrosis may also be 
present with this appearance.  



No consolidating pneumonia was identified.  



Posterior sulci and blunting compatible with small pleural effusions.  



Exogenous obesity is again noted.  



Healing fracture site at the proximal left humerus is again noted with what 
appears to be periosteal new bone formation present and moderate deformity.  



IMPRESSION: 

1.  ASHD, cardiomegaly, mild CHF, question minimal interstitial lung edema 
versus fibrosis. 

2.  Probable COPD. 

3.  Healing left humeral fracture site. 

4.  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 
bodies. 

5.  Exogenous obesity. 

6.  Probable small bilateral pleural effusions.  

MTDD

## 2021-02-15 NOTE — PCM.PN
- General Info


Date of Service: 02/15/21


Subjective Update: 





Melinda is feeling a little better today, no lightheadedness or dizziness. Had 

large diarrhea stool follow by watery green stool this morning. Feels better 

now. Coughing but nonproductive. No nausea or vomiting. Edema improved in her 

legs. 





- Patient Data


Vitals - Most Recent: 


                                Last Vital Signs











Temp  98.5 F   02/15/21 09:10


 


Pulse  67   02/15/21 11:23


 


Resp  18   02/15/21 09:10


 


BP  92/46 L  02/15/21 09:10


 


Pulse Ox  96   02/15/21 11:00











Weight - Most Recent: 178 lb 2 oz


I&O - Last 24 Hours: 


                                 Intake & Output











 02/14/21 02/15/21 02/15/21





 22:59 06:59 14:59


 


Intake Total 579  


 


Output Total 800  


 


Balance -221  











Lab Results Last 24 Hours: 


                         Laboratory Results - last 24 hr











  02/13/21 02/15/21 02/15/21 Range/Units





  09:50 06:45 06:45 


 


WBC   0.8 L*   (3.0-10.3)  x10-3/uL


 


RBC   3.14 L   (3.60-5.20)  x10(6)uL


 


Hgb   9.5 L   (11.4-15.5)  g/dL


 


Hct   29.4 L   (34.2-48.2)  %


 


MCV   93.6   (76.7-100.5)  fL


 


MCH   30.4   (23.9-33.9)  pg


 


MCHC   32.5   (31.9-34.8)  g/dL


 


RDW   16.1   (12.3-16.5)  %


 


Plt Count   31 L*   (151-488)  x10(3)uL


 


MPV   10.7   (7.1-12.4)  fL


 


Neut % (Auto)   Cancelled   


 


Lymph % (Auto)   Cancelled   


 


Mono % (Auto)   Cancelled   


 


Eos % (Auto)   Cancelled   


 


Baso % (Auto)   Cancelled   


 


Neut # (Auto)   Cancelled   


 


Lymph # (Auto)   Cancelled   


 


Mono # (Auto)   Cancelled   


 


Eos # (Auto)   Cancelled   


 


Baso # (Auto)   Cancelled   


 


Add Manual Diff   Cancelled   


 


Neutrophils % (Manual)   48   (46-82)  %


 


Band Neutrophils %   8 H   (0-6)  %


 


Lymphocytes % (Manual)   24   (13-37)  %


 


Monocytes % (Manual)   12   (4-12)  %


 


Eosinophils % (Manual)   8 H   (0-5)  %


 


Differential Comment   See note   


 


Poikilocytosis   Few   


 


Anisocytosis   Few   


 


Microcytosis   Few   


 


Macrocytosis   Few   


 


Ovalocytes   Few   


 


Sodium    141  (135-145)  mmol/L


 


Potassium    3.4 L  (3.5-5.3)  mmol/L


 


Chloride    106  (100-110)  mmol/L


 


Carbon Dioxide    22  (21-32)  mmol/L


 


BUN    28 H  (7-18)  mg/dL


 


Creatinine    1.0  (0.55-1.02)  mg/dL


 


Est Cr Clr Drug Dosing    35.99  mL/min


 


Estimated GFR (MDRD)    54 L  (>60)  


 


BUN/Creatinine Ratio    28.0 H  (9-20)  


 


Glucose    93  ()  mg/dL


 


Calcium    9.3  (8.6-10.2)  mg/dL


 


Blood Type  A POSITIVE    


 


Gel Antibody Screen  Negative    


 


Crossmatch  See Detail    











Med Orders - Current: 


                               Current Medications





Acetaminophen (Tylenol Extra Strength)  500 mg PO Q6H [facility]


   Last Admin: 02/15/21 08:18 Dose:  500 mg


   Documented by: 


Albuterol (Proventil Neb Soln)  2.5 mg NEB Q2H PRN


   PRN Reason: Dyspnea


   Last Admin: 02/15/21 01:12 Dose:  2.5 mg


   Documented by: 


Albuterol/Ipratropium (Duoneb 3.0-0.5 Mg/3 Ml)  3 ml INH 0700,1100,1500,1900 [facility]


   Last Admin: 02/15/21 11:06 Dose:  3 ml


   Documented by: 


Allopurinol (Zyloprim)  100 mg PO DAILY [facility]


   Last Admin: 02/15/21 08:20 Dose:  100 mg


   Documented by: 


Aspirin (Halfprin)  81 mg PO DAILY [facility]


   Last Admin: 02/15/21 08:18 Dose:  81 mg


   Documented by: 


Budesonide (Pulmicort)  0.5 mg NEB BID@0700,1900 [facility]


   Last Admin: 02/15/21 06:19 Dose:  0.5 mg


   Documented by: 


Calcium Carbonate/Glycine (Oyster Shell Calcium)  500 mg PO BID [facility]


   Last Admin: 02/15/21 08:22 Dose:  500 mg


   Documented by: 


Cefepime HCl (Maxipime)  2 gm IVPUSH Q8H [facility]


   Last Admin: 02/15/21 05:59 Dose:  2 gm


   Documented by: 


Dexamethasone (Dexamethasone)  40 mg PO Q7D [facility]


Diazepam (Valium)  2 mg PO TID PRN


   PRN Reason: Muscle Spasm


   Last Admin: 02/14/21 20:42 Dose:  2 mg


   Documented by: 


Ferrous Sulfate (Ferrous Sulfate)  325 mg PO DAILY [facility]


   Last Admin: 02/15/21 08:18 Dose:  325 mg


   Documented by: 


Filgrastim-Sndz (Zarxio)  480 mcg SUBCUT Q24H [facility]


   Last Admin: 02/15/21 11:25 Dose:  480 mcg


   Documented by: 


Vancomycin HCl 1 gm/ Premix  200 mls @ 200 mls/hr IV Q24H [facility]


   Last Admin: 02/14/21 14:32 Dose:  200 mls/hr


   Documented by: 


Sodium Chloride (Normal Saline)  250 mls @ 100 mls/hr IV ASDIRECTED [facility]


Ibuprofen (Motrin)  200 mg PO Q6H [facility]


   Last Admin: 02/15/21 08:18 Dose:  200 mg


   Documented by: 


Levothyroxine Sodium (Synthroid)  50 mcg PO ACBREAKFAST [facility]


   Last Admin: 02/15/21 06:45 Dose:  50 mcg


   Documented by: 


Melatonin (Melatonin)  6 mg PO BEDTIME PRN


   PRN Reason: Insomnia


   Last Admin: 02/14/21 20:42 Dose:  6 mg


   Documented by: 


Montelukast Sodium (Singulair)  10 mg PO BEDTIME [facility]


   Last Admin: 02/14/21 20:30 Dose:  10 mg


   Documented by: 


Ondansetron HCl (Zofran Odt)  4 mg PO Q6H PRN


   PRN Reason: Nausea/Vomiting


   Last Admin: 02/15/21 08:31 Dose:  4 mg


   Documented by: 


Pantoprazole Sodium (Protonix***)  40 mg PO ACBREAKFAST [facility]


   Last Admin: 02/15/21 11:06 Dose:  40 mg


   Documented by: 


Polyethylene Glycol (Miralax)  17 gm PO DAILY PRN


   PRN Reason: CONSTIPATION


Saccharomyces Boulardii (Florastor)  250 mg PO BID [facility]


   Last Admin: 02/15/21 08:18 Dose:  250 mg


   Documented by: 


Sodium Chloride (Saline Flush)  10 ml FLUSH ASDIRECTED PRN


   PRN Reason: Keep Vein Open


   Last Admin: 02/15/21 06:06 Dose:  10 ml


   Documented by: 


Tramadol HCl (Ultram)  50 mg PO DAILY@0800 [facility]


   Last Admin: 02/15/21 08:21 Dose:  50 mg


   Documented by: 


Tramadol HCl (Ultram)  50 mg PO Q6H PRN


   PRN Reason: Pain (4-6/10)


   Last Admin: 02/13/21 23:48 Dose:  50 mg


   Documented by: 


Trimethoprim/Sulfamethoxazole (Septra Ds)  1 tab PO MoWeFr@0900 [facility]


   Last Admin: 02/15/21 08:22 Dose:  1 tab


   Documented by: 


Valacyclovir HCl (Valtrex)  500 mg PO DAILY [facility]


   Last Admin: 02/15/21 08:18 Dose:  500 mg


   Documented by: 


Vancomycin HCl (Pharmacy To Dose - Vancomycin)  1 dose .XX ASDIRECTED [facility]





Discontinued Medications





Acetaminophen (Tylenol Extra Strength)  500 mg PO Q6H [facility]


   Last Admin: 02/13/21 16:00 Dose:  Not Given


   Documented by: 


Albuterol/Ipratropium (Duoneb 3.0-0.5 Mg/3 Ml)  3 ml INH QIDRT [facility]


   Last Admin: 02/13/21 16:00 Dose:  Not Given


   Documented by: 


Budesonide (Pulmicort)  0.5 mg NEB BIDRT [facility]


Furosemide (Lasix)  40 mg IVPUSH NOW STA


   Stop: 02/14/21 10:30


   Last Admin: 02/14/21 14:49 Dose:  Not Given


   Documented by: 


Furosemide (Lasix)  20 mg IVPUSH NOW ONE


   Stop: 02/14/21 12:21


   Last Admin: 02/14/21 14:22 Dose:  20 mg


   Documented by: 


Furosemide (Lasix)  20 mg IVPUSH BTNUNITS ONE


   Stop: 02/14/21 12:21


   Last Admin: 02/14/21 14:41 Dose:  20 mg


   Documented by: 


Sodium Chloride (Normal Saline)  250 mls @ 100 mls/hr IV ASDIRECTED [facility]


Sodium Chloride (Normal Saline)  1,000 mls @ 100 mls/hr IV ASDIRECTED [facility]


   Last Admin: 02/13/21 23:55 Dose:  100 mls/hr


   Documented by: 


Vancomycin HCl 2 gm/ Premix  400 mls @ 200 mls/hr IV ONETIME ONE


   Stop: 02/13/21 14:59


   Last Admin: 02/13/21 13:38 Dose:  200 mls/hr


   Documented by: 


Ibuprofen (Motrin)  200 mg PO Q6H [facility]


   Last Admin: 02/13/21 15:59 Dose:  Not Given


   Documented by: 


Revlimid 25 Mg (Capsule *Pt Own Med*)  1 each PO DAILY [facility]


   Stop: 02/22/21 09:01


   Last Admin: 02/14/21 08:39 Dose:  1 each


   Documented by: 


Pantoprazole Sodium (Protonix***)  40 mg PO DAILY PRN


   PRN Reason: Dyspepsia


Sodium Chloride (Saline Flush)  10 ml FLUSH ASDIRECTED PRN


   PRN Reason: Keep Vein Open











- Exam


General: Alert, Oriented, Cooperative, No Acute Distress


Lungs: Clear to Auscultation, Normal Respiratory Effort, Decreased Breath Sounds

(LLL, improved air entry after she coughed.).  No: Crackles, Wheezing


Cardiovascular: Regular Rate, Regular Rhythm, Murmurs


GI/Abdominal Exam: Normal Bowel Sounds, Soft, Non-Tender, No Distention


Extremities: Pedal Edema (trace BLE)


Peripheral Pulses: 2+: Radial (L), Radial (R)





Sepsis Event Note





- Evaluation


Sepsis Screening Result: No Definite Risk





- Focused Exam


Vital Signs: 


                                   Vital Signs











  Temp Pulse Resp BP Pulse Ox Pulse Ox Pulse Ox


 


 02/15/21 11:23   67     


 


 02/15/21 11:00   75      96


 


 02/15/21 09:10  98.5 F  75  18  92/46 L  98  


 


 02/15/21 06:19   79     98 


 


 02/15/21 06:00  98 F  75  18  102/68  98  


 


 02/15/21 01:12   97     97 


 


 02/15/21 00:00  97.6 F  72  16  83/40 L  97  97 














- Problem List & Annotations


(1) Pancytopenia due to chemotherapy


SNOMED Code(s): 5909267, 900316787


   Code(s): D61.810 - ANTINEOPLASTIC CHEMOTHERAPY INDUCED PANCYTOPENIA   Status:

Acute   Current Visit: No   





(2) Neutropenic fever


SNOMED Code(s): 531330779


   Code(s): D70.9 - NEUTROPENIA, UNSPECIFIED; R50.81 - FEVER PRESENTING WITH 

CONDITIONS CLASSIFIED ELSEWHERE   Status: Acute   Current Visit: No   





(3) Diarrhea


SNOMED Code(s): 91100907


   Code(s): R19.7 - DIARRHEA, UNSPECIFIED   Status: Acute   Current Visit: No   





(4) Hypotensive episode


SNOMED Code(s): 06287291


   Code(s): I95.9 - HYPOTENSION, UNSPECIFIED   Status: Acute   Current Visit: No

  





(5) Multiple myeloma


SNOMED Code(s): 079928686


   Code(s): C90.00 - MULTIPLE MYELOMA NOT HAVING ACHIEVED REMISSION   Status: 

Acute   Current Visit: No   Onset Date: ~01/20/21   





(6) Intertrochanteric fracture, hip


SNOMED Code(s): 925804192


   Code(s): S72.143A - DISPLACED INTERTROCHANTERIC FRACTURE OF UNSP FEMUR, INIT 

 Status: Acute   Current Visit: No   Onset Date: ~01/08/21   





(7) Closed fracture of left proximal humerus


SNOMED Code(s): 57466630


   Code(s): S42.202A - UNSP FRACTURE OF UPPER END OF LEFT HUMERUS, INIT FOR CLOS

FX   Status: Acute   Current Visit: No   Onset Date: ~01/08/21   





(8) Pathological fracture due to neoplastic disease


SNOMED Code(s): 417252766


   Code(s): M84.50XA - PATHOLOGICAL FRACTURE IN NEOPLASTIC DISEASE, UNSP SITE, 

INIT   Status: Acute   Current Visit: No   


Qualifiers: 


   Site of pathological fracture: hip 





(9) Asthma, moderate persistent


SNOMED Code(s): 221437669


   Code(s): J45.40 - MODERATE PERSISTENT ASTHMA, UNCOMPLICATED   Status: Chronic

  Current Visit: No   





(10) Esophageal reflux


SNOMED Code(s): 325771983


   Code(s): K21.9 - GASTRO-ESOPHAGEAL REFLUX DISEASE WITHOUT ESOPHAGITIS   

Status: Chronic   Current Visit: No   





(11) Obesity (BMI 35.0-39.9 without comorbidity)


SNOMED Code(s): 415342339, 281505963


   Code(s): E66.9 - OBESITY, UNSPECIFIED   Status: Chronic   Current Visit: No  







(12) Sciatica


SNOMED Code(s): 23288908


   Code(s): M54.30 - SCIATICA, UNSPECIFIED SIDE   Status: Chronic   Current 

Visit: No   


Qualifiers: 


 





(13) Hypothyroidism


SNOMED Code(s): 33482143


   Code(s): E03.9 - HYPOTHYROIDISM, UNSPECIFIED   Status: Chronic   Current Vis

it: No   





- Problem List Review


Problem List Initiated/Reviewed/Updated: Yes





- My Orders


Last 24 Hours: 


My Active Orders





02/14/21 13:00


Vancomycin/Water for INJ (PEG) [Vancomycin 1 GM/200 ML Premix] 1 gm   Premix Bag

1 bag IV Q24H 





02/15/21 09:00


Sulfamethoxazole/Trimethoprim [Septra DS]   1 tab PO MoWeFr@0900 





02/15/21 09:30


Pantoprazole [ProTONIX***]   40 mg PO ACBREAKFAST 





02/15/21 11:00


Filgrastim SNDZ [Zarxio]   480 mcg SUBCUT Q24H 





02/16/21 09:00


dexAMETHasone   40 mg PO Q7D 





02/16/21 12:30


BASIC METABOLIC PANEL,BMP [CHEM] Routine 


CBC WITH AUTO DIFF [HEME] Routine 


VANCOMYCIN TROUGH [CHEM] Timed 














- Plan


Plan:: 





1. Neutropenic fever: Cefepime 2 g IV q8h, Vancomycin per pharmacy, trough 2/16 

at 1230, Zarxio 480 mg daily(formulary substitution for Neupogen) given today. 

Dr Weinstein, [facility] advised to continue daily until granulocyte 

count was up to 1000 and ANC was between 900-1000. WBC 0.8,  today. She 

also stated it would take 2-3 days for granulocytes to come up. BC no growth to 

date. UA negative. Chest x-ray report(on her swing bed chart) small pleural 

effusions, pulmonary edema(saturday). Protective isolation precautions. Repeat 

labs tomorrow. 


2. Pancytopenia: Hgb 9.5, had 2 units transfused PRBCs over weekend; platelets: 

31, transfused 1 unit platelets yesterday. Repeat CBC tomorrow with ANC.


3. Multiple myeloma: Held Revlimid until she sees Dr Reyes next week. Velcade 

weekly held til seen by Dr Reyes.


4. Left humeral/hip fracture: resume PT/OT, advised that she will still need a 

few weeks of therapy yet so once her labs are more stable then can transfer her 

back to swing bed, can finish antibiotics there.


5. DVT prophylaxis: TEDs BLE, Lovenox contraindicated due to thrombocytopenia.


6. Daughter Stephanie is been made medical power of  and their son Robert 

has been made financial power of . Stephanie contact number is 

[phone].

## 2021-02-16 NOTE — CR
INDICATION:  Worsening pain with weightbearing, status post ORIF 01/08/21, 
history of multiple myeloma, recent radiation therapy to left hip.  



LEFT FEMUR:  Five images of the left femur were obtained 02/16/21 in frontal and
lateral projections and compared with 01/08/21, preoperative images.  Interval 
ORIF is noted with long intramedullary glynn and hip pin in place with good 
position and alignment of the proximal femoral fracture fragments.  The hip pin 
and intramedullary glynn appear to be intact as are two screws fixing the glynn in 
place at the distal femur.  

The left hip joint appears to be intact.  

Moderate degenerative changes noted at the left sacroiliac joint.  



Degenerative changes are noted at the left knee joint with hypertrophic spurring
medially and laterally off the femur, medially off the tibia, and at the 
intercondylar spines.  



A new acute process is not suggested.  

MTDD

## 2021-02-16 NOTE — PCM.PN
- General Info


Date of Service: 02/16/21


Subjective Update: 





Melinda is feeling better today, though she has been having problems swallowing 

her pills, states her throat feels really dry. She is able to swallow if she 

does one at a time with some juice. Still having dry nonproductive cough, no 

wheezing, shortness of breath, chest pain. Diarrhea is becoming more formed. 

Still some edema in her legs. Questioning whether she has a cold. 





- Patient Data


Vitals - Most Recent: 


                                Last Vital Signs











Temp  97.3 F   02/16/21 03:00


 


Pulse  61   02/16/21 03:00


 


Resp  17   02/16/21 03:00


 


BP  105/53 L  02/16/21 03:00


 


Pulse Ox  99   02/16/21 03:00











Weight - Most Recent: 178 lb 2 oz


I&O - Last 24 Hours: 


                                 Intake & Output











 02/15/21 02/16/21 02/16/21





 22:59 06:59 14:59


 


Output Total 250  


 


Balance -250  











Med Orders - Current: 


                               Current Medications





Acetaminophen (Tylenol Extra Strength)  500 mg PO Q6H [facility]


   Last Admin: 02/16/21 08:07 Dose:  500 mg


   Documented by: 


Albuterol (Proventil Neb Soln)  2.5 mg NEB Q2H PRN


   PRN Reason: Dyspnea


   Last Admin: 02/15/21 01:12 Dose:  2.5 mg


   Documented by: 


Albuterol/Ipratropium (Duoneb 3.0-0.5 Mg/3 Ml)  3 ml INH 0700,1100,1500,1900 [facility]


   Last Admin: 02/16/21 06:36 Dose:  3 ml


   Documented by: 


Allopurinol (Zyloprim)  100 mg PO DAILY [facility]


   Last Admin: 02/16/21 08:24 Dose:  100 mg


   Documented by: 


Aspirin (Halfprin)  81 mg PO DAILY [facility]


   Last Admin: 02/16/21 08:08 Dose:  81 mg


   Documented by: 


Budesonide (Pulmicort)  0.5 mg NEB BID@0700,1900 [facility]


   Last Admin: 02/16/21 06:36 Dose:  0.5 mg


   Documented by: 


Calcium Carbonate/Glycine (Oyster Shell Calcium)  500 mg PO BID [facility]


   Last Admin: 02/16/21 08:24 Dose:  500 mg


   Documented by: 


Cefepime HCl (Maxipime)  2 gm IVPUSH Q8H [facility]


   Last Admin: 02/16/21 05:30 Dose:  2 gm


   Documented by: 


Diazepam (Valium)  2 mg PO TID PRN


   PRN Reason: Muscle Spasm


   Last Admin: 02/15/21 21:33 Dose:  2 mg


   Documented by: 


Ferrous Sulfate (Ferrous Sulfate)  325 mg PO DAILY [facility]


   Last Admin: 02/16/21 08:08 Dose:  325 mg


   Documented by: 


Filgrastim-Sndz (Zarxio)  480 mcg SUBCUT Q24H [facility]


   Last Admin: 02/15/21 11:25 Dose:  480 mcg


   Documented by: 


Vancomycin HCl 1 gm/ Premix  200 mls @ 200 mls/hr IV Q24H [facility]


   Last Admin: 02/15/21 15:00 Dose:  200 mls/hr


   Documented by: 


Sodium Chloride (Normal Saline)  250 mls @ 100 mls/hr IV ASDIRECTED [facility]


Ibuprofen (Motrin)  200 mg PO Q6H [facility]


   Last Admin: 02/16/21 08:07 Dose:  200 mg


   Documented by: 


Levothyroxine Sodium (Synthroid)  50 mcg PO ACBREAKFAST [facility]


   Last Admin: 02/16/21 08:07 Dose:  50 mcg


   Documented by: 


Melatonin (Melatonin)  6 mg PO BEDTIME PRN


   PRN Reason: Insomnia


   Last Admin: 02/15/21 21:33 Dose:  6 mg


   Documented by: 


Montelukast Sodium (Singulair)  10 mg PO BEDTIME [facility]


   Last Admin: 02/15/21 20:44 Dose:  10 mg


   Documented by: 


Ondansetron HCl (Zofran Odt)  4 mg PO Q6H PRN


   PRN Reason: Nausea/Vomiting


   Last Admin: 02/16/21 03:12 Dose:  4 mg


   Documented by: 


Pantoprazole Sodium (Protonix***)  40 mg PO ACBREAKFAST [facility]


   Last Admin: 02/16/21 08:07 Dose:  40 mg


   Documented by: 


Polyethylene Glycol (Miralax)  17 gm PO DAILY PRN


   PRN Reason: CONSTIPATION


Saccharomyces Boulardii (Florastor)  250 mg PO BID [facility]


   Last Admin: 02/16/21 08:24 Dose:  250 mg


   Documented by: 


Sodium Chloride (Saline Flush)  10 ml FLUSH ASDIRECTED PRN


   PRN Reason: Keep Vein Open


   Last Admin: 02/16/21 08:05 Dose:  10 ml


   Documented by: 


Tramadol HCl (Ultram)  50 mg PO DAILY@0800 [facility]


   Last Admin: 02/16/21 08:22 Dose:  50 mg


   Documented by: 


Tramadol HCl (Ultram)  50 mg PO Q6H PRN


   PRN Reason: Pain (4-6/10)


   Last Admin: 02/13/21 23:48 Dose:  50 mg


   Documented by: 


Trimethoprim/Sulfamethoxazole (Septra Ds)  1 tab PO MoWeFr@0900 [facility]


   Last Admin: 02/15/21 08:22 Dose:  1 tab


   Documented by: 


Valacyclovir HCl (Valtrex)  500 mg PO DAILY [facility]


   Last Admin: 02/16/21 08:24 Dose:  500 mg


   Documented by: 


Vancomycin HCl (Pharmacy To Dose - Vancomycin)  1 dose .XX ASDIRECTED [facility]





Discontinued Medications





Acetaminophen (Tylenol Extra Strength)  500 mg PO Q6H [facility]


   Last Admin: 02/13/21 16:00 Dose:  Not Given


   Documented by: 


Albuterol/Ipratropium (Duoneb 3.0-0.5 Mg/3 Ml)  3 ml INH QIDRT [facility]


   Last Admin: 02/13/21 16:00 Dose:  Not Given


   Documented by: 


Budesonide (Pulmicort)  0.5 mg NEB BIDRT [facility]


Dexamethasone (Dexamethasone)  40 mg PO Q7D [facility]


Furosemide (Lasix)  40 mg IVPUSH NOW STA


   Stop: 02/14/21 10:30


   Last Admin: 02/14/21 14:49 Dose:  Not Given


   Documented by: 


Furosemide (Lasix)  20 mg IVPUSH NOW ONE


   Stop: 02/14/21 12:21


   Last Admin: 02/14/21 14:22 Dose:  20 mg


   Documented by: 


Furosemide (Lasix)  20 mg IVPUSH BTNUNITS ONE


   Stop: 02/14/21 12:21


   Last Admin: 02/14/21 14:41 Dose:  20 mg


   Documented by: 


Sodium Chloride (Normal Saline)  250 mls @ 100 mls/hr IV ASDIRECTED [facility]


Sodium Chloride (Normal Saline)  1,000 mls @ 100 mls/hr IV ASDIRECTED [facility]


   Last Admin: 02/13/21 23:55 Dose:  100 mls/hr


   Documented by: 


Vancomycin HCl 2 gm/ Premix  400 mls @ 200 mls/hr IV ONETIME ONE


   Stop: 02/13/21 14:59


   Last Admin: 02/13/21 13:38 Dose:  200 mls/hr


   Documented by: 


Ibuprofen (Motrin)  200 mg PO Q6H [facility]


   Last Admin: 02/13/21 15:59 Dose:  Not Given


   Documented by: 


Revlimid 25 Mg (Capsule *Pt Own Med*)  1 each PO DAILY [facility]


   Stop: 02/22/21 09:01


   Last Admin: 02/14/21 08:39 Dose:  1 each


   Documented by: 


Pantoprazole Sodium (Protonix***)  40 mg PO DAILY PRN


   PRN Reason: Dyspepsia


Sodium Chloride (Saline Flush)  10 ml FLUSH ASDIRECTED PRN


   PRN Reason: Keep Vein Open











- Exam


General: Alert, Oriented, Cooperative, No Acute Distress


Neck: Trachea Midline


Lungs: Clear to Auscultation, Normal Respiratory Effort.  No: Crackles, Wheezing


Cardiovascular: Regular Rate, Regular Rhythm, No Murmurs


GI/Abdominal Exam: Normal Bowel Sounds, Soft, Non-Tender, No Distention


 (Female) Exam: Deferred


Extremities: Pedal Edema (trace, BLE)


Peripheral Pulses: 2+: Radial (L), Radial (R)





Sepsis Event Note





- Evaluation


Sepsis Screening Result: No Definite Risk





- Focused Exam


Vital Signs: 


                                   Vital Signs











  Temp Temp Pulse Resp BP Pulse Ox


 


 02/16/21 03:00   97.3 F  61  17  105/53 L  99


 


 02/16/21 02:00  97.3 F     


 


 02/15/21 23:00   97.6 F  72  18  93/51 L  100














- Problem List & Annotations


(1) Neutropenic fever


SNOMED Code(s): 256693271


   Code(s): D70.9 - NEUTROPENIA, UNSPECIFIED; R50.81 - FEVER PRESENTING WITH 

CONDITIONS CLASSIFIED ELSEWHERE   Status: Acute   Current Visit: No   





(2) Pancytopenia due to chemotherapy


SNOMED Code(s): 3487128, 952778833


   Code(s): D61.810 - ANTINEOPLASTIC CHEMOTHERAPY INDUCED PANCYTOPENIA   Status:

Acute   Current Visit: No   





(3) Diarrhea


SNOMED Code(s): 13156212


   Code(s): R19.7 - DIARRHEA, UNSPECIFIED   Status: Acute   Current Visit: No   





(4) Hypotensive episode


SNOMED Code(s): 23477333


   Code(s): I95.9 - HYPOTENSION, UNSPECIFIED   Status: Acute   Current Visit: No

  





(5) Multiple myeloma


SNOMED Code(s): 744361685


   Code(s): C90.00 - MULTIPLE MYELOMA NOT HAVING ACHIEVED REMISSION   Status: 

Acute   Current Visit: No   Onset Date: ~01/20/21   





(6) Intertrochanteric fracture, hip


SNOMED Code(s): 599566892


   Code(s): S72.143A - DISPLACED INTERTROCHANTERIC FRACTURE OF UNSP FEMUR, INIT 

 Status: Acute   Current Visit: No   Onset Date: ~01/08/21   





(7) Closed fracture of left proximal humerus


SNOMED Code(s): 68272412


   Code(s): S42.202A - UNSP FRACTURE OF UPPER END OF LEFT HUMERUS, INIT FOR CLOS

FX   Status: Acute   Current Visit: No   Onset Date: ~01/08/21   





(8) Pathological fracture due to neoplastic disease


SNOMED Code(s): 717658684


   Code(s): M84.50XA - PATHOLOGICAL FRACTURE IN NEOPLASTIC DISEASE, UNSP SITE, 

INIT   Status: Acute   Current Visit: No   


Qualifiers: 


   Site of pathological fracture: hip 





(9) Asthma, moderate persistent


SNOMED Code(s): 760402622


   Code(s): J45.40 - MODERATE PERSISTENT ASTHMA, UNCOMPLICATED   Status: Chronic

  Current Visit: No   





(10) Esophageal reflux


SNOMED Code(s): 167338153


   Code(s): K21.9 - GASTRO-ESOPHAGEAL REFLUX DISEASE WITHOUT ESOPHAGITIS   

Status: Chronic   Current Visit: No   





(11) Obesity (BMI 35.0-39.9 without comorbidity)


SNOMED Code(s): 726291474, 512054056


   Code(s): E66.9 - OBESITY, UNSPECIFIED   Status: Chronic   Current Visit: No  







(12) Sciatica


SNOMED Code(s): 19239955


   Code(s): M54.30 - SCIATICA, UNSPECIFIED SIDE   Status: Chronic   Current 

Visit: No   


Qualifiers: 


 





(13) Hypothyroidism


SNOMED Code(s): 31343358


   Code(s): E03.9 - HYPOTHYROIDISM, UNSPECIFIED   Status: Chronic   Current 

Visit: No   





- Problem List Review


Problem List Initiated/Reviewed/Updated: Yes





- My Orders


Last 24 Hours: 


My Active Orders





02/15/21 09:00


Sulfamethoxazole/Trimethoprim [Septra DS]   1 tab PO MoWeFr@0900 





02/15/21 09:30


Pantoprazole [ProTONIX***]   40 mg PO ACBREAKFAST 





02/15/21 11:00


Filgrastim SNDZ [Zarxio]   480 mcg SUBCUT Q24H 





02/16/21 12:30


BASIC METABOLIC PANEL,BMP [CHEM] Routine 


CBC WITH AUTO DIFF [HEME] Routine 


VANCOMYCIN TROUGH [CHEM] Routine 














- Plan


Plan:: 





1. Neutropenic fever: Cefepime 2 g IV q8h, Vancomycin per pharmacy, trough 2/16 

at 1230, Zarxio 480 mg daily(formulary substitution for Neupogen) given today. 

Dr Weinstein, [facility] advised to continue daily until granulocyte 

count was up to 1000 and ANC was between 900-1000. Labs ordered for 1230 along 

with vanco trough. Protective isolation precautions. Repeat labs tomorrow. 


2. Pancytopenia: Hgb 9.5 yesterday labs at 1230, had 2 units transfused PRBCs 

over weekend; platelets: 31 yesterday, lab at 1230, transfused 1 unit platelets 

yesterday. Repeat CBC tomorrow with ANC.


3. Multiple myeloma: Held Revlimid until she sees Dr Reyes next week. Velcade &

 Dexamethasone weekly held til seen by Dr Reyes.


4. Left humeral/hip fracture: resume PT/OT, advised that she will still need a 

few weeks of therapy yet so once her labs are more stable then can transfer her 

back to swing bed, can finish antibiotics there.


5. DVT prophylaxis: TEDs BLE, Lovenox contraindicated due to thrombocytopenia.


6. Daughter Stephanie is been made medical power of  and their son Robert 

has been made financial power of . Stephanie contact number is 

[phone].

## 2021-02-17 NOTE — PCM.PN
- General Info


Date of Service: 02/17/21


Subjective Update: 





Melinda is feeling better today, had worsening pain in left hip with weight 

bearing yesterday during therapy so had obtained x-rays of left hip. Showed 

hardware in place without failure. Most likely secondary to radiation treatment 

to that area. She had productive cough yesterday, feels her breathing is better.

No nausea or vomiting. Trace edema in her legs. Feels a little stronger. Blood 

pressure improved. 





- Patient Data


Vitals - Most Recent: 


                                Last Vital Signs











Temp  98.4 F   02/17/21 02:29


 


Pulse  76   02/16/21 23:58


 


Resp  18   02/16/21 23:58


 


BP  100/56 L  02/16/21 23:58


 


Pulse Ox  97   02/16/21 23:58











Weight - Most Recent: 178 lb 4 oz


I&O - Last 24 Hours: 


                                 Intake & Output











 02/16/21 02/17/21 02/17/21





 22:59 06:59 14:59


 


Intake Total 490 240 


 


Output Total 500 400 


 


Balance -10 -160 











Lab Results Last 24 Hours: 


                         Laboratory Results - last 24 hr











  02/16/21 02/16/21 02/17/21 Range/Units





  12:30 12:30 06:35 


 


WBC   2.4 L  3.3  (3.0-10.3)  x10-3/uL


 


RBC   3.05 L  2.94 L  (3.60-5.20)  x10(6)uL


 


Hgb   9.2 L  9.0 L  (11.4-15.5)  g/dL


 


Hct   28.7 L  27.6 L  (34.2-48.2)  %


 


MCV   94.0  94.1  (76.7-100.5)  fL


 


MCH   30.2  30.6  (23.9-33.9)  pg


 


MCHC   32.2  32.5  (31.9-34.8)  g/dL


 


RDW   16.4  16.6 H  (12.3-16.5)  %


 


Plt Count   18 L*  41 L  (151-488)  x10(3)uL


 


MPV   9.9  9.9  (7.1-12.4)  fL


 


Neut % (Auto)   75.2   (30.8-76.2)  %


 


Lymph % (Auto)   7.0 L   (18.4-52.1)  %


 


Mono % (Auto)   14.1   (4.4-15.7)  %


 


Eos % (Auto)   3.6   (0.6-8.1)  %


 


Baso % (Auto)   0.1 L   (0.2-1.5)  %


 


Neut # (Auto)   1.8   (1.5-6.3)  x10-3/uL


 


Lymph # (Auto)   0.2 L   (1.0-4.4)  x10-3/uL


 


Mono # (Auto)   0.3   (0.3-1.0)  x10-3/uL


 


Eos # (Auto)   0.1   (0.0-0.8)  x10-3/uL


 


Baso # (Auto)   0.0   (0.0-0.1)  x10-3/uL


 


Add Manual Diff    Yes  


 


Neutrophils % (Manual)    60  (46-82)  %


 


Band Neutrophils %    8 H  (0-6)  %


 


Lymphocytes % (Manual)    11 L  (13-37)  %


 


Monocytes % (Manual)    16 H  (4-12)  %


 


Eosinophils % (Manual)    4  (0-5)  %


 


Metamyelocytes %    1 H  (0-0)  %


 


Differential Comment    See note  


 


Poikilocytosis    Few  


 


Anisocytosis    Few  


 


Sodium  140    (135-145)  mmol/L


 


Potassium  3.6    (3.5-5.3)  mmol/L


 


Chloride  107    (100-110)  mmol/L


 


Carbon Dioxide  21    (21-32)  mmol/L


 


BUN  25 H    (7-18)  mg/dL


 


Creatinine  1.0    (0.55-1.02)  mg/dL


 


Est Cr Clr Drug Dosing  35.99    mL/min


 


Estimated GFR (MDRD)  54 L    (>60)  


 


BUN/Creatinine Ratio  25.0 H    (9-20)  


 


Glucose  97    ()  mg/dL


 


Calcium  9.4    (8.6-10.2)  mg/dL


 


Vancomycin Trough  12.5 H    (<0.8)  ug/mL














  02/17/21 Range/Units





  06:35 


 


WBC   (3.0-10.3)  x10-3/uL


 


RBC   (3.60-5.20)  x10(6)uL


 


Hgb   (11.4-15.5)  g/dL


 


Hct   (34.2-48.2)  %


 


MCV   (76.7-100.5)  fL


 


MCH   (23.9-33.9)  pg


 


MCHC   (31.9-34.8)  g/dL


 


RDW   (12.3-16.5)  %


 


Plt Count   (151-488)  x10(3)uL


 


MPV   (7.1-12.4)  fL


 


Neut % (Auto)   (30.8-76.2)  %


 


Lymph % (Auto)   (18.4-52.1)  %


 


Mono % (Auto)   (4.4-15.7)  %


 


Eos % (Auto)   (0.6-8.1)  %


 


Baso % (Auto)   (0.2-1.5)  %


 


Neut # (Auto)   (1.5-6.3)  x10-3/uL


 


Lymph # (Auto)   (1.0-4.4)  x10-3/uL


 


Mono # (Auto)   (0.3-1.0)  x10-3/uL


 


Eos # (Auto)   (0.0-0.8)  x10-3/uL


 


Baso # (Auto)   (0.0-0.1)  x10-3/uL


 


Add Manual Diff   


 


Neutrophils % (Manual)   (46-82)  %


 


Band Neutrophils %   (0-6)  %


 


Lymphocytes % (Manual)   (13-37)  %


 


Monocytes % (Manual)   (4-12)  %


 


Eosinophils % (Manual)   (0-5)  %


 


Metamyelocytes %   (0-0)  %


 


Differential Comment   


 


Poikilocytosis   


 


Anisocytosis   


 


Sodium  142  (135-145)  mmol/L


 


Potassium  3.8  (3.5-5.3)  mmol/L


 


Chloride  110  (100-110)  mmol/L


 


Carbon Dioxide  22  (21-32)  mmol/L


 


BUN  25 H  (7-18)  mg/dL


 


Creatinine  0.9  (0.55-1.02)  mg/dL


 


Est Cr Clr Drug Dosing  39.99  mL/min


 


Estimated GFR (MDRD)  > 60  (>60)  


 


BUN/Creatinine Ratio  27.8 H  (9-20)  


 


Glucose  89  ()  mg/dL


 


Calcium  9.3  (8.6-10.2)  mg/dL


 


Vancomycin Trough   (<0.8)  ug/mL











Med Orders - Current: 


                               Current Medications





Acetaminophen (Tylenol Extra Strength)  500 mg PO Q6H [facility]


   Last Admin: 02/17/21 08:50 Dose:  500 mg


   Documented by: 


Albuterol (Proventil Neb Soln)  2.5 mg NEB Q2H PRN


   PRN Reason: Dyspnea


   Last Admin: 02/15/21 01:12 Dose:  2.5 mg


   Documented by: 


Albuterol/Ipratropium (Duoneb 3.0-0.5 Mg/3 Ml)  3 ml INH 0700,1100,1500,1900 [facility]


   Last Admin: 02/17/21 06:10 Dose:  3 ml


   Documented by: 


Allopurinol (Zyloprim)  100 mg PO DAILY [facility]


   Last Admin: 02/17/21 08:51 Dose:  100 mg


   Documented by: 


Aspirin (Halfprin)  81 mg PO DAILY [facility]


   Last Admin: 02/17/21 08:54 Dose:  81 mg


   Documented by: 


Budesonide (Pulmicort)  0.5 mg NEB BID@0700,1900 [facility]


   Last Admin: 02/17/21 06:10 Dose:  0.5 mg


   Documented by: 


Calcium Carbonate/Glycine (Oyster Shell Calcium)  500 mg PO BID [facility]


   Last Admin: 02/17/21 08:52 Dose:  500 mg


   Documented by: 


Cefepime HCl (Maxipime)  2 gm IVPUSH Q12H [facility]


Diazepam (Valium)  2 mg PO TID PRN


   PRN Reason: Muscle Spasm


   Last Admin: 02/16/21 21:30 Dose:  2 mg


   Documented by: 


Ferrous Sulfate (Ferrous Sulfate)  325 mg PO DAILY [facility]


   Last Admin: 02/17/21 08:50 Dose:  325 mg


   Documented by: 


Sodium Chloride (Normal Saline)  250 mls @ 100 mls/hr IV ASDIRECTED [facility]


Vancomycin HCl 1.25 gm/ Sodium (Chloride)  250 mls @ 200 mls/hr IV Q24H [facility]


   Last Admin: 02/16/21 13:29 Dose:  200 mls/hr


   Documented by: 


Sodium Chloride (Normal Saline)  250 mls @ 100 mls/hr IV ASDIRECTED [facility]


Ibuprofen (Motrin)  200 mg PO Q6H [facility]


   Last Admin: 02/17/21 08:50 Dose:  200 mg


   Documented by: 


Levothyroxine Sodium (Synthroid)  50 mcg PO ACBREAKFAST [facility]


   Last Admin: 02/17/21 08:49 Dose:  50 mcg


   Documented by: 


Melatonin (Melatonin)  6 mg PO BEDTIME PRN


   PRN Reason: Insomnia


   Last Admin: 02/16/21 21:30 Dose:  6 mg


   Documented by: 


Montelukast Sodium (Singulair)  10 mg PO BEDTIME [facility]


   Last Admin: 02/16/21 21:29 Dose:  10 mg


   Documented by: 


Ondansetron HCl (Zofran Odt)  4 mg PO Q6H PRN


   PRN Reason: Nausea/Vomiting


   Last Admin: 02/16/21 10:21 Dose:  4 mg


   Documented by: 


Pantoprazole Sodium (Protonix***)  40 mg PO ACBREAKFAST [facility]


   Last Admin: 02/17/21 08:50 Dose:  40 mg


   Documented by: 


Polyethylene Glycol (Miralax)  17 gm PO DAILY PRN


   PRN Reason: CONSTIPATION


Saccharomyces Boulardii (Florastor)  250 mg PO BID [facility]


   Last Admin: 02/17/21 08:51 Dose:  250 mg


   Documented by: 


Sodium Chloride (Saline Flush)  10 ml FLUSH ASDIRECTED PRN


   PRN Reason: Keep Vein Open


   Last Admin: 02/16/21 13:30 Dose:  10 ml


   Documented by: 


Tramadol HCl (Ultram)  50 mg PO DAILY@0800 [facility]


   Last Admin: 02/17/21 09:07 Dose:  50 mg


   Documented by: 


Tramadol HCl (Ultram)  50 mg PO Q6H PRN


   PRN Reason: Pain (4-6/10)


   Last Admin: 02/16/21 21:32 Dose:  50 mg


   Documented by: 


Trimethoprim/Sulfamethoxazole (Septra Ds)  1 tab PO MoWeFr@0900 [facility]


   Last Admin: 02/15/21 08:22 Dose:  1 tab


   Documented by: 


Valacyclovir HCl (Valtrex)  500 mg PO DAILY [facility]


   Last Admin: 02/17/21 08:51 Dose:  500 mg


   Documented by: 


Vancomycin HCl (Pharmacy To Dose - Vancomycin)  1 dose .XX ASDIRECTED [facility]





Discontinued Medications





Acetaminophen (Tylenol Extra Strength)  500 mg PO Q6H [facility]


   Last Admin: 02/13/21 16:00 Dose:  Not Given


   Documented by: 


Albuterol/Ipratropium (Duoneb 3.0-0.5 Mg/3 Ml)  3 ml INH QIDRT [facility]


   Last Admin: 02/13/21 16:00 Dose:  Not Given


   Documented by: 


Budesonide (Pulmicort)  0.5 mg NEB BIDRT [facility]


Cefepime HCl (Maxipime)  2 gm IVPUSH Q8H [facility]


   Last Admin: 02/17/21 05:30 Dose:  2 gm


   Documented by: 


Dexamethasone (Dexamethasone)  40 mg PO Q7D [facility]


Filgrastim-Sndz (Zarxio)  480 mcg SUBCUT Q24H [facility]


   Last Admin: 02/16/21 11:46 Dose:  480 mcg


   Documented by: 


Furosemide (Lasix)  40 mg IVPUSH NOW STA


   Stop: 02/14/21 10:30


   Last Admin: 02/14/21 14:49 Dose:  Not Given


   Documented by: 


Furosemide (Lasix)  20 mg IVPUSH NOW ONE


   Stop: 02/14/21 12:21


   Last Admin: 02/14/21 14:22 Dose:  20 mg


   Documented by: 


Furosemide (Lasix)  20 mg IVPUSH BTNUNITS ONE


   Stop: 02/14/21 12:21


   Last Admin: 02/14/21 14:41 Dose:  20 mg


   Documented by: 


Sodium Chloride (Normal Saline)  250 mls @ 100 mls/hr IV ASDIRECTED [facility]


Sodium Chloride (Normal Saline)  1,000 mls @ 100 mls/hr IV ASDIRECTED [facility]


   Last Admin: 02/13/21 23:55 Dose:  100 mls/hr


   Documented by: 


Vancomycin HCl 2 gm/ Premix  400 mls @ 200 mls/hr IV ONETIME ONE


   Stop: 02/13/21 14:59


   Last Admin: 02/13/21 13:38 Dose:  200 mls/hr


   Documented by: 


Vancomycin HCl 1 gm/ Premix  200 mls @ 200 mls/hr IV Q24H [facility]


   Last Admin: 02/15/21 15:00 Dose:  200 mls/hr


   Documented by: 


Ibuprofen (Motrin)  200 mg PO Q6H [facility]


   Last Admin: 02/13/21 15:59 Dose:  Not Given


   Documented by: 


Revlimid 25 Mg (Capsule *Pt Own Med*)  1 each PO DAILY [facility]


   Stop: 02/22/21 09:01


   Last Admin: 02/14/21 08:39 Dose:  1 each


   Documented by: 


Pantoprazole Sodium (Protonix***)  40 mg PO DAILY PRN


   PRN Reason: Dyspepsia


Sodium Chloride (Saline Flush)  10 ml FLUSH ASDIRECTED PRN


   PRN Reason: Keep Vein Open











- Exam


General: Alert, Oriented, Cooperative, No Acute Distress


Lungs: Clear to Auscultation, Normal Respiratory Effort


Cardiovascular: Regular Rate, Regular Rhythm


GI/Abdominal Exam: Normal Bowel Sounds, Soft, Non-Tender, No Distention


Extremities: Pedal Edema (trace)


Peripheral Pulses: 2+: Radial (L), Radial (R)





Sepsis Event Note





- Evaluation


Sepsis Screening Result: No Definite Risk





- Focused Exam


Vital Signs: 


                                   Vital Signs











  Temp Temp Pulse Resp BP Pulse Ox


 


 02/17/21 02:29  98.4 F     


 


 02/16/21 23:58   98.6 F  76  18  100/56 L  97














- Problem List & Annotations


(1) Neutropenic fever


SNOMED Code(s): 402892725


   Code(s): D70.9 - NEUTROPENIA, UNSPECIFIED; R50.81 - FEVER PRESENTING WITH 

CONDITIONS CLASSIFIED ELSEWHERE   Status: Acute   Current Visit: No   





(2) Pancytopenia due to chemotherapy


SNOMED Code(s): 6462191, 431189605


   Code(s): D61.810 - ANTINEOPLASTIC CHEMOTHERAPY INDUCED PANCYTOPENIA   Status:

Acute   Current Visit: No   





(3) Diarrhea


SNOMED Code(s): 82535397


   Code(s): R19.7 - DIARRHEA, UNSPECIFIED   Status: Acute   Current Visit: No   





(4) Hypotensive episode


SNOMED Code(s): 83936126


   Code(s): I95.9 - HYPOTENSION, UNSPECIFIED   Status: Acute   Current Visit: No

  





(5) Multiple myeloma


SNOMED Code(s): 411607577


   Code(s): C90.00 - MULTIPLE MYELOMA NOT HAVING ACHIEVED REMISSION   Status: 

Acute   Current Visit: No   Onset Date: ~01/20/21   





(6) Intertrochanteric fracture, hip


SNOMED Code(s): 911931715


   Code(s): S72.143A - DISPLACED INTERTROCHANTERIC FRACTURE OF UNSP FEMUR, INIT 

 Status: Acute   Current Visit: No   Onset Date: ~01/08/21   





(7) Closed fracture of left proximal humerus


SNOMED Code(s): 93701762


   Code(s): S42.202A - UNSP FRACTURE OF UPPER END OF LEFT HUMERUS, INIT FOR CLOS

FX   Status: Acute   Current Visit: No   Onset Date: ~01/08/21   





(8) Pathological fracture due to neoplastic disease


SNOMED Code(s): 800792548


   Code(s): M84.50XA - PATHOLOGICAL FRACTURE IN NEOPLASTIC DISEASE, UNSP SITE, 

INIT   Status: Acute   Current Visit: No   


Qualifiers: 


   Site of pathological fracture: hip 





(9) Asthma, moderate persistent


SNOMED Code(s): 798073347


   Code(s): J45.40 - MODERATE PERSISTENT ASTHMA, UNCOMPLICATED   Status: Chronic

  Current Visit: No   





(10) Esophageal reflux


SNOMED Code(s): 888303023


   Code(s): K21.9 - GASTRO-ESOPHAGEAL REFLUX DISEASE WITHOUT ESOPHAGITIS   

Status: Chronic   Current Visit: No   





(11) Obesity (BMI 35.0-39.9 without comorbidity)


SNOMED Code(s): 817839264, 964229046


   Code(s): E66.9 - OBESITY, UNSPECIFIED   Status: Chronic   Current Visit: No  







(12) Sciatica


SNOMED Code(s): 58442791


   Code(s): M54.30 - SCIATICA, UNSPECIFIED SIDE   Status: Chronic   Current 

Visit: No   


Qualifiers: 


 





(13) Hypothyroidism


SNOMED Code(s): 16042158


   Code(s): E03.9 - HYPOTHYROIDISM, UNSPECIFIED   Status: Chronic   Current 

Visit: No   





- Problem List Review


Problem List Initiated/Reviewed/Updated: Yes





- My Orders


Last 24 Hours: 


My Active Orders





02/16/21 13:03


Transfuse Platelets [COMM] Routine 





02/16/21 13:15


Sodium Chloride 0.9% [Normal Saline] 250 ml IV ASDIRECTED 





02/16/21 13:30


Vancomycin 1.25 gm   Sodium Chloride 0.9% [Normal Saline (AdvBag)] 250 ml IV 

Q24H 





02/17/21 17:00


Cefepime [Maxipime]   2 gm IVPUSH Q12H 





02/18/21 06:00


BASIC METABOLIC PANEL,BMP [CHEM] Routine 


CBC WITH AUTO DIFF [HEME] Routine 





02/19/21 12:30


VANCOMYCIN TROUGH [CHEM] Timed 














- Plan


Plan:: 





1. Neutropenic fever: Cefepime 2 g IV q12h adjusted for renal function, 

Vancomycin per pharmacy, trough 2/19 at 1230, Zarxio 480 mg daily received 2 

doses, WBC up to 3.3 and ANC 2300. Labs tomorrow am. Protective isolation 

precautions at least for another 24 hours. 


2. Pancytopenia: Hgb 9.0 had 2 units transfused PRBCs over weekend; platelets: 

41 yesterday, transfused 2 unit platelets Sunday and yesterday. Repeat CBC 

tomorrow with ANC.


3. Multiple myeloma: Held Revlimid until she sees Dr Reyes next week. Velcade &

 Dexamethasone weekly held til seen by Dr Reyes.


4. Left humeral/hip fracture: resume PT/OT, X-ray showed hardware in good 

position, no new fractures.


5. DVT prophylaxis: TEDs BLE, Lovenox contraindicated due to thrombocytopenia.


6. Daughter Stephanie is been made medical power of  and their son Robert 

has been made financial power of . Stephanie contact number is 

[phone].

## 2021-02-18 NOTE — PCM.PN
- General Info


Date of Service: 02/18/21


Subjective Update: 





She is feeling better, throat feels better since starting Magic Mouthwash with 

Nystatin, not as sore. No wheezing. Uses oxygen at night for comfort. No 

diarrhea, no constipation. No bruising or bleeding. 





- Patient Data


Vitals - Most Recent: 


                                Last Vital Signs











Temp  98.2 F   02/18/21 11:32


 


Pulse  70   02/18/21 11:32


 


Resp  20   02/18/21 08:00


 


BP  106/64   02/18/21 11:32


 


Pulse Ox  98   02/18/21 11:32











Weight - Most Recent: 179 lb 9.6 oz


I&O - Last 24 Hours: 


                                 Intake & Output











 02/17/21 02/18/21 02/18/21





 22:59 06:59 14:59


 


Intake Total 900 300 


 


Output Total 500 400 


 


Balance 400 -100 











Lab Results Last 24 Hours: 


                         Laboratory Results - last 24 hr











  02/18/21 02/18/21 Range/Units





  06:15 06:15 


 


WBC  5.7   (3.0-10.3)  x10-3/uL


 


RBC  3.00 L   (3.60-5.20)  x10(6)uL


 


Hgb  9.1 L   (11.4-15.5)  g/dL


 


Hct  27.6 L   (34.2-48.2)  %


 


MCV  91.9   (76.7-100.5)  fL


 


MCH  30.3   (23.9-33.9)  pg


 


MCHC  33.0   (31.9-34.8)  g/dL


 


RDW  16.0   (12.3-16.5)  %


 


Plt Count  35 L*   (151-488)  x10(3)uL


 


MPV  9.5   (7.1-12.4)  fL


 


Add Manual Diff  Yes   


 


Neutrophils % (Manual)  69   (46-82)  %


 


Band Neutrophils %  8 H   (0-6)  %


 


Lymphocytes % (Manual)  5 L   (13-37)  %


 


Monocytes % (Manual)  10   (4-12)  %


 


Eosinophils % (Manual)  4   (0-5)  %


 


Metamyelocytes %  2 H   (0-0)  %


 


Myelocytes %  2 H   (0-0)  %


 


Differential Comment  See note   


 


Poikilocytosis  Few   


 


Anisocytosis  Few   


 


Rouleaux  Few   


 


Sodium   142  (135-145)  mmol/L


 


Potassium   3.7  (3.5-5.3)  mmol/L


 


Chloride   109  (100-110)  mmol/L


 


Carbon Dioxide   22  (21-32)  mmol/L


 


BUN   21 H  (7-18)  mg/dL


 


Creatinine   0.9  (0.55-1.02)  mg/dL


 


Est Cr Clr Drug Dosing   39.99  mL/min


 


Estimated GFR (MDRD)   > 60  (>60)  


 


BUN/Creatinine Ratio   23.3 H  (9-20)  


 


Glucose   88  ()  mg/dL


 


Calcium   8.6  (8.6-10.2)  mg/dL











Med Orders - Current: 


                               Current Medications





Acetaminophen (Tylenol Extra Strength)  500 mg PO Q6H [facility]


   Last Admin: 02/18/21 09:45 Dose:  500 mg


   Documented by: 


Albuterol (Proventil Neb Soln)  2.5 mg NEB Q2H PRN


   PRN Reason: Dyspnea


   Last Admin: 02/15/21 01:12 Dose:  2.5 mg


   Documented by: 


Albuterol/Ipratropium (Duoneb 3.0-0.5 Mg/3 Ml)  3 ml INH 0700,1100,1500,1900 [facility]


   Last Admin: 02/18/21 11:30 Dose:  3 ml


   Documented by: 


Allopurinol (Zyloprim)  100 mg PO DAILY [facility]


   Last Admin: 02/18/21 09:46 Dose:  100 mg


   Documented by: 


Aspirin (Halfprin)  81 mg PO DAILY [facility]


   Last Admin: 02/18/21 09:47 Dose:  81 mg


   Documented by: 


Budesonide (Pulmicort)  0.5 mg NEB BID@0700,1900 [facility]


   Last Admin: 02/18/21 06:08 Dose:  0.5 mg


   Documented by: 


Calcium Carbonate/Glycine (Oyster Shell Calcium)  500 mg PO BID [facility]


   Last Admin: 02/18/21 09:46 Dose:  500 mg


   Documented by: 


Cefepime HCl (Maxipime)  2 gm IVPUSH Q12H [facility]


   Last Admin: 02/18/21 04:29 Dose:  2 gm


   Documented by: 


Al Hydroxide/Mg Hydroxide 30 ml/ Lidocaine HCl 30 ml/Diphenhydramine HCl 75 

mg/Nystatin 30 ml  0 ml PO QID [facility]


   Last Admin: 02/18/21 09:02 Dose:  5 ml


   Documented by: 


Diazepam (Valium)  2 mg PO TID PRN


   PRN Reason: Muscle Spasm


   Last Admin: 02/16/21 21:30 Dose:  2 mg


   Documented by: 


Ferrous Sulfate (Ferrous Sulfate)  325 mg PO DAILY [facility]


   Last Admin: 02/18/21 09:47 Dose:  325 mg


   Documented by: 


Sodium Chloride (Normal Saline)  250 mls @ 100 mls/hr IV ASDIRECTED [facility]IL[facility]


Vancomycin HCl 1.25 gm/ Sodium (Chloride)  250 mls @ 200 mls/hr IV Q24H [facility]


   Last Admin: 02/17/21 13:53 Dose:  200 mls/hr


   Documented by: 


Sodium Chloride (Normal Saline)  250 mls @ 100 mls/hr IV ASDIRECTED [facility]


Ibuprofen (Motrin)  200 mg PO Q6H [facility]


   Last Admin: 02/18/21 09:45 Dose:  200 mg


   Documented by: 


Levothyroxine Sodium (Synthroid)  50 mcg PO ACBREAKFAST [facility]


   Last Admin: 02/18/21 06:30 Dose:  50 mcg


   Documented by: 


Melatonin (Melatonin)  6 mg PO BEDTIME PRN


   PRN Reason: Insomnia


   Last Admin: 02/16/21 21:30 Dose:  6 mg


   Documented by: 


Montelukast Sodium (Singulair)  10 mg PO BEDTIME [facility]


   Last Admin: 02/17/21 20:51 Dose:  10 mg


   Documented by: 


Ondansetron HCl (Zofran Odt)  4 mg PO Q6H PRN


   PRN Reason: Nausea/Vomiting


   Last Admin: 02/16/21 10:21 Dose:  4 mg


   Documented by: 


Pantoprazole Sodium (Protonix***)  40 mg PO ACBREAKFAST [facility]


   Last Admin: 02/18/21 06:30 Dose:  40 mg


   Documented by: 


Polyethylene Glycol (Miralax)  17 gm PO DAILY PRN


   PRN Reason: CONSTIPATION


Saccharomyces Boulardii (Florastor)  250 mg PO BID [facility]


   Last Admin: 02/18/21 09:47 Dose:  250 mg


   Documented by: 


Sodium Chloride (Saline Flush)  10 ml FLUSH ASDIRECTED PRN


   PRN Reason: Keep Vein Open


   Last Admin: 02/18/21 04:35 Dose:  10 ml


   Documented by: 


Tramadol HCl (Ultram)  50 mg PO DAILY@0800 [facility]


   Last Admin: 02/18/21 09:55 Dose:  50 mg


   Documented by: 


Tramadol HCl (Ultram)  50 mg PO Q6H PRN


   PRN Reason: Pain (4-6/10)


   Last Admin: 02/16/21 21:32 Dose:  50 mg


   Documented by: 


Trimethoprim/Sulfamethoxazole (Septra Ds)  1 tab PO MoWeFr@0900 [facility]


   Last Admin: 02/17/21 10:00 Dose:  1 tab


   Documented by: 


Valacyclovir HCl (Valtrex)  500 mg PO DAILY [facility]


   Last Admin: 02/18/21 09:48 Dose:  500 mg


   Documented by: 


Vancomycin HCl (Pharmacy To Dose - Vancomycin)  1 dose .XX ASDIRECTED [facility]





Discontinued Medications





Acetaminophen (Tylenol Extra Strength)  500 mg PO Q6H [facility]


   Last Admin: 02/13/21 16:00 Dose:  Not Given


   Documented by: 


Albuterol/Ipratropium (Duoneb 3.0-0.5 Mg/3 Ml)  3 ml INH QIDRT [facility]


   Last Admin: 02/13/21 16:00 Dose:  Not Given


   Documented by: 


Budesonide (Pulmicort)  0.5 mg NEB BIDRT [facility]


Cefepime HCl (Maxipime)  2 gm IVPUSH Q8H [facility]


   Last Admin: 02/17/21 05:30 Dose:  2 gm


   Documented by: 


Dexamethasone (Dexamethasone)  40 mg PO Q7D [facility]


Filgrastim-Sndz (Zarxio)  480 mcg SUBCUT Q24H [facility]


   Last Admin: 02/16/21 11:46 Dose:  480 mcg


   Documented by: 


Furosemide (Lasix)  40 mg IVPUSH NOW STA


   Stop: 02/14/21 10:30


   Last Admin: 02/14/21 14:49 Dose:  Not Given


   Documented by: 


Furosemide (Lasix)  20 mg IVPUSH NOW ONE


   Stop: 02/14/21 12:21


   Last Admin: 02/14/21 14:22 Dose:  20 mg


   Documented by: 


Furosemide (Lasix)  20 mg IVPUSH BTNUNITS ONE


   Stop: 02/14/21 12:21


   Last Admin: 02/14/21 14:41 Dose:  20 mg


   Documented by: 


Sodium Chloride (Normal Saline)  250 mls @ 100 mls/hr IV ASDIRECTED [facility]


Sodium Chloride (Normal Saline)  1,000 mls @ 100 mls/hr IV ASDIRECTED [facility]


   Last Admin: 02/13/21 23:55 Dose:  100 mls/hr


   Documented by: 


Vancomycin HCl 2 gm/ Premix  400 mls @ 200 mls/hr IV ONETIME ONE


   Stop: 02/13/21 14:59


   Last Admin: 02/13/21 13:38 Dose:  200 mls/hr


   Documented by: 


Vancomycin HCl 1 gm/ Premix  200 mls @ 200 mls/hr IV Q24H [facility]


   Last Admin: 02/15/21 15:00 Dose:  200 mls/hr


   Documented by: 


Ibuprofen (Motrin)  200 mg PO Q6H [facility]


   Last Admin: 02/13/21 15:59 Dose:  Not Given


   Documented by: 


Revlimid 25 Mg (Capsule *Pt Own Med*)  1 each PO DAILY [facility]


   Stop: 02/22/21 09:01


   Last Admin: 02/14/21 08:39 Dose:  1 each


   Documented by: 


Pantoprazole Sodium (Protonix***)  40 mg PO DAILY PRN


   PRN Reason: Dyspepsia


Sodium Chloride (Saline Flush)  10 ml FLUSH ASDIRECTED PRN


   PRN Reason: Keep Vein Open











- Exam


General: Alert, Oriented, Cooperative, No Acute Distress


HEENT: Other (White plaque on tongue, posterior pharynx: erythematous)


Neck: No: Lymphadenopathy


Lungs: Clear to Auscultation, Normal Respiratory Effort


Cardiovascular: Regular Rate, Regular Rhythm


GI/Abdominal Exam: Normal Bowel Sounds, Soft, Non-Tender, No Distention


Extremities: Pedal Edema (1+ BLE)


Peripheral Pulses: 2+: Radial (L), Radial (R)





Sepsis Event Note





- Evaluation


Sepsis Screening Result: No Definite Risk





- Focused Exam


Vital Signs: 


                                   Vital Signs











  Temp Pulse Resp BP Pulse Ox


 


 02/18/21 11:32  98.2 F  70   106/64  98


 


 02/18/21 08:00  98.0 F  72  20  110/60  97














- Problem List & Annotations


(1) Neutropenic fever


SNOMED Code(s): 753528613


   Code(s): D70.9 - NEUTROPENIA, UNSPECIFIED; R50.81 - FEVER PRESENTING WITH 

CONDITIONS CLASSIFIED ELSEWHERE   Status: Acute   Current Visit: No   





(2) Pancytopenia due to chemotherapy


SNOMED Code(s): 8129125, 231247738


   Code(s): D61.810 - ANTINEOPLASTIC CHEMOTHERAPY INDUCED PANCYTOPENIA   Status:

Acute   Current Visit: No   





(3) Diarrhea


SNOMED Code(s): 37932616


   Code(s): R19.7 - DIARRHEA, UNSPECIFIED   Status: Resolved   Current Visit: No

  





(4) Hypotensive episode


SNOMED Code(s): 98915091


   Code(s): I95.9 - HYPOTENSION, UNSPECIFIED   Status: Resolved   Current Visit:

No   





(5) Multiple myeloma


SNOMED Code(s): 593623609


   Code(s): C90.00 - MULTIPLE MYELOMA NOT HAVING ACHIEVED REMISSION   Status: 

Acute   Current Visit: No   Onset Date: ~01/20/21   





(6) Intertrochanteric fracture, hip


SNOMED Code(s): 154904387


   Code(s): S72.143A - DISPLACED INTERTROCHANTERIC FRACTURE OF UNSP FEMUR, INIT 

 Status: Acute   Current Visit: No   Onset Date: ~01/08/21   





(7) Closed fracture of left proximal humerus


SNOMED Code(s): 26406096


   Code(s): S42.202A - UNSP FRACTURE OF UPPER END OF LEFT HUMERUS, INIT FOR CLOS

FX   Status: Acute   Current Visit: No   Onset Date: ~01/08/21   





(8) Pathological fracture due to neoplastic disease


SNOMED Code(s): 438059213


   Code(s): M84.50XA - PATHOLOGICAL FRACTURE IN NEOPLASTIC DISEASE, UNSP SITE, 

INIT   Status: Acute   Current Visit: No   


Qualifiers: 


   Site of pathological fracture: hip 





(9) Asthma, moderate persistent


SNOMED Code(s): 964258244


   Code(s): J45.40 - MODERATE PERSISTENT ASTHMA, UNCOMPLICATED   Status: Chronic

  Current Visit: No   





(10) Esophageal reflux


SNOMED Code(s): 030437757


   Code(s): K21.9 - GASTRO-ESOPHAGEAL REFLUX DISEASE WITHOUT ESOPHAGITIS   

Status: Chronic   Current Visit: No   





(11) Obesity (BMI 35.0-39.9 without comorbidity)


SNOMED Code(s): 162075372, 551326952


   Code(s): E66.9 - OBESITY, UNSPECIFIED   Status: Chronic   Current Visit: No  







(12) Sciatica


SNOMED Code(s): 65822785


   Code(s): M54.30 - SCIATICA, UNSPECIFIED SIDE   Status: Chronic   Current 

Visit: No   


Qualifiers: 


 





(13) Hypothyroidism


SNOMED Code(s): 89203489


   Code(s): E03.9 - HYPOTHYROIDISM, UNSPECIFIED   Status: Chronic   Current 

Visit: No   





- Problem List Review


Problem List Initiated/Reviewed/Updated: Yes





- My Orders


Last 24 Hours: 


My Active Orders





02/17/21 17:00


Alum Hydroxide/Mag Hydroxide [Mag-Al Susp] 30 ml Lidocaine 2% [Xylocaine 2% 

Viscous] 30 ml diphenhydrAMINE [Benadryl] 75 mg Nystatin [Mycostatin] 30 ml PO 

QID 


Cefepime [Maxipime]   2 gm IVPUSH Q12H 





02/19/21 06:00


BASIC METABOLIC PANEL,BMP [CHEM] Routine 


CBC WITH AUTO DIFF [HEME] Routine 





02/19/21 12:30


VANCOMYCIN TROUGH [CHEM] Timed 














- Plan


Plan:: 





1. Neutropenic fever: Day 6: Cefepime 2 g IV q12h adjusted for renal function, 

Vancomycin per pharmacy, trough 2/19 at 1230, Zarxio 480 mg daily received 2 

doses, WBC up to 5.7. Labs tomorrow am. 


2. Pancytopenia: Hgb 9.1 had 2 units transfused PRBCs over weekend; platelets: 

35 today, transfused 2 unit platelets Sunday and yesterday. Repeat CBC tomorrow.


3. Multiple myeloma: Held Revlimid until she sees Dr Reyes next week. Velcade &

 Dexamethasone weekly held til seen by Dr Reyes.


4. Left humeral/hip fracture: resume PT/OT, X-ray showed hardware in good 

position, no new fractures.


5. DVT prophylaxis: TEDs BLE, Lovenox contraindicated due to thrombocytopenia.


6. Daughter Stephanie is been made medical power of  and their son Robert 

has been made financial power of . Stephanie contact number is 

[phone].

## 2021-02-19 NOTE — PCM.PN
- General Info


Date of Service: 02/19/21


Subjective Update: 





Melinda is feeling better. Swallowing is better since starting Magic mouthwash 

with Nystatin for oral candidiasis. Her breathing is good, having productive 

cough sometimes. No diarrhea. Feels like she will have bowel movement today. No 

nausea, vomiting. No bleeding. 





- Patient Data


Vitals - Most Recent: 


                                Last Vital Signs











Temp  97.8 F   02/19/21 12:53


 


Pulse  77   02/19/21 12:53


 


Resp  18   02/19/21 12:53


 


BP  105/58 L  02/19/21 12:53


 


Pulse Ox  98   02/19/21 12:53











Weight - Most Recent: 179 lb 9.6 oz


I&O - Last 24 Hours: 


                                 Intake & Output











 02/18/21 02/19/21 02/19/21





 22:59 06:59 14:59


 


Intake Total 700 100 300


 


Output Total 400 1125 


 


Balance 300 -1025 300











Lab Results Last 24 Hours: 


                         Laboratory Results - last 24 hr











  02/19/21 02/19/21 Range/Units





  12:30 12:30 


 


WBC   4.6  (3.0-10.3)  x10-3/uL


 


Corrected WBC   4.5  (4.5-12.0)  X10(3)


 


RBC   2.85 L  (3.60-5.20)  x10(6)uL


 


Hgb   8.6 L  (11.4-15.5)  g/dL


 


Hct   26.3 L  (34.2-48.2)  %


 


MCV   92.2  (76.7-100.5)  fL


 


MCH   30.1  (23.9-33.9)  pg


 


MCHC   32.7  (31.9-34.8)  g/dL


 


RDW   15.7  (12.3-16.5)  %


 


Plt Count   44 L  (151-488)  x10(3)uL


 


MPV   9.3  (7.1-12.4)  fL


 


Neut # (Auto)   3.3  (1.5-6.3)  x10-3/uL


 


Add Manual Diff   Yes  


 


Neutrophils % (Manual)   80  (46-82)  %


 


Band Neutrophils %   8 H  (0-6)  %


 


Lymphocytes % (Manual)   4 L  (13-37)  %


 


Monocytes % (Manual)   7  (4-12)  %


 


Eosinophils % (Manual)   1  (0-5)  %


 


Nucleated RBCs   2 H  (0-0)  /100WBC


 


Polychromasia   Moderate H  


 


Poikilocytosis   Moderate H  


 


Anisocytosis   Moderate H  


 


Macrocytosis   Occasional  


 


Sodium  141   (135-145)  mmol/L


 


Potassium  3.2 L   (3.5-5.3)  mmol/L


 


Chloride  109   (100-110)  mmol/L


 


Carbon Dioxide  22   (21-32)  mmol/L


 


BUN  16   (7-18)  mg/dL


 


Creatinine  0.7   (0.55-1.02)  mg/dL


 


Est Cr Clr Drug Dosing  51.41   mL/min


 


Estimated GFR (MDRD)  > 60   (>60)  


 


BUN/Creatinine Ratio  22.9 H   (9-20)  


 


Glucose  109   ()  mg/dL


 


Calcium  8.1 L   (8.6-10.2)  mg/dL


 


Vancomycin Trough  15.5 H   (<0.8)  ug/mL











Bob Results Last 24 Hours: 


                                  Microbiology











 02/13/21 08:50 Campylobacter Culture - Preliminary





 Stool / Feces Escherichia coli Shiga Toxins EIA - Preliminary











Med Orders - Current: 


                               Current Medications





Acetaminophen (Tylenol Extra Strength)  500 mg PO Q6H [facility]


   Last Admin: 02/19/21 13:44 Dose:  500 mg


   Documented by: 


Albuterol (Proventil Neb Soln)  2.5 mg NEB Q2H PRN


   PRN Reason: Dyspnea


   Last Admin: 02/15/21 01:12 Dose:  2.5 mg


   Documented by: 


Albuterol/Ipratropium (Duoneb 3.0-0.5 Mg/3 Ml)  3 ml INH 0700,1100,1500,1900 [facility]


   Last Admin: 02/19/21 11:16 Dose:  3 ml


   Documented by: 


Allopurinol (Zyloprim)  100 mg PO DAILY [facility]


   Last Admin: 02/19/21 08:06 Dose:  100 mg


   Documented by: 


Aspirin (Halfprin)  81 mg PO DAILY [facility]


   Last Admin: 02/19/21 08:06 Dose:  81 mg


   Documented by: 


Budesonide (Pulmicort)  0.5 mg NEB BID@0700,1900 [facility]


   Last Admin: 02/19/21 06:32 Dose:  0.5 mg


   Documented by: 


Calcium Carbonate/Glycine (Oyster Shell Calcium)  500 mg PO BID [facility]


   Last Admin: 02/19/21 08:06 Dose:  500 mg


   Documented by: 


Cefepime HCl (Maxipime)  2 gm IVPUSH Q12H [facility]


   Last Admin: 02/19/21 04:54 Dose:  2 gm


   Documented by: 


Al Hydroxide/Mg Hydroxide 30 ml/ Lidocaine HCl 30 ml/Diphenhydramine HCl 75 

mg/Nystatin 30 ml  0 ml PO QID [facility]


   Last Admin: 02/19/21 12:54 Dose:  5 ml


   Documented by: 


Diazepam (Valium)  2 mg PO TID PRN


   PRN Reason: Muscle Spasm


   Last Admin: 02/16/21 21:30 Dose:  2 mg


   Documented by: 


Ferrous Sulfate (Ferrous Sulfate)  325 mg PO DAILY [facility]


   Last Admin: 02/19/21 08:05 Dose:  325 mg


   Documented by: 


Sodium Chloride (Normal Saline)  250 mls @ 100 mls/hr IV ASDIRECTED [facility]


Vancomycin HCl 1.25 gm/ Sodium (Chloride)  250 mls @ 200 mls/hr IV Q24H [facility]


   Last Admin: 02/19/21 13:36 Dose:  200 mls/hr


   Documented by: 


Sodium Chloride (Normal Saline)  250 mls @ 100 mls/hr IV ASDIRECTED [facility]


Ibuprofen (Motrin)  200 mg PO Q6H [facility]


   Last Admin: 02/19/21 13:44 Dose:  200 mg


   Documented by: 


Levothyroxine Sodium (Synthroid)  50 mcg PO ACBREAKFAST [facility]


   Last Admin: 02/19/21 06:32 Dose:  50 mcg


   Documented by: 


Melatonin (Melatonin)  6 mg PO BEDTIME PRN


   PRN Reason: Insomnia


   Last Admin: 02/16/21 21:30 Dose:  6 mg


   Documented by: 


Montelukast Sodium (Singulair)  10 mg PO BEDTIME [facility]


   Last Admin: 02/18/21 20:00 Dose:  10 mg


   Documented by: 


Ondansetron HCl (Zofran Odt)  4 mg PO Q6H PRN


   PRN Reason: Nausea/Vomiting


   Last Admin: 02/19/21 09:50 Dose:  4 mg


   Documented by: 


Pantoprazole Sodium (Protonix***)  40 mg PO ACBREAKFAST [facility]


   Last Admin: 02/19/21 06:32 Dose:  40 mg


   Documented by: 


Polyethylene Glycol (Miralax)  17 gm PO DAILY PRN


   PRN Reason: CONSTIPATION


Saccharomyces Boulardii (Florastor)  250 mg PO BID [facility]


   Last Admin: 02/19/21 08:06 Dose:  250 mg


   Documented by: 


Sodium Chloride (Saline Flush)  10 ml FLUSH ASDIRECTED PRN


   PRN Reason: Keep Vein Open


   Last Admin: 02/19/21 13:36 Dose:  10 ml


   Documented by: 


Tramadol HCl (Ultram)  50 mg PO DAILY@0800 [facility]


   Last Admin: 02/19/21 08:12 Dose:  50 mg


   Documented by: 


Tramadol HCl (Ultram)  50 mg PO Q6H PRN


   PRN Reason: Pain (4-6/10)


   Last Admin: 02/16/21 21:32 Dose:  50 mg


   Documented by: 


Trimethoprim/Sulfamethoxazole (Septra Ds)  1 tab PO MoWeFr@0900 [facility]


   Last Admin: 02/19/21 08:06 Dose:  1 tab


   Documented by: 


Valacyclovir HCl (Valtrex)  500 mg PO DAILY [facility]


   Last Admin: 02/19/21 08:06 Dose:  500 mg


   Documented by: 


Vancomycin HCl (Pharmacy To Dose - Vancomycin)  1 dose .XX ASDIRECTED [facility]





Discontinued Medications





Acetaminophen (Tylenol Extra Strength)  500 mg PO Q6H [facility]


   Last Admin: 02/13/21 16:00 Dose:  Not Given


   Documented by: 


Albuterol/Ipratropium (Duoneb 3.0-0.5 Mg/3 Ml)  3 ml INH QIDRT [facility]


   Last Admin: 02/13/21 16:00 Dose:  Not Given


   Documented by: 


Budesonide (Pulmicort)  0.5 mg NEB BIDRT [facility]


Cefepime HCl (Maxipime)  2 gm IVPUSH Q8H [facility]


   Last Admin: 02/17/21 05:30 Dose:  2 gm


   Documented by: 


Dexamethasone (Dexamethasone)  40 mg PO Q7D [facility]


Filgrastim-Sndz (Zarxio)  480 mcg SUBCUT Q24H [facility]


   Last Admin: 02/16/21 11:46 Dose:  480 mcg


   Documented by: 


Furosemide (Lasix)  40 mg IVPUSH NOW STA


   Stop: 02/14/21 10:30


   Last Admin: 02/14/21 14:49 Dose:  Not Given


   Documented by: 


Furosemide (Lasix)  20 mg IVPUSH NOW ONE


   Stop: 02/14/21 12:21


   Last Admin: 02/14/21 14:22 Dose:  20 mg


   Documented by: 


Furosemide (Lasix)  20 mg IVPUSH BTNUNITS ONE


   Stop: 02/14/21 12:21


   Last Admin: 02/14/21 14:41 Dose:  20 mg


   Documented by: 


Sodium Chloride (Normal Saline)  250 mls @ 100 mls/hr IV ASDIRECTED [facility]


Sodium Chloride (Normal Saline)  1,000 mls @ 100 mls/hr IV ASDIRECTED [facility]


   Last Admin: 02/13/21 23:55 Dose:  100 mls/hr


   Documented by: 


Vancomycin HCl 2 gm/ Premix  400 mls @ 200 mls/hr IV ONETIME ONE


   Stop: 02/13/21 14:59


   Last Admin: 02/13/21 13:38 Dose:  200 mls/hr


   Documented by: 


Vancomycin HCl 1 gm/ Premix  200 mls @ 200 mls/hr IV Q24H [facility]


   Last Admin: 02/15/21 15:00 Dose:  200 mls/hr


   Documented by: 


Ibuprofen (Motrin)  200 mg PO Q6H [facility]


   Last Admin: 02/13/21 15:59 Dose:  Not Given


   Documented by: 


Revlimid 25 Mg (Capsule *Pt Own Med*)  1 each PO DAILY [facility]


   Stop: 02/22/21 09:01


   Last Admin: 02/14/21 08:39 Dose:  1 each


   Documented by: 


Pantoprazole Sodium (Protonix***)  40 mg PO DAILY PRN


   PRN Reason: Dyspepsia


Sodium Chloride (Saline Flush)  10 ml FLUSH ASDIRECTED PRN


   PRN Reason: Keep Vein Open











- Exam


General: Alert, Oriented, Cooperative, No Acute Distress


Lungs: Clear to Auscultation, Normal Respiratory Effort, Decreased Breath Sounds

(bibasilar).  No: Crackles, Wheezing


Cardiovascular: Regular Rate, Regular Rhythm


GI/Abdominal Exam: Normal Bowel Sounds, Soft, Non-Tender, No Distention


Extremities: Pedal Edema (1+ BLE)





- Patient Data


Lab Results Last 24 hrs: 


                         Laboratory Results - last 24 hr











  02/19/21 02/19/21 Range/Units





  12:30 12:30 


 


WBC   4.6  (3.0-10.3)  x10-3/uL


 


Corrected WBC   4.5  (4.5-12.0)  X10(3)


 


RBC   2.85 L  (3.60-5.20)  x10(6)uL


 


Hgb   8.6 L  (11.4-15.5)  g/dL


 


Hct   26.3 L  (34.2-48.2)  %


 


MCV   92.2  (76.7-100.5)  fL


 


MCH   30.1  (23.9-33.9)  pg


 


MCHC   32.7  (31.9-34.8)  g/dL


 


RDW   15.7  (12.3-16.5)  %


 


Plt Count   44 L  (151-488)  x10(3)uL


 


MPV   9.3  (7.1-12.4)  fL


 


Neut # (Auto)   3.3  (1.5-6.3)  x10-3/uL


 


Add Manual Diff   Yes  


 


Neutrophils % (Manual)   80  (46-82)  %


 


Band Neutrophils %   8 H  (0-6)  %


 


Lymphocytes % (Manual)   4 L  (13-37)  %


 


Monocytes % (Manual)   7  (4-12)  %


 


Eosinophils % (Manual)   1  (0-5)  %


 


Nucleated RBCs   2 H  (0-0)  /100WBC


 


Polychromasia   Moderate H  


 


Poikilocytosis   Moderate H  


 


Anisocytosis   Moderate H  


 


Macrocytosis   Occasional  


 


Sodium  141   (135-145)  mmol/L


 


Potassium  3.2 L   (3.5-5.3)  mmol/L


 


Chloride  109   (100-110)  mmol/L


 


Carbon Dioxide  22   (21-32)  mmol/L


 


BUN  16   (7-18)  mg/dL


 


Creatinine  0.7   (0.55-1.02)  mg/dL


 


Est Cr Clr Drug Dosing  51.41   mL/min


 


Estimated GFR (MDRD)  > 60   (>60)  


 


BUN/Creatinine Ratio  22.9 H   (9-20)  


 


Glucose  109   ()  mg/dL


 


Calcium  8.1 L   (8.6-10.2)  mg/dL


 


Vancomycin Trough  15.5 H   (<0.8)  ug/mL











Result Diagrams: 


                                 02/19/21 12:30





                                 02/19/21 12:30


Bob Results Last 24 hrs: 


                                  Microbiology











 02/13/21 08:50 Campylobacter Culture - Preliminary





 Stool / Feces Escherichia coli Shiga Toxins EIA - Preliminary














Sepsis Event Note





- Evaluation


Sepsis Screening Result: No Definite Risk





- Focused Exam


Vital Signs: 


                                   Vital Signs











  Temp Pulse Resp BP Pulse Ox


 


 02/19/21 12:53  97.8 F  77  18  105/58 L  98


 


 02/19/21 11:30   80   


 


 02/19/21 11:16   74   


 


 02/19/21 07:50  97.7 F  74  22 H  122/63  98














- Problem List & Annotations


(1) Neutropenic fever


SNOMED Code(s): 146602550


   Code(s): D70.9 - NEUTROPENIA, UNSPECIFIED; R50.81 - FEVER PRESENTING WITH 

CONDITIONS CLASSIFIED ELSEWHERE   Status: Acute   Current Visit: No   





(2) Pancytopenia due to chemotherapy


SNOMED Code(s): 7093431, 269584900


   Code(s): D61.810 - ANTINEOPLASTIC CHEMOTHERAPY INDUCED PANCYTOPENIA   Status:

Acute   Current Visit: No   





(3) Hypotensive episode


SNOMED Code(s): 18067897


   Code(s): I95.9 - HYPOTENSION, UNSPECIFIED   Status: Resolved   Current Visit:

No   





(4) Multiple myeloma


SNOMED Code(s): 586919407


   Code(s): C90.00 - MULTIPLE MYELOMA NOT HAVING ACHIEVED REMISSION   Status: 

Acute   Current Visit: No   Onset Date: ~01/20/21   





(5) Intertrochanteric fracture, hip


SNOMED Code(s): 145045262


   Code(s): S72.143A - DISPLACED INTERTROCHANTERIC FRACTURE OF UNSP FEMUR, INIT 

 Status: Acute   Current Visit: No   Onset Date: ~01/08/21   





(6) Closed fracture of left proximal humerus


SNOMED Code(s): 01019611


   Code(s): S42.202A - UNSP FRACTURE OF UPPER END OF LEFT HUMERUS, INIT FOR CLOS

FX   Status: Acute   Current Visit: No   Onset Date: ~01/08/21   





(7) Pathological fracture due to neoplastic disease


SNOMED Code(s): 208698350


   Code(s): M84.50XA - PATHOLOGICAL FRACTURE IN NEOPLASTIC DISEASE, UNSP SITE, 

INIT   Status: Acute   Current Visit: No   


Qualifiers: 


   Site of pathological fracture: hip 





(8) Asthma, moderate persistent


SNOMED Code(s): 944155452


   Code(s): J45.40 - MODERATE PERSISTENT ASTHMA, UNCOMPLICATED   Status: Chronic

  Current Visit: No   





(9) Esophageal reflux


SNOMED Code(s): 596277301


   Code(s): K21.9 - GASTRO-ESOPHAGEAL REFLUX DISEASE WITHOUT ESOPHAGITIS   

Status: Chronic   Current Visit: No   





(10) Obesity (BMI 35.0-39.9 without comorbidity)


SNOMED Code(s): 449938114, 799216927


   Code(s): E66.9 - OBESITY, UNSPECIFIED   Status: Chronic   Current Visit: No  







(11) Sciatica


SNOMED Code(s): 60977513


   Code(s): M54.30 - SCIATICA, UNSPECIFIED SIDE   Status: Chronic   Current 

Visit: No   


Qualifiers: 


 





(12) Hypothyroidism


SNOMED Code(s): 75154184


   Code(s): E03.9 - HYPOTHYROIDISM, UNSPECIFIED   Status: Chronic   Current 

Visit: No   





(13) Oral pharyngeal candidiasis


SNOMED Code(s): 17540358


   Code(s): B37.0 - CANDIDAL STOMATITIS   Status: Acute   Current Visit: Yes   





- Problem List Review


Problem List Initiated/Reviewed/Updated: Yes





- My Orders


Last 24 Hours: 


My Active Orders





02/20/21 06:00


CBC WITH AUTO DIFF [HEME] Routine 














- Plan


Plan:: 





1. Neutropenic fever: Day 7: Cefepime 2 g IV q12h adjusted for renal function, 

Vancomycin per pharmacy, trough 2/19 at 1230 was 15.5, Zarxio 480 mg daily 

received 2 doses, WBC 4.6. Labs tomorrow am. Swing bed Monday.


2. Pancytopenia: Hgb 8.6 had 2 units transfused PRBCs over weekend; platelets: 

44 today, transfused 2 unit platelets Sunday and 2/16. Repeat CBC tomorrow.


3. Multiple myeloma: Held Revlimid until she sees Dr Reyes next week 2/23. 

Velcade & Dexamethasone weekly held til seen by Dr Reyes.


4. Left humeral/hip fracture: PT/OT.


5. Oral candidiasis: Magic mouthwash with Nystatin, swish & swallow qid.


6. DVT prophylaxis: TEDs BLE, Lovenox contraindicated due to thrombocytopenia.

## 2021-02-20 NOTE — PCM.PN
- General Info


Date of Service: 02/20/21


Subjective Update: 





Melinda is feeling better, walked from couch to door and back yesterday. Her 

throat & mouth are feeling better. Still no taste, appetite. Forcing herself to 

eat. No fevers. Had bowel movement this morning, formed. Stool cultures negative

Campylobacter, E. coli, still pending for Shigella & Salmonella. Blood pressures

have been stable. No fevers. 


Functional Status: Reports: Pain Controlled, Tolerating Diet, Ambulating, 

Urinating.  Denies: New Symptoms





- Patient Data


Vitals - Most Recent: 


                                Last Vital Signs











Temp  97.8 F   02/20/21 12:30


 


Pulse  72   02/20/21 12:41


 


Resp  18   02/20/21 12:30


 


BP  105/57 L  02/20/21 12:30


 


Pulse Ox  98   02/20/21 12:30











Weight - Most Recent: 178 lb 6 oz


I&O - Last 24 Hours: 


                                 Intake & Output











 02/19/21 02/20/21 02/20/21





 22:59 06:59 14:59


 


Intake Total 240 400 


 


Balance 240 400 











Lab Results Last 24 Hours: 


                         Laboratory Results - last 24 hr











  02/20/21 Range/Units





  06:10 


 


WBC  3.0  (3.0-10.3)  x10-3/uL


 


RBC  2.90 L  (3.60-5.20)  x10(6)uL


 


Hgb  8.9 L  (11.4-15.5)  g/dL


 


Hct  26.4 L  (34.2-48.2)  %


 


MCV  91.0  (76.7-100.5)  fL


 


MCH  30.5  (23.9-33.9)  pg


 


MCHC  33.5  (31.9-34.8)  g/dL


 


RDW  15.6  (12.3-16.5)  %


 


Plt Count  50 L  (151-488)  x10(3)uL


 


MPV  9.7  (7.1-12.4)  fL


 


Add Manual Diff  Yes  


 


Neutrophils % (Manual)  62  (46-82)  %


 


Band Neutrophils %  2  (0-6)  %


 


Lymphocytes % (Manual)  13  (13-37)  %


 


Monocytes % (Manual)  13 H  (4-12)  %


 


Eosinophils % (Manual)  10 H  (0-5)  %


 


Poikilocytosis  Moderate H  


 


Anisocytosis  Moderate H  


 


Rouleaux  Occasional  











Bob Results Last 24 Hours: 


                                  Microbiology











 02/13/21 08:50 Salmonella/Shigella Screen - Final





 Stool / Feces Campylobacter Culture - Preliminary





 Escherichia coli Shiga Toxins EIA - Final











Med Orders - Current: 


                               Current Medications





Acetaminophen (Tylenol Extra Strength)  500 mg PO Q6H [facility]


   Last Admin: 02/20/21 09:47 Dose:  500 mg


   Documented by: 


Albuterol (Proventil Neb Soln)  2.5 mg NEB Q2H PRN


   PRN Reason: Dyspnea


   Last Admin: 02/15/21 01:12 Dose:  2.5 mg


   Documented by: 


Albuterol/Ipratropium (Duoneb 3.0-0.5 Mg/3 Ml)  3 ml INH 0700,1100,1500,1900 [facility]


   Last Admin: 02/20/21 12:30 Dose:  3 ml


   Documented by: 


Allopurinol (Zyloprim)  100 mg PO DAILY [facility]


   Last Admin: 02/20/21 09:59 Dose:  100 mg


   Documented by: 


Aspirin (Halfprin)  81 mg PO DAILY [facility]


   Last Admin: 02/20/21 09:50 Dose:  81 mg


   Documented by: 


Budesonide (Pulmicort)  0.5 mg NEB BID@0700,1900 [facility]


   Last Admin: 02/20/21 06:20 Dose:  0.5 mg


   Documented by: 


Calcium Carbonate/Glycine (Oyster Shell Calcium)  500 mg PO BID [facility]


   Last Admin: 02/20/21 09:50 Dose:  500 mg


   Documented by: 


Cefepime HCl (Maxipime)  2 gm IVPUSH Q12H [facility]


   Last Admin: 02/20/21 05:14 Dose:  2 gm


   Documented by: 


Al Hydroxide/Mg Hydroxide 30 ml/ Lidocaine HCl 30 ml/Diphenhydramine HCl 75 

mg/Nystatin 30 ml  0 ml PO QID [facility]


   Last Admin: 02/20/21 09:50 Dose:  5 ml


   Documented by: 


Diazepam (Valium)  2 mg PO TID PRN


   PRN Reason: Muscle Spasm


   Last Admin: 02/19/21 20:37 Dose:  2 mg


   Documented by: 


Ferrous Sulfate (Ferrous Sulfate)  325 mg PO DAILY [facility]


   Last Admin: 02/20/21 09:58 Dose:  325 mg


   Documented by: 


Sodium Chloride (Normal Saline)  250 mls @ 100 mls/hr IV ASDIRECTED [facility]


Vancomycin HCl 1.25 gm/ Sodium (Chloride)  250 mls @ 200 mls/hr IV Q24H [facility]


   Last Admin: 02/19/21 13:36 Dose:  200 mls/hr


   Documented by: 


Sodium Chloride (Normal Saline)  250 mls @ 100 mls/hr IV ASDIRECTED [facility]


Ibuprofen (Motrin)  200 mg PO Q6H [facility]


   Last Admin: 02/20/21 09:47 Dose:  200 mg


   Documented by: 


Levothyroxine Sodium (Synthroid)  50 mcg PO ACBREAKFAST [facility]


   Last Admin: 02/20/21 07:15 Dose:  50 mcg


   Documented by: 


Melatonin (Melatonin)  6 mg PO BEDTIME PRN


   PRN Reason: Insomnia


   Last Admin: 02/19/21 20:37 Dose:  6 mg


   Documented by: 


Montelukast Sodium (Singulair)  10 mg PO BEDTIME [facility]


   Last Admin: 02/19/21 20:24 Dose:  10 mg


   Documented by: 


Ondansetron HCl (Zofran Odt)  4 mg PO Q6H PRN


   PRN Reason: Nausea/Vomiting


   Last Admin: 02/20/21 08:14 Dose:  4 mg


   Documented by: 


Pantoprazole Sodium (Protonix***)  40 mg PO ACBREAKFAST [facility]


   Last Admin: 02/20/21 07:15 Dose:  40 mg


   Documented by: 


Polyethylene Glycol (Miralax)  17 gm PO DAILY PRN


   PRN Reason: CONSTIPATION


Saccharomyces Boulardii (Florastor)  250 mg PO BID [facility]


   Last Admin: 02/20/21 09:59 Dose:  250 mg


   Documented by: 


Sodium Chloride (Saline Flush)  10 ml FLUSH ASDIRECTED PRN


   PRN Reason: Keep Vein Open


   Last Admin: 02/20/21 05:18 Dose:  10 ml


   Documented by: 


Tramadol HCl (Ultram)  50 mg PO DAILY@0800 [facility]


   Last Admin: 02/20/21 09:56 Dose:  50 mg


   Documented by: 


Tramadol HCl (Ultram)  50 mg PO Q6H PRN


   PRN Reason: Pain (4-6/10)


   Last Admin: 02/16/21 21:32 Dose:  50 mg


   Documented by: 


Trimethoprim/Sulfamethoxazole (Septra Ds)  1 tab PO MoWeFr@0900 [facility]


   Last Admin: 02/19/21 08:06 Dose:  1 tab


   Documented by: 


Valacyclovir HCl (Valtrex)  500 mg PO DAILY [facility]


   Last Admin: 02/20/21 09:50 Dose:  500 mg


   Documented by: 


Vancomycin HCl (Pharmacy To Dose - Vancomycin)  1 dose .XX ASDIRECTED [facility]





Discontinued Medications





Acetaminophen (Tylenol Extra Strength)  500 mg PO Q6H [facility]


   Last Admin: 02/13/21 16:00 Dose:  Not Given


   Documented by: 


Albuterol/Ipratropium (Duoneb 3.0-0.5 Mg/3 Ml)  3 ml INH QIDRT [facility]


   Last Admin: 02/13/21 16:00 Dose:  Not Given


   Documented by: 


Budesonide (Pulmicort)  0.5 mg NEB BIDRT [facility]


Cefepime HCl (Maxipime)  2 gm IVPUSH Q8H [facility]


   Last Admin: 02/17/21 05:30 Dose:  2 gm


   Documented by: 


Dexamethasone (Dexamethasone)  40 mg PO Q7D [facility]


Filgrastim-Sndz (Zarxio)  480 mcg SUBCUT Q24H [facility]


   Last Admin: 02/16/21 11:46 Dose:  480 mcg


   Documented by: 


Furosemide (Lasix)  40 mg IVPUSH NOW STA


   Stop: 02/14/21 10:30


   Last Admin: 02/14/21 14:49 Dose:  Not Given


   Documented by: 


Furosemide (Lasix)  20 mg IVPUSH NOW ONE


   Stop: 02/14/21 12:21


   Last Admin: 02/14/21 14:22 Dose:  20 mg


   Documented by: 


Furosemide (Lasix)  20 mg IVPUSH BTNUNITS ONE


   Stop: 02/14/21 12:21


   Last Admin: 02/14/21 14:41 Dose:  20 mg


   Documented by: 


Sodium Chloride (Normal Saline)  250 mls @ 100 mls/hr IV ASDIRECTED [facility]


Sodium Chloride (Normal Saline)  1,000 mls @ 100 mls/hr IV ASDIRECTED [facility]


   Last Admin: 02/13/21 23:55 Dose:  100 mls/hr


   Documented by: 


Vancomycin HCl 2 gm/ Premix  400 mls @ 200 mls/hr IV ONETIME ONE


   Stop: 02/13/21 14:59


   Last Admin: 02/13/21 13:38 Dose:  200 mls/hr


   Documented by: 


Vancomycin HCl 1 gm/ Premix  200 mls @ 200 mls/hr IV Q24H [facility]


   Last Admin: 02/15/21 15:00 Dose:  200 mls/hr


   Documented by: 


Ibuprofen (Motrin)  200 mg PO Q6H [facility]


   Last Admin: 02/13/21 15:59 Dose:  Not Given


   Documented by: 


Revlimid 25 Mg (Capsule *Pt Own Med*)  1 each PO DAILY [facility]


   Stop: 02/22/21 09:01


   Last Admin: 02/14/21 08:39 Dose:  1 each


   Documented by: 


Pantoprazole Sodium (Protonix***)  40 mg PO DAILY PRN


   PRN Reason: Dyspepsia


Sodium Chloride (Saline Flush)  10 ml FLUSH ASDIRECTED PRN


   PRN Reason: Keep Vein Open











- Exam


General: Alert, Oriented, Cooperative, No Acute Distress


HEENT: Mucous Membr. Moist/Pink, Other (White plaques resolved, posterior 

pharynx pink.)


Neck: Trachea Midline


Lungs: Clear to Auscultation, Normal Respiratory Effort, Decreased Breath Sounds

(bibasilar).  No: Crackles, Rales, Wheezing


Cardiovascular: Regular Rate, Regular Rhythm, No Murmurs


GI/Abdominal Exam: Normal Bowel Sounds, Soft, Non-Tender, No Distention


Extremities: Normal Capillary Refill, Pedal Edema (2+ BLE to knees)


Peripheral Pulses: 2+: Radial (L), Radial (R)





- Patient Data


Lab Results Last 24 hrs: 


                         Laboratory Results - last 24 hr











  02/20/21 Range/Units





  06:10 


 


WBC  3.0  (3.0-10.3)  x10-3/uL


 


RBC  2.90 L  (3.60-5.20)  x10(6)uL


 


Hgb  8.9 L  (11.4-15.5)  g/dL


 


Hct  26.4 L  (34.2-48.2)  %


 


MCV  91.0  (76.7-100.5)  fL


 


MCH  30.5  (23.9-33.9)  pg


 


MCHC  33.5  (31.9-34.8)  g/dL


 


RDW  15.6  (12.3-16.5)  %


 


Plt Count  50 L  (151-488)  x10(3)uL


 


MPV  9.7  (7.1-12.4)  fL


 


Add Manual Diff  Yes  


 


Neutrophils % (Manual)  62  (46-82)  %


 


Band Neutrophils %  2  (0-6)  %


 


Lymphocytes % (Manual)  13  (13-37)  %


 


Monocytes % (Manual)  13 H  (4-12)  %


 


Eosinophils % (Manual)  10 H  (0-5)  %


 


Poikilocytosis  Moderate H  


 


Anisocytosis  Moderate H  


 


Rouleaux  Occasional  











Result Diagrams: 


                                 02/20/21 06:10





                                 02/19/21 12:30


Bob Results Last 24 hrs: 


                                  Microbiology











 02/13/21 08:50 Salmonella/Shigella Screen - Final





 Stool / Feces Campylobacter Culture - Preliminary





 Escherichia coli Shiga Toxins EIA - Final














Sepsis Event Note





- Evaluation


Sepsis Screening Result: No Definite Risk





- Focused Exam


Vital Signs: 


                                   Vital Signs











  Temp Pulse Resp BP Pulse Ox


 


 02/20/21 12:41   72   


 


 02/20/21 12:30  97.8 F  72  18  105/57 L  98


 


 02/20/21 08:00  98.3 F  72  22 H  125/68  99


 


 02/20/21 06:17   79   


 


 02/20/21 05:54  97.8 F  78  16  106/56 L  95














- Problem List & Annotations


(1) Neutropenic fever


SNOMED Code(s): 641694662


   Code(s): D70.9 - NEUTROPENIA, UNSPECIFIED; R50.81 - FEVER PRESENTING WITH 

CONDITIONS CLASSIFIED ELSEWHERE   Status: Acute   Current Visit: No   





(2) Pancytopenia due to chemotherapy


SNOMED Code(s): 5209064, 397736364


   Code(s): D61.810 - ANTINEOPLASTIC CHEMOTHERAPY INDUCED PANCYTOPENIA   Status:

Acute   Current Visit: No   Annotation/Comment:: improving.    





(3) Multiple myeloma


SNOMED Code(s): 959697125


   Code(s): C90.00 - MULTIPLE MYELOMA NOT HAVING ACHIEVED REMISSION   Status: 

Acute   Current Visit: No   Onset Date: ~01/20/21   


Qualifiers: 


   Multiple myeloma remission status: not in remission   Qualified Code(s): 

C90.00 - Multiple myeloma not having achieved remission   





(4) Intertrochanteric fracture, hip


SNOMED Code(s): 786983649


   Code(s): S72.143A - DISPLACED INTERTROCHANTERIC FRACTURE OF UNSP FEMUR, INIT 

 Status: Acute   Current Visit: No   Onset Date: ~01/08/21   





(5) Closed fracture of left proximal humerus


SNOMED Code(s): 22407245


   Code(s): S42.202A - UNSP FRACTURE OF UPPER END OF LEFT HUMERUS, INIT FOR CLOS

FX   Status: Acute   Current Visit: No   Onset Date: ~01/08/21   





(6) Pathological fracture due to neoplastic disease


SNOMED Code(s): 762226050


   Code(s): M84.50XA - PATHOLOGICAL FRACTURE IN NEOPLASTIC DISEASE, UNSP SITE, 

INIT   Status: Acute   Current Visit: No   


Qualifiers: 


   Site of pathological fracture: hip 





(7) Asthma, moderate persistent


SNOMED Code(s): 957958916


   Code(s): J45.40 - MODERATE PERSISTENT ASTHMA, UNCOMPLICATED   Status: Chronic

  Current Visit: No   





(8) Esophageal reflux


SNOMED Code(s): 815237474


   Code(s): K21.9 - GASTRO-ESOPHAGEAL REFLUX DISEASE WITHOUT ESOPHAGITIS   

Status: Chronic   Current Visit: No   





(9) Obesity (BMI 35.0-39.9 without comorbidity)


SNOMED Code(s): 513251740, 442673022


   Code(s): E66.9 - OBESITY, UNSPECIFIED   Status: Chronic   Current Visit: No  







(10) Sciatica


SNOMED Code(s): 03500592


   Code(s): M54.30 - SCIATICA, UNSPECIFIED SIDE   Status: Chronic   Current 

Visit: No   


Qualifiers: 


 





(11) Hypothyroidism


SNOMED Code(s): 41962024


   Code(s): E03.9 - HYPOTHYROIDISM, UNSPECIFIED   Status: Chronic   Current 

Visit: No   





(12) Oral pharyngeal candidiasis


SNOMED Code(s): 35479229


   Code(s): B37.0 - CANDIDAL STOMATITIS   Status: Acute   Current Visit: Yes   





- Problem List Review


Problem List Initiated/Reviewed/Updated: Yes





- My Orders


Last 24 Hours: 


My Active Orders





02/21/21 06:00


BASIC METABOLIC PANEL,BMP [CHEM] Routine 


CBC WITH AUTO DIFF [HEME] Routine 














- Plan


Plan:: 





1. Neutropenic fever: Day 8: Cefepime 2 g IV q12h adjusted for renal function, 

Vancomycin per pharmacy, trough 2/19 at 1230 was 15.5, last day is 2/22 so no 

further troughs needed. Zarxio 480 mg daily received 2 doses, WBC 3.0. Labs 

tomorrow am. Swing bed Monday.


2. Pancytopenia: Hgb 8.9, stable. Had 2 units transfused PRBCs over last 

weekend; platelets: 50 today, improving, transfused 2 unit platelets Sunday 2/14

 and 2/16. Repeat CBC tomorrow.


3. Multiple myeloma: Held Revlimid until she sees Dr Reyes next week 2/23. 

Velcade & Dexamethasone weekly held til seen by Dr Reyes.


4. Left humeral/hip fracture: PT/OT.


5. Oral candidiasis: Magic mouthwash with Nystatin, swish & swallow qid. Im

proving. 


6. DVT prophylaxis: TEDs BLE, Lovenox contraindicated due to thrombocytopenia.

## 2021-02-21 NOTE — PCM.PN
- General Info


Date of Service: 02/21/21


Subjective Update: 





Melinda has had more productive cough since starting Flutter valve & Incentive 

spirometry, states mucus is pretty thick, having hard time mobilizing it. No 

chest pain or shortness of breath. She had formed bowel movement this morning. 

Swallowing is much better since starting Magic mouthwash with Nystatin. 


Functional Status: Reports: Pain Controlled, Tolerating Diet, Ambulating, 

Urinating, Incentive Spirometry.  Denies: New Symptoms





- Patient Data


Vitals - Most Recent: 


                                Last Vital Signs











Temp  98.8 F   02/21/21 04:55


 


Pulse  71   02/21/21 06:30


 


Resp  19   02/21/21 04:55


 


BP  113/60   02/21/21 04:55


 


Pulse Ox  100   02/21/21 04:55











Weight - Most Recent: 178 lb 8 oz


I&O - Last 24 Hours: 


                                 Intake & Output











 02/20/21 02/21/21 02/21/21





 22:59 06:59 14:59


 


Intake Total 400 200 


 


Balance 400 200 











Lab Results Last 24 Hours: 


                         Laboratory Results - last 24 hr











  02/21/21 02/21/21 Range/Units





  06:25 06:25 


 


WBC  3.2   (3.0-10.3)  x10-3/uL


 


RBC  2.77 L   (3.60-5.20)  x10(6)uL


 


Hgb  8.4 L   (11.4-15.5)  g/dL


 


Hct  25.3 L   (34.2-48.2)  %


 


MCV  91.2   (76.7-100.5)  fL


 


MCH  30.2   (23.9-33.9)  pg


 


MCHC  33.1   (31.9-34.8)  g/dL


 


RDW  15.5   (12.3-16.5)  %


 


Plt Count  52 L   (151-488)  x10(3)uL


 


MPV  9.2   (7.1-12.4)  fL


 


Add Manual Diff  Yes   


 


Neutrophils % (Manual)  53   (46-82)  %


 


Band Neutrophils %  6   (0-6)  %


 


Lymphocytes % (Manual)  12 L   (13-37)  %


 


Monocytes % (Manual)  18 H   (4-12)  %


 


Eosinophils % (Manual)  8 H   (0-5)  %


 


Metamyelocytes %  2 H   (0-0)  %


 


Myelocytes %  1 H   (0-0)  %


 


Nucleated RBCs  1 H   (0-0)  /100WBC


 


Poikilocytosis  Moderate H   


 


Anisocytosis  Moderate H   


 


Rouleaux  Occasional   


 


Sodium   140  (135-145)  mmol/L


 


Potassium   3.4 L  (3.5-5.3)  mmol/L


 


Chloride   109  (100-110)  mmol/L


 


Carbon Dioxide   23  (21-32)  mmol/L


 


BUN   16  (7-18)  mg/dL


 


Creatinine   0.7  (0.55-1.02)  mg/dL


 


Est Cr Clr Drug Dosing   51.41  mL/min


 


Estimated GFR (MDRD)   > 60  (>60)  


 


BUN/Creatinine Ratio   22.9 H  (9-20)  


 


Glucose   91  ()  mg/dL


 


Calcium   8.1 L  (8.6-10.2)  mg/dL











Bob Results Last 24 Hours: 


                                  Microbiology











 02/13/21 08:50 Salmonella/Shigella Screen - Final





 Stool / Feces Campylobacter Culture - Preliminary





 Escherichia coli Shiga Toxins EIA - Final











Med Orders - Current: 


                               Current Medications





Acetaminophen (Tylenol Extra Strength)  500 mg PO Q6H [facility]


   Last Admin: 02/21/21 08:09 Dose:  500 mg


   Documented by: 


Albuterol (Proventil Neb Soln)  2.5 mg NEB Q2H PRN


   PRN Reason: Dyspnea


   Last Admin: 02/15/21 01:12 Dose:  2.5 mg


   Documented by: 


Albuterol/Ipratropium (Duoneb 3.0-0.5 Mg/3 Ml)  3 ml INH 0700,1100,1500,1900 [facility]


   Last Admin: 02/21/21 06:29 Dose:  3 ml


   Documented by: 


Allopurinol (Zyloprim)  100 mg PO DAILY [facility]


   Last Admin: 02/21/21 08:24 Dose:  100 mg


   Documented by: 


Aspirin (Halfprin)  81 mg PO DAILY [facility]


   Last Admin: 02/21/21 08:24 Dose:  81 mg


   Documented by: 


Budesonide (Pulmicort)  0.5 mg NEB BID@0700,1900 [facility]


   Last Admin: 02/21/21 06:30 Dose:  0.5 mg


   Documented by: 


Calcium Carbonate/Glycine (Oyster Shell Calcium)  500 mg PO BID [facility]


   Last Admin: 02/21/21 08:25 Dose:  500 mg


   Documented by: 


Cefepime HCl (Maxipime)  2 gm IVPUSH Q12H [facility]


   Last Admin: 02/21/21 04:52 Dose:  2 gm


   Documented by: 


Al Hydroxide/Mg Hydroxide 30 ml/ Lidocaine HCl 30 ml/Diphenhydramine HCl 75 

mg/Nystatin 30 ml  0 ml PO QID [facility]


   Last Admin: 02/21/21 08:26 Dose:  5 ml


   Documented by: 


Diazepam (Valium)  2 mg PO TID PRN


   PRN Reason: Muscle Spasm


   Last Admin: 02/19/21 20:37 Dose:  2 mg


   Documented by: 


Ferrous Sulfate (Ferrous Sulfate)  325 mg PO DAILY [facility]


   Last Admin: 02/21/21 08:24 Dose:  325 mg


   Documented by: 


Sodium Chloride (Normal Saline)  250 mls @ 100 mls/hr IV ASDIRECTED [facility]


Vancomycin HCl 1.25 gm/ Sodium (Chloride)  250 mls @ 200 mls/hr IV Q24H [facility]


   Last Admin: 02/20/21 14:33 Dose:  200 mls/hr


   Documented by: 


Sodium Chloride (Normal Saline)  250 mls @ 100 mls/hr IV ASDIRECTED [facility]


Ibuprofen (Motrin)  200 mg PO Q6H [facility]


   Last Admin: 02/21/21 08:09 Dose:  200 mg


   Documented by: 


Levothyroxine Sodium (Synthroid)  50 mcg PO ACBREAKFAST [facility]


   Last Admin: 02/21/21 06:30 Dose:  50 mcg


   Documented by: 


Melatonin (Melatonin)  6 mg PO BEDTIME PRN


   PRN Reason: Insomnia


   Last Admin: 02/19/21 20:37 Dose:  6 mg


   Documented by: 


Montelukast Sodium (Singulair)  10 mg PO BEDTIME [facility]


   Last Admin: 02/20/21 20:39 Dose:  10 mg


   Documented by: 


Ondansetron HCl (Zofran Odt)  4 mg PO Q6H PRN


   PRN Reason: Nausea/Vomiting


   Last Admin: 02/20/21 08:14 Dose:  4 mg


   Documented by: 


Pantoprazole Sodium (Protonix***)  40 mg PO ACBREAKFAST [facility]


   Last Admin: 02/21/21 06:30 Dose:  40 mg


   Documented by: 


Polyethylene Glycol (Miralax)  17 gm PO DAILY PRN


   PRN Reason: CONSTIPATION


Saccharomyces Boulardii (Florastor)  250 mg PO BID [facility]


   Last Admin: 02/21/21 08:24 Dose:  250 mg


   Documented by: 


Sodium Chloride (Saline Flush)  10 ml FLUSH ASDIRECTED PRN


   PRN Reason: Keep Vein Open


   Last Admin: 02/21/21 04:55 Dose:  10 ml


   Documented by: 


Tramadol HCl (Ultram)  50 mg PO DAILY@0800 [facility]


   Last Admin: 02/21/21 08:22 Dose:  50 mg


   Documented by: 


Tramadol HCl (Ultram)  50 mg PO Q6H PRN


   PRN Reason: Pain (4-6/10)


   Last Admin: 02/16/21 21:32 Dose:  50 mg


   Documented by: 


Trimethoprim/Sulfamethoxazole (Septra Ds)  1 tab PO MoWeFr@0900 [facility]


   Last Admin: 02/19/21 08:06 Dose:  1 tab


   Documented by: 


Valacyclovir HCl (Valtrex)  500 mg PO DAILY [facility]


   Last Admin: 02/21/21 08:23 Dose:  500 mg


   Documented by: 


Vancomycin HCl (Pharmacy To Dose - Vancomycin)  1 dose .XX ASDIRECTED [facility]





Discontinued Medications





Acetaminophen (Tylenol Extra Strength)  500 mg PO Q6H [facility]


   Last Admin: 02/13/21 16:00 Dose:  Not Given


   Documented by: 


Albuterol/Ipratropium (Duoneb 3.0-0.5 Mg/3 Ml)  3 ml INH QIDRT [facility]


   Last Admin: 02/13/21 16:00 Dose:  Not Given


   Documented by: 


Budesonide (Pulmicort)  0.5 mg NEB BIDRT [facility]


Cefepime HCl (Maxipime)  2 gm IVPUSH Q8H [facility]


   Last Admin: 02/17/21 05:30 Dose:  2 gm


   Documented by: 


Dexamethasone (Dexamethasone)  40 mg PO Q7D [facility]


Filgrastim-Sndz (Zarxio)  480 mcg SUBCUT Q24H [facility]


   Last Admin: 02/16/21 11:46 Dose:  480 mcg


   Documented by: 


Furosemide (Lasix)  40 mg IVPUSH NOW STA


   Stop: 02/14/21 10:30


   Last Admin: 02/14/21 14:49 Dose:  Not Given


   Documented by: 


Furosemide (Lasix)  20 mg IVPUSH NOW ONE


   Stop: 02/14/21 12:21


   Last Admin: 02/14/21 14:22 Dose:  20 mg


   Documented by: 


Furosemide (Lasix)  20 mg IVPUSH BTNUNITS ONE


   Stop: 02/14/21 12:21


   Last Admin: 02/14/21 14:41 Dose:  20 mg


   Documented by: 


Sodium Chloride (Normal Saline)  250 mls @ 100 mls/hr IV ASDIRECTED [facility]


Sodium Chloride (Normal Saline)  1,000 mls @ 100 mls/hr IV ASDIRECTED [facility]


   Last Admin: 02/13/21 23:55 Dose:  100 mls/hr


   Documented by: 


Vancomycin HCl 2 gm/ Premix  400 mls @ 200 mls/hr IV ONETIME ONE


   Stop: 02/13/21 14:59


   Last Admin: 02/13/21 13:38 Dose:  200 mls/hr


   Documented by: 


Vancomycin HCl 1 gm/ Premix  200 mls @ 200 mls/hr IV Q24H [facility]


   Last Admin: 02/15/21 15:00 Dose:  200 mls/hr


   Documented by: 


Ibuprofen (Motrin)  200 mg PO Q6H [facility]


   Last Admin: 02/13/21 15:59 Dose:  Not Given


   Documented by: 


Revlimid 25 Mg (Capsule *Pt Own Med*)  1 each PO DAILY [facility]


   Stop: 02/22/21 09:01


   Last Admin: 02/14/21 08:39 Dose:  1 each


   Documented by: 


Pantoprazole Sodium (Protonix***)  40 mg PO DAILY PRN


   PRN Reason: Dyspepsia


Sodium Chloride (Saline Flush)  10 ml FLUSH ASDIRECTED PRN


   PRN Reason: Keep Vein Open











- Exam


General: Alert, Oriented, Cooperative, No Acute Distress


Lungs: Clear to Auscultation, Normal Respiratory Effort, Crackles (rare 

bibasilar).  No: Wheezing


Cardiovascular: Regular Rate, Regular Rhythm, No Murmurs


GI/Abdominal Exam: Normal Bowel Sounds, Soft, Non-Tender, No Distention


Extremities: Pedal Edema (2+ BLE to knees)





- Patient Data


Lab Results Last 24 hrs: 


                         Laboratory Results - last 24 hr











  02/21/21 02/21/21 Range/Units





  06:25 06:25 


 


WBC  3.2   (3.0-10.3)  x10-3/uL


 


RBC  2.77 L   (3.60-5.20)  x10(6)uL


 


Hgb  8.4 L   (11.4-15.5)  g/dL


 


Hct  25.3 L   (34.2-48.2)  %


 


MCV  91.2   (76.7-100.5)  fL


 


MCH  30.2   (23.9-33.9)  pg


 


MCHC  33.1   (31.9-34.8)  g/dL


 


RDW  15.5   (12.3-16.5)  %


 


Plt Count  52 L   (151-488)  x10(3)uL


 


MPV  9.2   (7.1-12.4)  fL


 


Add Manual Diff  Yes   


 


Neutrophils % (Manual)  53   (46-82)  %


 


Band Neutrophils %  6   (0-6)  %


 


Lymphocytes % (Manual)  12 L   (13-37)  %


 


Monocytes % (Manual)  18 H   (4-12)  %


 


Eosinophils % (Manual)  8 H   (0-5)  %


 


Metamyelocytes %  2 H   (0-0)  %


 


Myelocytes %  1 H   (0-0)  %


 


Nucleated RBCs  1 H   (0-0)  /100WBC


 


Poikilocytosis  Moderate H   


 


Anisocytosis  Moderate H   


 


Rouleaux  Occasional   


 


Sodium   140  (135-145)  mmol/L


 


Potassium   3.4 L  (3.5-5.3)  mmol/L


 


Chloride   109  (100-110)  mmol/L


 


Carbon Dioxide   23  (21-32)  mmol/L


 


BUN   16  (7-18)  mg/dL


 


Creatinine   0.7  (0.55-1.02)  mg/dL


 


Est Cr Clr Drug Dosing   51.41  mL/min


 


Estimated GFR (MDRD)   > 60  (>60)  


 


BUN/Creatinine Ratio   22.9 H  (9-20)  


 


Glucose   91  ()  mg/dL


 


Calcium   8.1 L  (8.6-10.2)  mg/dL











Result Diagrams: 


                                 02/21/21 06:25





                                 02/21/21 06:25


Bob Results Last 24 hrs: 


                                  Microbiology











 02/13/21 08:50 Salmonella/Shigella Screen - Final





 Stool / Feces Campylobacter Culture - Preliminary





 Escherichia coli Shiga Toxins EIA - Final














Sepsis Event Note





- Evaluation


Sepsis Screening Result: No Definite Risk





- Focused Exam


Vital Signs: 


                                   Vital Signs











  Temp Pulse Resp BP Pulse Ox


 


 02/21/21 06:30   71   


 


 02/21/21 04:55  98.8 F  73  19  113/60  100














- Problem List & Annotations


(1) Neutropenic fever


SNOMED Code(s): 434319618


   Code(s): D70.9 - NEUTROPENIA, UNSPECIFIED; R50.81 - FEVER PRESENTING WITH 

CONDITIONS CLASSIFIED ELSEWHERE   Status: Acute   Current Visit: No   





(2) Pancytopenia due to chemotherapy


SNOMED Code(s): 1652466, 532379647


   Code(s): D61.810 - ANTINEOPLASTIC CHEMOTHERAPY INDUCED PANCYTOPENIA   Status:

Acute   Current Visit: No   Annotation/Comment:: improving.    





(3) Oral pharyngeal candidiasis


SNOMED Code(s): 33160412


   Code(s): B37.0 - CANDIDAL STOMATITIS   Status: Acute   Current Visit: Yes   





(4) Multiple myeloma


SNOMED Code(s): 248330862


   Code(s): C90.00 - MULTIPLE MYELOMA NOT HAVING ACHIEVED REMISSION   Status: 

Acute   Current Visit: No   Onset Date: ~01/20/21   


Qualifiers: 


   Multiple myeloma remission status: not in remission   Qualified Code(s): 

C90.00 - Multiple myeloma not having achieved remission   





(5) Intertrochanteric fracture, hip


SNOMED Code(s): 550163220


   Code(s): S72.143A - DISPLACED INTERTROCHANTERIC FRACTURE OF UNSP FEMUR, INIT 

 Status: Acute   Current Visit: No   Onset Date: ~01/08/21   





(6) Closed fracture of left proximal humerus


SNOMED Code(s): 65504780


   Code(s): S42.202A - UNSP FRACTURE OF UPPER END OF LEFT HUMERUS, INIT FOR CLOS

FX   Status: Acute   Current Visit: No   Onset Date: ~01/08/21   





(7) Pathological fracture due to neoplastic disease


SNOMED Code(s): 200483050


   Code(s): M84.50XA - PATHOLOGICAL FRACTURE IN NEOPLASTIC DISEASE, UNSP SITE, 

INIT   Status: Acute   Current Visit: No   


Qualifiers: 


   Site of pathological fracture: hip 





(8) Asthma, moderate persistent


SNOMED Code(s): 396508520


   Code(s): J45.40 - MODERATE PERSISTENT ASTHMA, UNCOMPLICATED   Status: Chronic

  Current Visit: No   





(9) Esophageal reflux


SNOMED Code(s): 407007770


   Code(s): K21.9 - GASTRO-ESOPHAGEAL REFLUX DISEASE WITHOUT ESOPHAGITIS   

Status: Chronic   Current Visit: No   





(10) Obesity (BMI 35.0-39.9 without comorbidity)


SNOMED Code(s): 074904063, 831616550


   Code(s): E66.9 - OBESITY, UNSPECIFIED   Status: Chronic   Current Visit: No  







(11) Sciatica


SNOMED Code(s): 26032714


   Code(s): M54.30 - SCIATICA, UNSPECIFIED SIDE   Status: Chronic   Current 

Visit: No   


Qualifiers: 


 





(12) Hypothyroidism


SNOMED Code(s): 56893011


   Code(s): E03.9 - HYPOTHYROIDISM, UNSPECIFIED   Status: Chronic   Current 

Visit: No   





- Problem List Review


Problem List Initiated/Reviewed/Updated: Yes





- My Orders


Last 24 Hours: 


My Active Orders





02/20/21 17:26


Communication Order [RC] Q1HWA 


RT Incentive Spirometry [RC] Q1HWA 














- Plan


Plan:: 





1. Neutropenic fever: Day 9: Cefepime 2 g IV q12h adjusted for renal function, 

Vancomycin per pharmacy, trough 2/19 at 1230 was 15.5, last day is 2/22 so no 

further troughs needed. Zarxio 480 mg daily received 2 doses, WBC 3.2. Labs 

tomorrow am. Swing bed Monday.


2. Pancytopenia: Hgb 8.4, dropped a little bit but not to point to transfuse. H

ad 2 units transfused PRBCs over last weekend; platelets: 52 today, improving, 

transfused 2 unit platelets Sunday 2/14 and 2/16. Repeat CBC tomorrow.


3. Multiple myeloma: Held Revlimid until she sees Dr Reyes next week 2/23. 

Velcade & Dexamethasone weekly held til seen by Dr Reyes.


4. Left humeral/hip fracture: PT/OT.


5. Oral candidiasis: Magic mouthwash with Nystatin, swish & swallow qid. Impro

ving. 


6. DVT prophylaxis: TEDs BLE, Lovenox contraindicated due to thrombocytopenia.

## 2021-02-22 NOTE — PCM.HP.2
H&P History of Present Illness





- General


Date of Service: 21


Admit Problem/Dx: 


                           Admission Diagnosis/Problem





Admission Diagnosis/Problem      Multiple myeloma, pathologic fractures 

secondary to multiple myeloma, weakness








Source of Information: Patient, Provider





- History of Present Illness


Initial Comments - Free Text/Narative: 


Acute HPI: Melinda was admitted to swing bed for rehab services on 21 for 

left humeral fracture, non weight bearing, s/p left hip ORIF, weight bearing as 

tolerated. She was found to have multiple myeloma on SPEP/UPEP done at [facility], oncology requested whole body CT scan which was done here. It showed 

multiple myeloma in her cranium, cervical, thoracic, lumbar spine, right distal 

radius, right acetabulum, left humerus & left hip. She received 5 rounds of radi

ation last week. Was started on Revlimid, Velcade SQ and dexamethasone 40 mg 

q7days on , Bactrim DS MoWe and Valtrex 500 mg daily for prophylaxis. 

She was hyperuricemic so was also started on Allopurinol. She developed diarrhea

on Friday(yesterday) with hypotension, called was placed to med oncology but did

not hear back from them. IV NS bolus 1 Liter was given and NS at 100 ml/hr but 

no change in her blood pressure. Remained afebrile, was pale, blood pressure 

still 80/40, fatigued so labs ordered: WBC 0.8, , Hgb 7.2, platelets 27. 

Chemistry was normal except for K 3.3, Creatinine 1.3, Albumin 2.8, Total 

protein 5.0. Chest x-ray pending and Urine pending. Blood culture pending. Spoke

with Oncology on call at [facility]: Recommended treating for neutropenic fever, 

get stool culture & c. diff, start Cefepime & Vancomycin, Neupogen 480 mcg 

daily, transfuse 1 unit of PRBCs, if platelets drop below 20, then will need to 

transfuse 1 unit of platelets as well. She is currently stable so will change 

her to acute care inpatient status, get above treatments started. Spoke with 

patient and her daughter Stephanie who is medical power of , they both 

would like to stay here and try treatments and only be transferred if she 

requires higher level of care. Family also wanted us to check to see if she 

could get her Velcade injections here when she doesn't have doctor appointments 

as it has been hard on her to travel for a 2 minute procedure. 





Hospital Course: 





Melinda was admitted from swing bed on  for neutropenic fever, had started 

Revlimid and Velcade on . Labs were checked and WBC was 0.8, , 

Platelets 27, Hgb 7.2. Placed in protective isolation until WBC were in normal 

range. Moved to acute care. Received 2 units of PRBCs, 1 units on , 1 unit 

of . Received 1 unit of platelets on  for platelet count of 17. 

Platelets came up to 31 then dropped again to 18 on 2/15, received 1 more unit 

of platelets same day. She received Zarxio 480 mcg daily x 2 doses, WBC came up 

to 5.7, then dropped to 3.2 but has remained stable. ANC came up to 2300. Has 

been on Cefepime q12 and Vancomycin q24, today is day 10, she has 1 more dose of

each this afternoon that she will get in swing bed. She had worsening swallowing

during hospital course, found to have oropharyngeal candidiasis, started on 

Magic mouthwash with Nystatin, this has improved her symptoms and swallowing. 

Still has no appetite with loss of taste but not smell. COVID tested prior to 

transfer to swing bed. PT/OT continued to work with on acute status, only able 

to do passive range of motion when her platelets were below 30. She was up 

walking with them today and on Friday. She will go to see Dr Reyes tomorrow and

will have labs prior to appointment.





- Related Data


Allergies/Adverse Reactions: 


                                    Allergies











Allergy/AdvReac Type Severity Reaction Status Date / Time


 


banana [Banana] Allergy  Respiratory Verified 21 11:20





   Depression  


 


corn [Bradford] Allergy  Respiratory Verified 21 11:20





   Depression  


 


iodine Allergy  Rash Verified 21 11:20


 


nut - unspecified Allergy  Swollen Verified 21 13:54





   Tongue  


 


oxycodone [Oxycodone] Allergy  Itching Verified 21 11:20


 


shellfish derived Allergy  Rash Verified 21 11:20











Home Medications: 


                                    Home Meds





Albuterol/Ipratropium [DuoNeb 3.0-0.5 MG/3 ML] 3 ml INH QID 17 [History]


Budesonide [Pulmicort] 0.5 mg IH BID 17 [History]


Levothyroxine Sodium 50 mcg PO DAILY 17 [History]


Montelukast [Singulair] 10 mg PO BEDTIME 17 [History]


Omeprazole Magnesium [Prilosec Otc] 20 mg PO DAILY PRN 17 [History]


Calcium Carbonate/Vitamin D3 [Calcium 600-Vit D3 200 Tablet] 1 tab PO BID 

21 [History]


Ferrous Sulfate 325 mg PO DAILY 21 [History]


Acetaminophen [Tylenol Extra Strength] 500 mg PO Q6H  tablet 21 [Rx]


Aspirin [Halfprin] 81 mg PO DAILY  tab.ec 21 [Rx]


Melatonin 6 mg PO BEDTIME PRN  tablet 21 [Rx]


Non-Formulary Medication [NF Drug] 0 each PO DAILY  each 21 [Rx]


Ondansetron [Zofran ODT] 4 mg PO Q6H PRN  tab.dis 21 [Rx]


Sulfamethoxazole/Trimethoprim [Septra DS] 1 tab PO MoWeFr@0900  tablet 21 

[Rx]


allopurinoL [Zyloprim] 100 mg PO DAILY  tablet 21 [Rx]


dexAMETHasone [Dexamethasone] 40 mg PO Q7D  tablet 21 [Rx]


diazePAM [Valium] 2 mg PO TID PRN  tablet 21 [Rx]


polyethylene glycoL 3350 [MiraLAX] 17 gm PO DAILY PRN  packet 21 [Rx]


traMADol [Ultram] 50 mg PO DAILY@0800  tablet 21 [Rx]


traMADol [Ultram] 50 mg PO Q6H PRN  tablet 21 [Rx]


valACYclovir HCl [Valtrex] 500 mg PO DAILY 30 Days #30 tablet 21 [Rx]


Mirtazapine [Remeron] 15 mg PO BEDTIME  tablet 21 [Rx]


Pantoprazole [ProTONIX***] 40 mg PO ACBREAKFAST  tab.cr 21 [Rx]


Saccharomyces Boulardii [Florastor] 250 mg PO BID  cap 21 [Rx]


guaiFENesin [Mucinex] 600 mg PO BID #0 tab.er 21 [Rx]











Past Medical History


HEENT History: Reports: Cataract


Cardiovascular History: 


Respiratory History: Reports: Asthma


Gastrointestinal History: Reports: Colon Polyp, GERD


Genitourinary History: Reports: None


OB/GYN History: Reports: Pregnancy


Other OB/BYN History: 


Musculoskeletal History: Reports: Arthritis, Fracture, Osteoarthritis, Other 

(See Below)


Other Musculoskeletal History: DJD


Neurological History: Reports: None


Psychiatric History: Reports: None


Endocrine/Metabolic History: Reports: Hypothyroidism, Obesity/BMI 30+


Hematologic History: Reports: Anemia, Blood Transfusion(s), Iron Deficiency


Immunologic History: Reports: None


Oncologic (Cancer) History: Reports: Other (See Below)


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 

disease 21 multiple myeloma


Dermatologic History: Reports: None





- Infectious Disease History


Infectious Disease History: Reports: Chicken Pox, Measles, Mumps





- Past Surgical History


Head Surgeries/Procedures: Reports: None


HEENT Surgical History: Reports: Adenoidectomy, Cataract Surgery, Tonsillectomy


Other HEENT Surgeries/Procedures: bilat cataract


GI Surgical History: Reports: Colonoscopy


Female  Surgical History: Reports: Hysterectomy, Salpingo-Oophorectomy


Musculoskeletal Surgical History: Reports: Arthroscopic Knee, Knee Replacement, 

Other (See Below)


Other Musculoskeletal Surgeries/Procedures:: partial R knee replacement, left 

hip replacement 21, left shoulder fracture 21





Social & Family History





- Family History


Family Medical History: No Pertinent Family History





- Caffeine Use


Caffeine Use: Reports: None





H&P Review of Systems





- Review of Systems:


Review Of Systems: See Below


General: Reports: Weakness, Decreased Appetite.  Denies: Fever, Chills


HEENT: Reports: No Symptoms


Pulmonary: Reports: Wheezing, Cough.  Denies: Shortness of Breath


Cardiovascular: Reports: Edema.  Denies: Chest Pain, Lightheadedness


Gastrointestinal: Reports: Decreased Appetite, Nausea.  Denies: Abdominal Pain, 

Constipation, Diarrhea, Difficulty Swallowing, Vomiting


Genitourinary: Reports: No Symptoms


Musculoskeletal: Reports: Arm Pain, Leg Pain, Joint Pain


Skin: Reports: Bruising


Psychiatric: Reports: No Symptoms


Neurological: Reports: No Symptoms


Hematologic/Lymphatic: Reports: Anemia, Easy Bleeding, Easy Bruising


Immunologic: Reports: Food Allergy, Environmental Allergy





Exam





- Exam


Exam: See Below





- Exam


General: Alert, Oriented, Cooperative.  No: Mild Distress


HEENT: PERRLA, Conjunctiva Clear, EOMI, Hearing Intact, Mucosa Moist & Pink, 

Posterior Pharynx Clear


Neck: Trachea Midline


Lungs: Clear to Auscultation, Normal Respiratory Effort, Wheezing (LLL, rare)


Cardiovascular: Regular Rate, Regular Rhythm


GI/Abdominal Exam: Normal Bowel Sounds, Soft, Non-Tender, No Distention


 (Female) Exam: Deferred


Rectal (Female) Exam: Deferred


Extremities: Pedal Edema (2+ BLE to needs, no erythema, no TTP), Pallor


Peripheral Pulses: 2+: Radial (L), Radial (R)


Skin: Warm, Dry, Intact


Neurological: Cranial Nerves Intact, Normal Speech, Normal Tone


Psychiatric: Normal Mood





*Q Meaningful Use (ADM)





- VTE *Q


VTE Pharmacological Contraindications *Q: Thrombocytopenia


VTE Anticoagulation Contraindications: Medical/Procedure Contrai





- VTE Risk Assess *Q


Each Risk Factor Represents 1 Point: None


Total Score 1 Point Risk Factors: 0


Each Risk Factor Represents 2 Points: Age 60 - 74 Years, Malignancy (present or 

previous)


Total Score 2 Point Risk Factors: 4


Each Risk Factor Represents 3 Points: None


Total Score 3 Point Risk Factors: 0


Each Risk Factor Represents 5 Points: None


Total Score 5 Point Risk Factors: 0


Venous Thromboembolism Risk Factor Score *Q: 4





- Problem List


(1) Closed fracture of left proximal humerus


SNOMED Code(s): 54016141


   ICD Code: S42.202A - UNSP FRACTURE OF UPPER END OF LEFT HUMERUS, INIT FOR 

CLOS FX   Status: Acute   Current Visit: No   Onset Date: ~21   





(2) Intertrochanteric fracture, hip


SNOMED Code(s): 774779692


   ICD Code: S72.143A - DISPLACED INTERTROCHANTERIC FRACTURE OF UNSP FEMUR, INIT

   Status: Acute   Current Visit: No   Onset Date: ~21   





(3) Multiple myeloma


SNOMED Code(s): 623439767


   ICD Code: C90.00 - MULTIPLE MYELOMA NOT HAVING ACHIEVED REMISSION   Status: 

Acute   Current Visit: No   Onset Date: ~21   


Qualifiers: 


   Multiple myeloma remission status: not in remission   Qualified Code(s): 

C90.00 - Multiple myeloma not having achieved remission   





(4) Oral pharyngeal candidiasis


SNOMED Code(s): 02905410


   ICD Code: B37.0 - CANDIDAL STOMATITIS   Status: Acute   Current Visit: No   

Problem Details: improving   





(5) Pancytopenia due to chemotherapy


SNOMED Code(s): 4061806, 866308109


   ICD Code: D61.810 - ANTINEOPLASTIC CHEMOTHERAPY INDUCED PANCYTOPENIA   

Status: Acute   Current Visit: No   Problem Details: improving.    





(6) Weakness


SNOMED Code(s): 85406406


   ICD Code: R53.1 - WEAKNESS   Status: Acute   Current Visit: Yes   





(7) Pathological fracture due to neoplastic disease


SNOMED Code(s): 250619466


   ICD Code: M84.50XA - PATHOLOGICAL FRACTURE IN NEOPLASTIC DISEASE, UNSP SITE, 

INIT   Status: Acute   Current Visit: No   


Qualifiers: 


   Site of pathological fracture: hip 





(8) Asthma, moderate persistent


SNOMED Code(s): 963292501


   ICD Code: J45.40 - MODERATE PERSISTENT ASTHMA, UNCOMPLICATED   Status: 

Chronic   Current Visit: No   





(9) Esophageal reflux


SNOMED Code(s): 962351328


   ICD Code: K21.9 - GASTRO-ESOPHAGEAL REFLUX DISEASE WITHOUT ESOPHAGITIS   

Status: Chronic   Current Visit: No   





(10) Hypothyroidism


SNOMED Code(s): 77303506


   ICD Code: E03.9 - HYPOTHYROIDISM, UNSPECIFIED   Status: Chronic   Current V

isit: No   





(11) Obesity (BMI 35.0-39.9 without comorbidity)


SNOMED Code(s): 541688931, 706785063


   ICD Code: E66.9 - OBESITY, UNSPECIFIED   Status: Chronic   Current Visit: No 

  


Problem List Initiated/Reviewed/Updated: Yes


Orders Last 24hrs: 


                               Active Orders 24 hr











 Category Date Time Status


 


 Patient Status [ADT] Routine ADT  21 12:56 Active


 


 Height and Weight [RC] WEEKLY Care  21 12:56 Active


 


 Oxygen Therapy [RC] PRN Care  21 12:56 Active


 


 RT Aerosol Therapy [RC] ASDIRECTED Care  21 13:04 Active


 


 Up With Assistance [RC] ASDIRECTED Care  21 12:56 Active


 


 Up to Chair [RC] ASDIRECTED Care  21 12:56 Active


 


 VTE/DVT Education [RC] Per Unit Routine Care  21 12:56 Active


 


 Vital Signs [RC] PER UNIT ROUTINE Care  21 12:56 Active


 


 OT Evaluation and Treatment [CONS] Routine Cons  21 12:56 Active


 


 PT Evaluation and Treatment [CONS] Routine Cons  21 12:56 Active


 


 Regular Diet [DIET] Diet  21 Dinner Active


 


 Acetaminophen [Tylenol Extra Strength] Med  21 13:15 Ordered





 500 mg PO Q6H   


 


 Albuterol/Ipratropium [DuoNeb 3.0-0.5 MG/3 ML] Med  21 17:00 Ordered





 3 ml INH QID   


 


 Alum Hydroxide/Mag Hydroxide [Mag-Al Susp] 30 ml Med  21 13:00 Active





 Lidocaine 2% [Xylocaine 2% Viscous] 30 ml   





 diphenhydrAMINE [Benadryl] 75 mg   





 Nystatin [Mycostatin] 30 ml   





 PO QID   


 


 Aspirin [Halfprin] Med  21 09:00 Ordered





 81 mg PO DAILY   


 


 Budesonide [Pulmicort] Med  21 21:00 Ordered





 0.5 mg NEB BID   


 


 Calcium Carbonate/Vitamin D3 [Calcium 600-Vit D3 200 Med  21 21:00 

Ordered





 Tablet]   





 1 tab PO BID   


 


 Cefepime [Maxipime] Med  21 17:00 Once





 2 gm IVPUSH ONETIME ONE   


 


 Ferrous Sulfate Med  21 09:00 Ordered





 325 mg PO DAILY   


 


 Levothyroxine [Synthroid] Med  21 09:00 Ordered





 50 mcg PO DAILY   


 


 Melatonin Med  21 13:02 Ordered





 6 mg PO BEDTIME PRN   


 


 Mirtazapine [Remeron] Med  21 21:00 Ordered





 15 mg PO BEDTIME   


 


 Montelukast [Singulair] Med  21 21:00 Ordered





 10 mg PO BEDTIME   


 


 Ondansetron [Zofran ODT] Med  21 13:02 Ordered





 4 mg PO Q6H PRN   


 


 Pantoprazole [ProTONIX***] Med  21 07:30 Ordered





 40 mg PO ACBREAKFAST   


 


 Saccharomyces Boulardii [Florastor] Med  21 21:00 Ordered





 250 mg PO BID   


 


 Sulfamethoxazole/Trimethoprim [Septra DS] Med  21 09:00 Ordered





 1 tab PO MoWeFr@0900   


 


 Vancomycin 1.25 gm Med  21 13:30 Active





 Sodium Chloride 0.9% [Normal Saline (AdvBag)] 250 ml   





 IV ONETIME   


 


 allopurinoL [Zyloprim] Med  21 09:00 Ordered





 100 mg PO DAILY   


 


 diazePAM [Valium] Med  21 13:02 Ordered





 2 mg PO TID PRN   


 


 guaiFENesin [Mucinex] Med  21 21:00 Ordered





 600 mg PO BID   


 


 polyethylene glycoL 3350 [MiraLAX] Med  21 13:02 Ordered





 17 gm PO DAILY PRN   


 


 traMADol [Ultram] Med  21 08:00 Ordered





 50 mg PO DAILY@0800   


 


 traMADol [Ultram] Med  21 13:02 Ordered





 50 mg PO Q6H PRN   


 


 valACYclovir [Valtrex] Med  21 09:00 Ordered





 500 mg PO DAILY   


 


 Anticoagulation Contraindications VTE [AST] Per Unit Oth  21 12:56 

Ordered





 Routine   


 


 Antiembolic Hose [OM.PC] Per Unit Routine Oth  21 13:00 Ordered


 


 Resuscitation Status Routine Resus Stat  21 12:56 Ordered








                                Medication Orders





Acetaminophen (Tylenol Extra Strength)  500 mg PO Q6H WILIAM


Albuterol/Ipratropium (Duoneb 3.0-0.5 Mg/3 Ml)  3 ml INH QID WILIAM


Allopurinol (Zyloprim)  100 mg PO DAILY [facility]


Aspirin (Halfprin)  81 mg PO DAILY WILIAM


Budesonide (Pulmicort)  0.5 mg NEB BID WILIAM


Cefepime HCl (Maxipime)  2 gm IVPUSH ONETIME ONE


   Stop: 21 17:01


Al Hydroxide/Mg Hydroxide 30 ml/ Lidocaine HCl 30 ml/Diphenhydramine HCl 75 

mg/Nystatin 30 ml  0 ml PO QID WILIAM


Diazepam (Valium)  2 mg PO TID PRN


   PRN Reason: Muscle Spasm


Ferrous Sulfate (Ferrous Sulfate)  325 mg PO DAILY WILIAM


Guaifenesin (Mucinex)  600 mg PO BID WILIAM


Vancomycin HCl 1.25 gm/ Sodium (Chloride)  250 mls @ 200 mls/hr IV ONETIME ONE


   Stop: 21 14:44


Levothyroxine Sodium (Synthroid)  50 mcg PO DAILY [facility]


Melatonin (Melatonin)  6 mg PO BEDTIME PRN


   PRN Reason: Insomnia


Mirtazapine (Remeron)  15 mg PO BEDTIME WILIAM


Montelukast Sodium (Singulair)  10 mg PO BEDTIME [facility]


Non-Formulary Medication (Calcium Carbonate/Vitamin D3 [Calcium 600-Vit D3 200 

Tablet])  1 tab PO BID [facility]


Ondansetron HCl (Zofran Odt)  4 mg PO Q6H PRN


   PRN Reason: Nausea/Vomiting


Pantoprazole Sodium (Protonix***)  40 mg PO ACBREAKFAST [facility]


Polyethylene Glycol (Miralax)  17 gm PO DAILY PRN


   PRN Reason: CONSTIPATION


Saccharomyces Boulardii (Florastor)  250 mg PO BID [facility]


Tramadol HCl (Ultram)  50 mg PO DAILY@0800 [facility]


Tramadol HCl (Ultram)  50 mg PO Q6H PRN


   PRN Reason: Pain (-6/10)


Trimethoprim/Sulfamethoxazole (Septra Ds)  1 tab PO MoWeFr@0900 [facility]


Valacyclovir HCl (Valtrex)  500 mg PO DAILY [facility]








Assessment/Plan Comment:: 


1. Admit to swing bed for rehab services for pathologic fractures of Left 

humerus, left hip s/p pinning 21 secondary to multiple myeloma.


2. Pancytopenia  chemo: Repeat CBC and CMP tomorrow prior to oncology 

appointment. Last dose of Cefepime & Vancomycin this afternoon. Bactrim MoWeFr 

and Valtrex 500 mg daily prophylaxis


3. Oropharyngeal candidiasis: Magic mouthwash with nystatin qid swish & swallow 

through  afternoon.


4. Multiple myeloma(MM): Revlimid, Dexamethasone and Velcade held until seen by 

oncology.


5. Diet: Regular, ensure supplements/magic cup ice cream. Encourage fluid 

intake.


6. Pain control for MM: Acetaminophen 500 mg q6h, Tramadol 50 mg daily@0800, 

Tramadol 50 mg q6h as needed pain.


7. Activity: up to chair/assistance, walking with PT/OT.


8. DVT prophylaxis: TEDs BLE. Anticoagulation contraindicated due to 

thrombocytopenia. Platelets 61. 


9. CODE STATUS: FULL CODE. Care conference for Thursday at 1300. Plan 2-3 more 

weeks of PT/OT, discharge to home. Follow up with Dr Reyes at [facility] Oncology

tomorrow and [facility] Orthopedic Tomorrow both in [place]. Family will be trans

porting to her appointments. 








- Mortality Measure


Prognosis:: Poor

## 2021-02-22 NOTE — PCM.DCSUM1
**Discharge Summary





- Hospital Course


HPI Initial Comments: 





Melinda was admitted to [facility] for rehab services on 1/12/21 for left humeral 

fracture, non weight bearing, s/p left hip ORIF, weight bearing as tolerated. 

She was found to have multiple myeloma on SPEP/UPEP done at [facility], 

oncology requested whole body CT scan which was done here. It showed multiple 

myeloma in her cranium, cervical, thoracic, lumbar spine, right distal radius, 

right acetabulum, left humerus & left hip. She received 5 rounds of radiation 

last week. Was started on Revlimid, Velcade SQ and dexamethasone 40 mg q7days on

Tuesdays, Bactrim DS MoWeFr and Valtrex 500 mg daily for prophylaxis. She was 

hyperuricemic so was also started on Allopurinol. She developed diarrhea on 

Friday(yesterday) with hypotension, called was placed to med oncology but did 

not hear back from them. IV NS bolus 1 Liter was given and NS at 100 ml/hr but 

no change in her blood pressure. Remained afebrile, was pale, blood pressure 

still 80/40, fatigued so labs ordered: WBC 0.8, , Hgb 7.2, platelets 27. 

Chemistry was normal except for K 3.3, Creatinine 1.3, Albumin 2.8, Total 

protein 5.0. Chest x-ray pending and Urine pending. Blood culture pending. Spoke

with Oncology on call at [facility]: Recommended treating for neutropenic fever, 

get stool culture & c. diff, start Cefepime & Vancomycin, Neupogen 480 mcg 

daily, transfuse 1 unit of PRBCs, if platelets drop below 20, then will need to 

transfuse 1 unit of platelets as well. She is currently stable so will change 

her to acute care inpatient status, get above treatments started. Spoke with 

patient and her daughter Stephanie who is medical power of , they both 

would like to stay here and try treatments and only be transferred if she 

requires higher level of care. Family also wanted us to check to see if she 

could get her Velcade injections here when she doesn't have doctor appointments 

as it has been hard on her to travel for a 2 minute procedure. 





Diagnosis: Stroke: No





- Discharge Data


Discharge Date: 02/22/21


Discharge Disposition: DC/Tfer W/I Hosp To [place] [id number]


Condition: Good





- Referral to Home Health


Primary Care Physician: 


Duane Strand, MD








- Discharge Diagnosis/Problem(s)


(1) Neutropenic fever


SNOMED Code(s): 053297337


   ICD Code: D70.9 - NEUTROPENIA, UNSPECIFIED; R50.81 - FEVER PRESENTING WITH 

CONDITIONS CLASSIFIED ELSEWHERE   Status: Acute   Current Visit: No   





(2) Pancytopenia due to chemotherapy


SNOMED Code(s): 2625425, 999390825


   ICD Code: D61.810 - ANTINEOPLASTIC CHEMOTHERAPY INDUCED PANCYTOPENIA   

Status: Acute   Current Visit: No   Problem Details: improving.    





(3) Oral pharyngeal candidiasis


SNOMED Code(s): 29450864


   ICD Code: B37.0 - CANDIDAL STOMATITIS   Status: Acute   Current Visit: Yes   

Problem Details: improving   





(4) Multiple myeloma


SNOMED Code(s): 977722534


   ICD Code: C90.00 - MULTIPLE MYELOMA NOT HAVING ACHIEVED REMISSION   Status: 

Acute   Current Visit: No   Onset Date: ~01/20/21   


Qualifiers: 


   Multiple myeloma remission status: not in remission   Qualified Code(s): 

C90.00 - Multiple myeloma not having achieved remission   





(5) Intertrochanteric fracture, hip


SNOMED Code(s): 793947422


   ICD Code: S72.143A - DISPLACED INTERTROCHANTERIC FRACTURE OF UNSP FEMUR, INIT

  Status: Acute   Current Visit: No   Onset Date: ~01/08/21   





(6) Closed fracture of left proximal humerus


SNOMED Code(s): 30981376


   ICD Code: S42.202A - UNSP FRACTURE OF UPPER END OF LEFT HUMERUS, INIT FOR 

CLOS FX   Status: Acute   Current Visit: No   Onset Date: ~01/08/21   





(7) Pathological fracture due to neoplastic disease


SNOMED Code(s): 344127238


   ICD Code: M84.50XA - PATHOLOGICAL FRACTURE IN NEOPLASTIC DISEASE, UNSP SITE, 

INIT   Status: Acute   Current Visit: No   


Qualifiers: 


   Site of pathological fracture: hip 





(8) Asthma, moderate persistent


SNOMED Code(s): 201100897


   ICD Code: J45.40 - MODERATE PERSISTENT ASTHMA, UNCOMPLICATED   Status: 

Chronic   Current Visit: No   





(9) Esophageal reflux


SNOMED Code(s): 840897515


   ICD Code: K21.9 - GASTRO-ESOPHAGEAL REFLUX DISEASE WITHOUT ESOPHAGITIS   

Status: Chronic   Current Visit: No   





(10) Obesity (BMI 35.0-39.9 without comorbidity)


SNOMED Code(s): 249427214, 442149904


   ICD Code: E66.9 - OBESITY, UNSPECIFIED   Status: Chronic   Current Visit: No 

 





(11) Sciatica


SNOMED Code(s): 38929214


   ICD Code: M54.30 - SCIATICA, UNSPECIFIED SIDE   Status: Chronic   Current 

Visit: No   


Qualifiers: 


 





(12) Hypothyroidism


SNOMED Code(s): 21038900


   ICD Code: E03.9 - HYPOTHYROIDISM, UNSPECIFIED   Status: Chronic   Current 

Visit: No   





- Patient Summary/Data


Consults: 


                                  Consultations





02/13/21 11:49


OT Evaluation and Treatment [CONS] Routine 


   Please Evaluate and Treat.


   OT Reason for Consult: ADL's


   This query below is only for informational purposes and is not editable.


PT Evaluation and Treatment [CONS] Routine 


   Please Evaluate and Treat.


   PT Reason for Consult: Strengthening


   This query below is only for informational purposes and is not editable.











Hospital Course: 





Melinda was admitted from swing bed on 2/13 for neutropenic fever, had started 

Revlimid and Velcade on 2/9. Labs were checked and WBC was 0.8, , 

Platelets 27, Hgb 7.2. Placed in protective isolation until WBC were in normal 

range. Moved to acute care. Received 2 units of PRBCs, 1 units on 2/13, 1 unit 

of 2/14. Received 1 unit of platelets on 2/14 for platelet count of 17. 

Platelets came up to 31 then dropped again to 18 on 2/15, received 1 more unit 

of platelets same day. She received Zarxio 480 mcg daily x 2 doses, WBC came up 

to 5.7, then dropped to 3.2 but has remained stable. ANC came up to 2300. Has 

been on Cefepime q12 and Vancomycin q24, today is day 10, she has 1 more dose of

 each this afternoon that she will get in swing bed. She had worsening 

swallowing during hospital course, found to have oropharyngeal candidiasis, 

started on Magic mouthwash with Nystatin, this has improved her symptoms and 

swallowing. Still has no appetite with loss of taste but not smell. COVID tested

 prior to transfer to swing bed. PT/OT continued to work with on acute status, 

only able to do passive range of motion when her platelets were below 30. She 

was up walking with them today and on Friday. She will go to see Dr Reyes 

tomorrow and will have labs prior to appointment. 





- Patient Instructions


Diet: Regular Diet as Tolerated


Activity: As Tolerated


Showering/Bathing: May Shower


Other/Special Instructions: Transfer to swing bed.





- Discharge Plan


*PRESCRIPTION DRUG MONITORING PROGRAM REVIEWED*: Not Applicable


*COPY OF PRESCRIPTION DRUG MONITORING REPORT IN PATIENT ALEK: Not Applicable


Home Medications: 


                                    Home Meds





Albuterol/Ipratropium [DuoNeb 3.0-0.5 MG/3 ML] 3 ml INH QID 03/31/17 [History]


Budesonide [Pulmicort] 0.5 mg IH BID 03/31/17 [History]


Levothyroxine Sodium 50 mcg PO DAILY 03/31/17 [History]


Montelukast [Singulair] 10 mg PO BEDTIME 03/31/17 [History]


Omeprazole Magnesium [Prilosec Otc] 20 mg PO DAILY PRN 03/31/17 [History]


Calcium Carbonate/Vitamin D3 [Calcium 600-Vit D3 200 Tablet] 1 tab PO BID 

01/12/21 [History]


Ferrous Sulfate 325 mg PO DAILY 01/12/21 [History]


Acetaminophen [Tylenol Extra Strength] 500 mg PO Q6H  tablet 02/13/21 [Rx]


Aspirin [Halfprin] 81 mg PO DAILY  tab.ec 02/13/21 [Rx]


Melatonin 6 mg PO BEDTIME PRN  tablet 02/13/21 [Rx]


Non-Formulary Medication [NF Drug] 0 each PO DAILY  each 02/13/21 [Rx]


Ondansetron [Zofran ODT] 4 mg PO Q6H PRN  tab.dis 02/13/21 [Rx]


Sulfamethoxazole/Trimethoprim [Septra DS] 1 tab PO MoWeFr@0900  tablet 02/13/21 

[Rx]


allopurinoL [Zyloprim] 100 mg PO DAILY  tablet 02/13/21 [Rx]


dexAMETHasone [Dexamethasone] 40 mg PO Q7D  tablet 02/13/21 [Rx]


diazePAM [Valium] 2 mg PO TID PRN  tablet 02/13/21 [Rx]


polyethylene glycoL 3350 [MiraLAX] 17 gm PO DAILY PRN  packet 02/13/21 [Rx]


traMADol [Ultram] 50 mg PO DAILY@0800  tablet 02/13/21 [Rx]


traMADol [Ultram] 50 mg PO Q6H PRN  tablet 02/13/21 [Rx]


valACYclovir HCl [Valtrex] 500 mg PO DAILY 30 Days #30 tablet 02/13/21 [Rx]


Mirtazapine [Remeron] 15 mg PO BEDTIME  tablet 02/22/21 [Rx]


Pantoprazole [ProTONIX***] 40 mg PO ACBREAKFAST  tab.cr 02/22/21 [Rx]


Saccharomyces Boulardii [Florastor] 250 mg PO BID  cap 02/22/21 [Rx]


guaiFENesin [Mucinex] 600 mg PO BID #0 tab.er 02/22/21 [Rx]











- Discharge Summary/Plan Comment


DC Time >30 min.: No





- General Info


Date of Service: 02/22/21


Subjective Update: 





Melinda is feeling better, states her bone pain is improved. After she started 

flutter valve she has had more productive cough, got quite a bit up this 

morning. Has loss of taste but not smell. No fevers, chills. Still loss of 

appetite. Had formed stool today. Working with PT this morning. 


Functional Status: Reports: Pain Controlled, Tolerating Diet, Ambulating, 

Urinating, Incentive Spirometry.  Denies: New Symptoms





- Patient Data


Vitals - Most Recent: 


                                Last Vital Signs











Temp  97.8 F   02/22/21 11:53


 


Pulse  74   02/22/21 11:53


 


Resp  18   02/22/21 11:53


 


BP  119/64   02/22/21 11:53


 


Pulse Ox  97   02/22/21 11:53











Weight - Most Recent: 179 lb 2 oz


I&O - Last 24 hours: 


                                 Intake & Output











 02/21/21 02/22/21 02/22/21





 22:59 06:59 14:59


 


Intake Total 250  


 


Balance 250  











Lab Results - Last 24 hrs: 


                         Laboratory Results - last 24 hr











  02/22/21 02/22/21 Range/Units





  08:35 08:35 


 


WBC  3.2   (3.0-10.3)  x10-3/uL


 


Corrected WBC  3.2 L   (4.5-12.0)  X10(3)


 


RBC  2.80 L   (3.60-5.20)  x10(6)uL


 


Hgb  8.5 L   (11.4-15.5)  g/dL


 


Hct  25.6 L   (34.2-48.2)  %


 


MCV  91.4   (76.7-100.5)  fL


 


MCH  30.4   (23.9-33.9)  pg


 


MCHC  33.2   (31.9-34.8)  g/dL


 


RDW  15.6   (12.3-16.5)  %


 


Plt Count  61 L   (151-488)  x10(3)uL


 


MPV  9.3   (7.1-12.4)  fL


 


Add Manual Diff  Yes   


 


Neutrophils % (Manual)  69   (46-82)  %


 


Band Neutrophils %  2   (0-6)  %


 


Lymphocytes % (Manual)  6 L   (13-37)  %


 


Monocytes % (Manual)  12   (4-12)  %


 


Eosinophils % (Manual)  4   (0-5)  %


 


Basophils % (Manual)  1   (0-2)  %


 


Metamyelocytes %  3 H   (0-0)  %


 


Myelocytes %  3 H   (0-0)  %


 


Nucleated RBCs  1 H   (0-0)  /100WBC


 


Toxic Granulation  Occasional   (NOT SEEN)  


 


Dohle Bodies     


 


Polychromasia  Moderate H   


 


Poikilocytosis  Moderate H   


 


Anisocytosis  Moderate H   


 


Sodium   140  (135-145)  mmol/L


 


Potassium   3.3 L  (3.5-5.3)  mmol/L


 


Chloride   109  (100-110)  mmol/L


 


Carbon Dioxide   22  (21-32)  mmol/L


 


BUN   14  (7-18)  mg/dL


 


Creatinine   0.7  (0.55-1.02)  mg/dL


 


Est Cr Clr Drug Dosing   51.41  mL/min


 


Estimated GFR (MDRD)   > 60  (>60)  


 


BUN/Creatinine Ratio   20.0  (9-20)  


 


Glucose   104  ()  mg/dL


 


Calcium   8.3 L  (8.6-10.2)  mg/dL











KIMBER Results - Last 24 hrs: 


                                  Microbiology











 02/13/21 08:50 Salmonella/Shigella Screen - Final





 Stool / Feces Campylobacter Culture - Final





 Escherichia coli Shiga Toxins EIA - Final











Med Orders - Current: 


                               Current Medications





Acetaminophen (Tylenol Extra Strength)  500 mg PO Q6H [facility]


   Last Admin: 02/22/21 11:01 Dose:  500 mg


   Documented by: 


Albuterol (Proventil Neb Soln)  2.5 mg NEB Q2H PRN


   PRN Reason: Dyspnea


   Last Admin: 02/15/21 01:12 Dose:  2.5 mg


   Documented by: 


Albuterol/Ipratropium (Duoneb 3.0-0.5 Mg/3 Ml)  3 ml INH 0700,1100,1500,1900 [facility]


   Last Admin: 02/22/21 11:01 Dose:  3 ml


   Documented by: 


Allopurinol (Zyloprim)  100 mg PO DAILY [facility]


   Last Admin: 02/22/21 08:16 Dose:  100 mg


   Documented by: 


Aspirin (Halfprin)  81 mg PO DAILY [facility]


   Last Admin: 02/22/21 08:17 Dose:  81 mg


   Documented by: 


Budesonide (Pulmicort)  0.5 mg NEB BID@0700,1900 [facility]


   Last Admin: 02/22/21 06:23 Dose:  0.5 mg


   Documented by: 


Calcium Carbonate/Glycine (Oyster Shell Calcium)  500 mg PO BID [facility]


   Last Admin: 02/22/21 08:16 Dose:  500 mg


   Documented by: 


Cefepime HCl (Maxipime)  2 gm IVPUSH Q12H [facility]


   Stop: 02/22/21 20:00


   Last Admin: 02/22/21 05:25 Dose:  2 gm


   Documented by: 


Al Hydroxide/Mg Hydroxide 30 ml/ Lidocaine HCl 30 ml/Diphenhydramine HCl 75 

mg/Nystatin 30 ml  0 ml PO QID [facility]


   Last Admin: 02/22/21 08:17 Dose:  5 ml


   Documented by: 


Diazepam (Valium)  2 mg PO TID PRN


   PRN Reason: Muscle Spasm


   Last Admin: 02/21/21 20:06 Dose:  2 mg


   Documented by: 


Ferrous Sulfate (Ferrous Sulfate)  325 mg PO DAILY [facility]


   Last Admin: 02/22/21 08:16 Dose:  325 mg


   Documented by: 


Guaifenesin (Mucinex)  600 mg PO BID [facility]


   Last Admin: 02/22/21 11:01 Dose:  600 mg


   Documented by: 


Sodium Chloride (Normal Saline)  250 mls @ 100 mls/hr IV ASDIRECTED [facility]


Vancomycin HCl 1.25 gm/ Sodium (Chloride)  250 mls @ 200 mls/hr IV Q24H [facility]


   Stop: 02/22/21 16:00


   Last Admin: 02/21/21 13:51 Dose:  200 mls/hr


   Documented by: 


Sodium Chloride (Normal Saline)  250 mls @ 100 mls/hr IV ASDIRECTED [facility]


Levothyroxine Sodium (Synthroid)  50 mcg PO ACBREAKFAST [facility]


   Last Admin: 02/22/21 06:30 Dose:  50 mcg


   Documented by: 


Melatonin (Melatonin)  6 mg PO BEDTIME PRN


   PRN Reason: Insomnia


   Last Admin: 02/21/21 20:06 Dose:  6 mg


   Documented by: 


Mirtazapine (Remeron)  15 mg PO BEDTIME [facility]


Montelukast Sodium (Singulair)  10 mg PO BEDTIME [facility]


   Last Admin: 02/21/21 20:06 Dose:  10 mg


   Documented by: 


Ondansetron HCl (Zofran Odt)  4 mg PO Q6H PRN


   PRN Reason: Nausea/Vomiting


   Last Admin: 02/20/21 08:14 Dose:  4 mg


   Documented by: 


Pantoprazole Sodium (Protonix***)  40 mg PO ACBREAKFAST [facility]


   Last Admin: 02/22/21 06:30 Dose:  40 mg


   Documented by: 


Polyethylene Glycol (Miralax)  17 gm PO DAILY PRN


   PRN Reason: CONSTIPATION


Saccharomyces Boulardii (Florastor)  250 mg PO BID [facility]


   Last Admin: 02/22/21 08:16 Dose:  250 mg


   Documented by: 


Sodium Chloride (Saline Flush)  10 ml FLUSH ASDIRECTED PRN


   PRN Reason: Keep Vein Open


   Last Admin: 02/22/21 05:30 Dose:  10 ml


   Documented by: 


Tramadol HCl (Ultram)  50 mg PO DAILY@0800 [facility]


   Last Admin: 02/22/21 08:20 Dose:  50 mg


   Documented by: 


Tramadol HCl (Ultram)  50 mg PO Q6H PRN


   PRN Reason: Pain (4-6/10)


   Last Admin: 02/16/21 21:32 Dose:  50 mg


   Documented by: 


Trimethoprim/Sulfamethoxazole (Septra Ds)  1 tab PO MoWeFr@0900 [facility]


   Last Admin: 02/22/21 08:17 Dose:  1 tab


   Documented by: 


Valacyclovir HCl (Valtrex)  500 mg PO DAILY [facility]


   Last Admin: 02/22/21 08:16 Dose:  500 mg


   Documented by: 


Vancomycin HCl (Pharmacy To Dose - Vancomycin)  1 dose .XX ASDIRECTED [facility]





Discontinued Medications





Acetaminophen (Tylenol Extra Strength)  500 mg PO Q6H [facility]


   Last Admin: 02/13/21 16:00 Dose:  Not Given


   Documented by: 


Albuterol/Ipratropium (Duoneb 3.0-0.5 Mg/3 Ml)  3 ml INH QIDRT [facility]


   Last Admin: 02/13/21 16:00 Dose:  Not Given


   Documented by: 


Budesonide (Pulmicort)  0.5 mg NEB BIDRT [facility]


Cefepime HCl (Maxipime)  2 gm IVPUSH Q8H [facility]


   Last Admin: 02/17/21 05:30 Dose:  2 gm


   Documented by: 


Dexamethasone (Dexamethasone)  40 mg PO Q7D [facility]


Filgrastim-Sndz (Zarxio)  480 mcg SUBCUT Q24H [facility]


   Last Admin: 02/16/21 11:46 Dose:  480 mcg


   Documented by: 


Furosemide (Lasix)  40 mg IVPUSH NOW STA


   Stop: 02/14/21 10:30


   Last Admin: 02/14/21 14:49 Dose:  Not Given


   Documented by: 


Furosemide (Lasix)  20 mg IVPUSH NOW ONE


   Stop: 02/14/21 12:21


   Last Admin: 02/14/21 14:22 Dose:  20 mg


   Documented by: 


Furosemide (Lasix)  20 mg IVPUSH BTNUNITS ONE


   Stop: 02/14/21 12:21


   Last Admin: 02/14/21 14:41 Dose:  20 mg


   Documented by: 


Sodium Chloride (Normal Saline)  250 mls @ 100 mls/hr IV ASDIRECTED [facility]


Sodium Chloride (Normal Saline)  1,000 mls @ 100 mls/hr IV ASDIRECTED [facility]


   Last Admin: 02/13/21 23:55 Dose:  100 mls/hr


   Documented by: 


Vancomycin HCl 2 gm/ Premix  400 mls @ 200 mls/hr IV ONETIME ONE


   Stop: 02/13/21 14:59


   Last Admin: 02/13/21 13:38 Dose:  200 mls/hr


   Documented by: 


Vancomycin HCl 1 gm/ Premix  200 mls @ 200 mls/hr IV Q24H [facility]


   Last Admin: 02/15/21 15:00 Dose:  200 mls/hr


   Documented by: 


Ibuprofen (Motrin)  200 mg PO Q6H [facility]


   Last Admin: 02/13/21 15:59 Dose:  Not Given


   Documented by: 


Ibuprofen (Motrin)  200 mg PO Q6H [facility]


   Last Admin: 02/22/21 05:19 Dose:  200 mg


   Documented by: 


Revlimid 25 Mg (Capsule *Pt Own Med*)  1 each PO DAILY [facility]


   Stop: 02/22/21 09:01


   Last Admin: 02/14/21 08:39 Dose:  1 each


   Documented by: 


Pantoprazole Sodium (Protonix***)  40 mg PO DAILY PRN


   PRN Reason: Dyspepsia


Sodium Chloride (Saline Flush)  10 ml FLUSH ASDIRECTED PRN


   PRN Reason: Keep Vein Open











- Exam


General: Reports: Alert, Oriented, Cooperative, No Acute Distress


Neck: Reports: Trachea Midline


Lungs: Reports: Clear to Auscultation, Normal Respiratory Effort, Wheezing (LLL,

 rare).  Denies: Crackles


Cardiovascular: Reports: Regular Rate, Regular Rhythm


GI/Abdominal Exam: Normal Bowel Sounds, Soft, Non-Tender, No Distention


Extremities: Pedal Edema (2+ BLE, no erythema or TTP)





*Q Meaningful Use (DIS)





- VTE *Q


VTE Mechanical Contraindications *Q: At Risk for Falls


VTE Pharmacological Contraindications *Q: Bld Coagulation Disorder

## 2021-02-23 NOTE — PCM.SN.2
- Free Text/Narrative


Note: 





Date: 2/23/21


Melinda returned from ortho & oncology appointments today: 


new orders from ortho: continue PT, arm sling for support, ROM shoulder as 

tolerated. Follow up in 2 months. 


new orders from oncology: she is to have bone marrow biopsy, oncology is setting

this up and plan to resume Revlimid at 12.5 mg dose on 3/9.

## 2021-02-26 NOTE — PN
DATE SEEN:  02/26/2021

 

HISTORY:  Melinda Triana is a delightful 75-year-old  female presently

in swing bed.

 

Has an appointment today for bone marrow.

 

Had a recent complicated hip fracture, and a pathological fracture.

 

Has undergone initial treatment for multiple myeloma, surgical repair of her

left hip, and acute care stay for sepsis, has been back in swing bed since the

22nd.  Was seen briefly before her return to [facility] in [place] for a bone

marrow.

 

Quite concerned and anxious about the bone marrow.  I reviewed her medications

and are appropriate.

 

PHYSICAL EXAMINATION:  VITAL SIGNS:  36.4, 74, 113/61, 16, 99.  GENERAL:

Comfortable, appropriate, in good spirits.  NECK:  Benign, thyroid is small.

CHEST:  Clear in all lung fields.  HEART:  No ectopy or murmur.  ABDOMEN:

Benign.

 

ASSESSMENT:  Left hip fracture, pathological fracture, wide-spread multiple

myeloma.

 

PLAN:  Medications, care, and treatment on board.  The pain appears to be

controlled.  Intervention as appropriate.

 

Job#: [id number]

DD: 02/26/2021 1030

DT: 02/26/2021 1817 MARICARMEN/TARAH

## 2021-02-27 NOTE — PN
DATE SEEN:  02/27/2021

 

SUBJECTIVE:  Melinda Triana is a 75-year-old  female in swing bed.

 

She had a complicated left hip fracture repair and a pathologic fracture of left

humerus.

 

Diagnosis of multiple myeloma.

 

Underwent a bone marrow yesterday.  Results pending.

 

Hemoglobin fell from 8.2 to 7.8.  Had been given 2 units of blood during her

hospital stay.

 

Up, doing well, walking with a walker.  Pain was controlled.

 

Bone marrow went quite well.

 

MEDICATIONS:  Reviewed and appropriate.

 

OBJECTIVE:  VITAL SIGNS: 36.4, 82, 107/60, 18, 99% on room air.  GENERAL:  In

good spirits today.  NECK:  Benign.  Thyroid small.  CHEST:  Clear in all lung

fields.  HEART:  No ectopy or murmur.  ABDOMEN:  Benign.  Surgical wound healing

well.

 

ASSESSMENT:

1. Left hip fracture.

2. Pathologic humeral fracture.

3. Diagnosis of multiple myeloma.

 

PLAN:  Intervention and treatment pending outcome of bone marrow, appears in

good spirits.  Pain appears to be controlled, we will recheck hemoglobin on

Monday.

 

Job#: [id number]

DD: 02/27/2021 1028

DT: 02/27/2021 1056 /TARAH

## 2021-02-28 NOTE — PN
DATE SEEN:  02/28/2021

 

Melinda Triana is a 75-year-old  female in swing bed.

 

Had a complicated left hip fracture, found to have pathologic fracture and

diagnosis of multiple myeloma.

 

Underwent a bone marrow yesterday at [facility].  Doing well.  The event

went without great difficulty.

 

LABORATORY STUDIES:  Hemoglobin fell from 8.21 on 02/23/2021 to 7.8.  White

count had been 2700, we will recheck a CBC tomorrow, 03/01/2021.

 

Otherwise, doing well.  Little pain at the site of the bone marrow biopsy.

 

Pain controlled.  Doing well.  Anxious for sometime away.

 

OBJECTIVE:  VITAL SIGNS:  36.6, 79, 107/54, 18, 99%.  GENERAL:  In good spirits.

NECK:  Benign.  Thyroid small.  CHEST:  Clear in all lung fields.  HEART:  No

ectopy or murmur.  ABDOMEN:  Benign.

 

IMPRESSION:  Left hip fracture, pathological fracture of left humerus, multiple

myeloma.

 

PLAN:  Hemoglobin stable we will recheck on Monday, pain appears to be a

nonissue.

 

Job#: [id number]

DD: 02/28/2021 1034

DT: 02/28/2021 1244 MARICARMEN/TARAH

## 2021-03-01 NOTE — PN
DATE SEEN:  03/01/2021

 

SUBJECTIVE:  Melinda Triana is a delightful 75-year-old  female seen

today for review.  Swing bed care.  She had a surgical pathological left hip

fracture and a left humeral fracture.

 

Diagnosis of multiple myeloma.  Bone marrow done at [facility] in [place] pending.

 

Doing well.  Pain appears to be controlled.  No complicating health issues.

 

OBJECTIVE:  VITAL SIGNS:  36.7, 79, 107/61, 18, and 97%.  GENERAL:  In good

spirits.  NECK:  Benign.  Thyroid small.  CHEST:  Clear in all lung fields.

HEART:  No ectopy or murmur.  ABDOMEN:  Benign.  EXTREMITIES:  Left surgical hip

wound intact.

 

LABORATORY STUDIES:  Hemoglobin 8.2, 7.8, 8.4 of 03/01/2021.

 

ASSESSMENT:  Ongoing care of left hip fracture, surgical repair, pathologic

fracture and myeloma.

 

PLAN:  Medications, care, and treatment appropriate.  Upcoming home visit

planned.

 

Job#: [id number]

DD: 03/01/2021 0947

DT: 03/01/2021 1119 MARICARMEN/TARAH

## 2021-03-02 NOTE — PN
DATE SEEN:  03/02/2021

 

SUBJECTIVE:  Melinda Triana is a delightful 75-year-old  female

in swing bed.

 

Doing well.  Bone marrow early in the week.

 

Pain is well controlled.  Ambulating with greater success with a one-sided

walker.  Anxious to go home. __________ upcoming this week.

 

LABORATORY STUDIES:  Hemoglobin has wandered from 8.2 to 7.8, 8.4, today 7.6.

White count 2800, platelets 217,000.

 

OBJECTIVE:  VITAL SIGNS:  Documented.  GENERAL:  Appears comfortable.  NECK:

Benign.  Thyroid small.  CHEST:  Clear in all lung fields.  HEART:  No ectopy or

murmur.  ABDOMEN:  Benign.  Surgical wound, left hip, intact.

 

ASSESSMENT:  Left hip fracture, pathologic; right humerus fracture, pathologic;

multiple myeloma; low white count.

 

PLAN:  Medications, care, and treatment appropriate.  Pain is controlled, has

upcoming visits.  We will follow her hemoglobin closely.

 

Job#: [id number]

DD: 03/02/2021 0914

DT: 03/02/2021 1113 MARICARMEN/TARAH

## 2021-03-03 NOTE — PN
DATE SEEN:  03/03/2021

 

SUBJECTIVE:  Melinda Triana is a delightful 75-year-old  female in

swing bed.

 

Had a recent occult left hip fracture, likely pathologic, and findings of a left

humeral pathologic nonfracture.

 

Had a bone marrow done earlier this week, uncertain of outcome.

 

Doing well.  Ambulating with good success.  Limited weightbearing on the left,

doing well with a walker or cane.  Pain is controlled.  Hemoglobin has risen to

8.1.

 

OBJECTIVE:  VITAL SIGNS:  36.3, 79, 107/65, 16, and 97%.  GENERAL:  In good

spirits as always.  NECK:  Benign.  Thyroid small.  CHEST:  On auscultation,

clear in all lung fields.  HEART:  No ectopy or significant murmur.

EXTREMITIES:  Wound healing well, left hip.

 

ASSESSMENT:  Left hip fracture, pathologic; lesion, left arm; multiple myeloma.

 

PLAN:  Planning home visit on Friday, discharge when appropriate.

 

Job#: [id number]

DD: 03/03/2021 1010

DT: 03/03/2021 1207 MARICARMEN/TARAH

## 2021-03-04 NOTE — PN
DATE SEEN:  03/04/2021

 

SUBJECTIVE:  Melinda Triana is a delightful 75-year-old female in swing bed.

Had a complicated pathological left hip fracture, pathologic nonsurgical humerus

lesion, likely consistent with multiple myeloma.

 

Bone marrow done earlier at [facility] in [place] pending.

 

Pain appears to be well controlled.

 

Planning home visit tomorrow and discharge accordingly.  She voices no other

particular complaints or concerns.

 

LABORATORY STUDIES:  Hemoglobin 7.8, 8.4, 7.6, 8.1 on 03/03/2021.

 

OBJECTIVE:  VITAL SIGNS:  36.3, 86, 120/64, 16, and 99%.  GENERAL:  Always in

good spirits.  NECK:  Benign.  Thyroid small.  CHEST:  Clear in all lung fields.

HEART:  No ectopy or murmur.  ABDOMEN:  Benign.  Surgical wound hip site healing

without conflict.

 

ASSESSMENT:  Left hip fracture, pathological lesion left humerus, diagnosis of

multiple myeloma.

 

PLAN:  Medications, care, and treatment on board, likely short-term stay to

follow.

 

Job#: [id number]

DD: 03/04/2021 1022

DT: 03/04/2021 1050 /TARAH

## 2021-03-05 NOTE — PCM.PN
- General Info


Date of Service: 03/05/21


Admission Dx/Problem (Free Text): 


                           Admission Diagnosis/Problem





Admission Diagnosis/Problem      Multiple myeloma, pathologic fractures 

secondary to multiple myeloma, weakness








Subjective Update: 


Mrs. Triana is a very pleasant 75-year-old lady.  She is in very good spirits

today.  She will have a day pass to go home and is looking forward to seeing her

new apartment very first time.  She states that she feels very well and has no 

new complaints today.  She states that she does have some mild concern over her 

ability to ambulate and carry on activities of daily living without assistance 

but she is domestic that she will be able.





Functional Status: Reports: Pain Controlled, Tolerating Diet, Ambulating, 

Urinating





- Review of Systems


General: Reports: Weakness, Fatigue


HEENT: Reports: No Symptoms


Pulmonary: Reports: No Symptoms


Cardiovascular: Reports: No Symptoms


Gastrointestinal: Reports: No Symptoms


Genitourinary: Reports: No Symptoms


Musculoskeletal: Reports: Shoulder Pain, Joint Pain, Joint Swelling


Skin: Reports: No Symptoms


Neurological: Reports: No Symptoms


Psychiatric: Reports: No Symptoms





- Patient Data


Vitals - Most Recent: 


                                Last Vital Signs











Temp  36.2 C   03/05/21 08:28


 


Pulse  91   03/05/21 08:28


 


Resp  18   03/05/21 08:28


 


BP  114/62   03/05/21 08:28


 


Pulse Ox  98   03/05/21 08:28











Weight - Most Recent: 79.946 kg


Med Orders - Current: 


                               Current Medications





Acetaminophen (Tylenol Extra Strength)  500 mg PO Q6H [facility]


   Last Admin: 03/05/21 08:20 Dose:  500 mg


   Documented by: 


Albuterol/Ipratropium (Duoneb 3.0-0.5 Mg/3 Ml)  3 ml INH 0700,1100,1500,1900 [facility]


   Last Admin: 03/05/21 06:33 Dose:  3 ml


   Documented by: 


Allopurinol (Zyloprim)  100 mg PO DAILY [facility]


   Last Admin: 03/05/21 08:22 Dose:  100 mg


   Documented by: 


Aspirin (Halfprin)  81 mg PO DAILY [facility]


   Last Admin: 03/05/21 08:21 Dose:  81 mg


   Documented by: 


Budesonide (Pulmicort)  0.5 mg NEB BID@0700,1900 [facility]


   Last Admin: 03/05/21 06:33 Dose:  0.5 mg


   Documented by: 


Calcium Carbonate/Glycine (Oyster Shell Calcium)  500 mg PO BID [facility]


   Last Admin: 03/05/21 08:22 Dose:  500 mg


   Documented by: 


Diazepam (Valium)  2 mg PO TID PRN


   PRN Reason: Muscle Spasm


   Last Admin: 02/28/21 20:00 Dose:  2 mg


   Documented by: 


Ferrous Sulfate (Ferrous Sulfate)  325 mg PO DAILY [facility]


   Last Admin: 03/05/21 08:21 Dose:  325 mg


   Documented by: 


Guaifenesin (Mucinex)  600 mg PO BID [facility]


   Last Admin: 03/05/21 08:22 Dose:  600 mg


   Documented by: 


Levothyroxine Sodium (Synthroid)  50 mcg PO DAILY@0600 [facility]


   Last Admin: 03/05/21 06:07 Dose:  50 mcg


   Documented by: 


Melatonin (Melatonin)  6 mg PO BEDTIME PRN


   PRN Reason: Insomnia


   Last Admin: 02/28/21 20:00 Dose:  6 mg


   Documented by: 


Mirtazapine (Remeron)  15 mg PO BEDTIME [facility]


   Last Admin: 03/04/21 21:11 Dose:  15 mg


   Documented by: 


Montelukast Sodium (Singulair)  10 mg PO BEDTIME [facility]


   Last Admin: 03/04/21 21:11 Dose:  10 mg


   Documented by: 


Ondansetron HCl (Zofran Odt)  4 mg PO Q6H PRN


   PRN Reason: Nausea/Vomiting


Pantoprazole Sodium (Protonix***)  40 mg PO DAILY@0600 [facility]


   Last Admin: 03/05/21 06:07 Dose:  40 mg


   Documented by: 


Polyethylene Glycol (Miralax)  17 gm PO DAILY PRN


   PRN Reason: CONSTIPATION


Potassium Chloride (Klor-Con M20)  20 meq PO DAILY [facility]


   Last Admin: 03/05/21 08:22 Dose:  20 meq


   Documented by: 


Tramadol HCl (Ultram)  50 mg PO Q6H PRN


   PRN Reason: Pain (4-6/10)


   Last Admin: 03/02/21 20:12 Dose:  50 mg


   Documented by: 


Trimethoprim/Sulfamethoxazole (Septra Ds)  1 tab PO MoWeFr@0900 [facility]


   Last Admin: 03/05/21 08:22 Dose:  1 tab


   Documented by: 


Valacyclovir HCl (Valtrex)  500 mg PO DAILY [facility]


   Last Admin: 03/05/21 08:22 Dose:  500 mg


   Documented by: 





Discontinued Medications





Cefepime HCl (Maxipime)  2 gm IVPUSH ONETIME ONE


   Stop: 02/22/21 17:01


   Last Admin: 02/22/21 18:12 Dose:  2 gm


   Documented by: 


Al Hydroxide/Mg Hydroxide 30 ml/ Lidocaine HCl 30 ml/Diphenhydramine HCl 75 mg/

Nystatin 30 ml  0 ml PO QID [facility]


   Stop: 02/24/21 14:00


   Last Admin: 02/24/21 13:30 Dose:  5 ml


   Documented by: 


Vancomycin HCl 1.25 gm/ Sodium (Chloride)  250 mls @ 200 mls/hr IV ONETIME ONE


   Stop: 02/22/21 14:44


   Last Admin: 02/22/21 13:42 Dose:  200 mls/hr


   Documented by: 


Potassium Chloride (Klor-Con M20)  20 meq PO DAILY [facility]


   Stop: 02/23/21 18:01


Saccharomyces Boulardii (Florastor)  250 mg PO BID [facility]


   Stop: 02/22/21 23:00


   Last Admin: 02/22/21 20:55 Dose:  250 mg


   Documented by: 


Tramadol HCl (Ultram)  50 mg PO DAILY@0800 [facility]


   Last Admin: 03/05/21 08:27 Dose:  50 mg


   Documented by: 











- Exam


Quality Assessment: Supplemental Oxygen, DVT Prophylaxis


General: Alert, Oriented, Cooperative, No Acute Distress


HEENT: Mucous Membr. Moist/Pink


Lungs: Clear to Auscultation


Cardiovascular: Regular Rate, Regular Rhythm


GI/Abdominal Exam: Normal Bowel Sounds


Extremities: Pedal Edema


Peripheral Pulses: 2+: Radial (L), Radial (R)


Skin: Warm, Dry


Neurological: No New Focal Deficit


Psy/Mental Status: Alert, Normal Affect, Normal Mood





- Patient Data


Result Diagrams: 


                                 03/03/21 06:25





                                 03/02/21 06:02





Sepsis Event Note





- Evaluation


Sepsis Screening Result: No Definite Risk





- Focused Exam


Vital Signs: 


                                   Vital Signs











  Temp Pulse Resp BP Pulse Ox


 


 03/05/21 08:28  36.2 C  91  18  114/62  98


 


 03/05/21 06:50   84   














- Problem List & Annotations


(1) Sciatica


SNOMED Code(s): 13144616


   Code(s): M54.30 - SCIATICA, UNSPECIFIED SIDE   Status: Chronic   Current 

Visit: No   


Qualifiers: 


 





(2) Intertrochanteric fracture, hip


SNOMED Code(s): 716110689


   Code(s): S72.143A - DISPLACED INTERTROCHANTERIC FRACTURE OF UNSP FEMUR, INIT 

 Status: Acute   Current Visit: No   Onset Date: ~01/08/21   





(3) Closed fracture of left proximal humerus


SNOMED Code(s): 88667128


   Code(s): S42.202A - UNSP FRACTURE OF UPPER END OF LEFT HUMERUS, INIT FOR CLOS

FX   Status: Acute   Current Visit: No   Onset Date: ~01/08/21   





(4) Esophageal reflux


SNOMED Code(s): 498581175


   Code(s): K21.9 - GASTRO-ESOPHAGEAL REFLUX DISEASE WITHOUT ESOPHAGITIS   

Status: Chronic   Current Visit: No   





(5) Asthma, moderate persistent


SNOMED Code(s): 151292028


   Code(s): J45.40 - MODERATE PERSISTENT ASTHMA, UNCOMPLICATED   Status: Chronic

  Current Visit: No   





(6) Hypothyroidism


SNOMED Code(s): 88470264


   Code(s): E03.9 - HYPOTHYROIDISM, UNSPECIFIED   Status: Chronic   Current 

Visit: No   





(7) Obesity (BMI 35.0-39.9 without comorbidity)


SNOMED Code(s): 874776403, 969351673


   Code(s): E66.9 - OBESITY, UNSPECIFIED   Status: Chronic   Current Visit: No  







(8) Pathological fracture due to neoplastic disease


SNOMED Code(s): 632771902


   Code(s): M84.50XA - PATHOLOGICAL FRACTURE IN NEOPLASTIC DISEASE, UNSP SITE, 

INIT   Status: Acute   Current Visit: No   


Qualifiers: 


   Site of pathological fracture: hip 





(9) Multiple myeloma


SNOMED Code(s): 623871119


   Code(s): C90.00 - MULTIPLE MYELOMA NOT HAVING ACHIEVED REMISSION   Status: 

Acute   Current Visit: No   Onset Date: ~01/20/21   


Qualifiers: 


   Multiple myeloma remission status: not in remission   Qualified Code(s): 

C90.00 - Multiple myeloma not having achieved remission   





(10) Anemia of chronic disease


SNOMED Code(s): 161674429


   Code(s): D63.8 - ANEMIA IN OTHER CHRONIC DISEASES CLASSIFIED ELSEWHERE   

Status: Acute   Current Visit: No   





(11) Hypotensive episode


SNOMED Code(s): 60855451


   Code(s): I95.9 - HYPOTENSION, UNSPECIFIED   Status: Resolved   Current Visit:

No   





(12) Diarrhea


SNOMED Code(s): 14436601


   Code(s): R19.7 - DIARRHEA, UNSPECIFIED   Status: Resolved   Current Visit: No

  





(13) Neutropenic fever


SNOMED Code(s): 544917147


   Code(s): D70.9 - NEUTROPENIA, UNSPECIFIED; R50.81 - FEVER PRESENTING WITH 

CONDITIONS CLASSIFIED ELSEWHERE   Status: Resolved   Current Visit: No   





(14) Pancytopenia due to chemotherapy


SNOMED Code(s): 9454708, 547440432


   Code(s): D61.810 - ANTINEOPLASTIC CHEMOTHERAPY INDUCED PANCYTOPENIA   Status:

Acute   Current Visit: No   Annotation/Comment:: improving.    





(15) Oral pharyngeal candidiasis


SNOMED Code(s): 11381663


   Code(s): B37.0 - CANDIDAL STOMATITIS   Status: Resolved   Current Visit: No  

Annotation/Comment:: improving   





(16) Weakness


SNOMED Code(s): 16624517


   Code(s): R53.1 - WEAKNESS   Status: Acute   Current Visit: Yes   





- Problem List Review


Problem List Initiated/Reviewed/Updated: Yes





- Plan


Plan:: 


Continue to monitor pancytopenia and treat as indicated.  Continue treatment for

multiple myeloma and follow-up with specialist.  Patient will have day pass to 

go home today and will continue with physical and Occupational Therapy.  Patient

is excited for discharge to home early next week.  I explained to the patient 

that we will need to make sure that she will be safe while attending to act

ivities of daily living at home.  She expressed understanding and agreement.

## 2021-03-08 NOTE — [id number]
**Discharge Summary





- Hospital Course


Free Text/Narrative:: 


75-year-old lady with multiple myeloma suffered pathological fractures and was 

admitted here for rehabilitation.  She has been treated with occupational and 

physical therapy, monitored and treated for anemia of chronic disease and 

secondary to chemotherapy, prophylactic antibiotics, pain, and insomnia.  

Patient has improved significantly and has been qualified by occupational 

physical therapy for discharge to home with continued occupational and physical 

therapy.  Patient will be evaluated to determine if she needs assistance with 

bathing.  Patient will have home health/nursing. She is encouraged to follow-up 

with her primary care physician and her oncologist.





Diagnosis: Stroke: No





- Discharge Data


Discharge Date: 03/08/21


Discharge Disposition: Home, [facility]


Condition: Good





- Referral to Home Health


Date of Face to Face Encounter: 03/08/21


Reason for Homebound Status: Weakness secondary to orthopedic injury from 

multiple myeloma


Primary Care Physician: 


Duane Strand, MD





Skilled Need: Nursing, physical therapy, Occupational Therapy





- Discharge Diagnosis/Problem(s)


(1) Sciatica


SNOMED Code(s): 87719948


   ICD Code: M54.30 - SCIATICA, UNSPECIFIED SIDE   Status: Chronic   Current 

Visit: No   


Qualifiers: 


 





(2) Intertrochanteric fracture, hip


SNOMED Code(s): 110587003


   ICD Code: S72.143A - DISPLACED INTERTROCHANTERIC FRACTURE OF UNSP FEMUR, INIT

  Status: Acute   Current Visit: No   Onset Date: ~01/08/21   





(3) Closed fracture of left proximal humerus


SNOMED Code(s): 63739876


   ICD Code: S42.202A - UNSP FRACTURE OF UPPER END OF LEFT HUMERUS, INIT FOR 

CLOS FX   Status: Acute   Current Visit: No   Onset Date: ~01/08/21   





(4) Esophageal reflux


SNOMED Code(s): 776360608


   ICD Code: K21.9 - GASTRO-ESOPHAGEAL REFLUX DISEASE WITHOUT ESOPHAGITIS   

Status: Chronic   Current Visit: No   





(5) Asthma, moderate persistent


SNOMED Code(s): 370035045


   ICD Code: J45.40 - MODERATE PERSISTENT ASTHMA, UNCOMPLICATED   Status: 

Chronic   Current Visit: No   





(6) Hypothyroidism


SNOMED Code(s): 81001438


   ICD Code: E03.9 - HYPOTHYROIDISM, UNSPECIFIED   Status: Chronic   Current 

Visit: No   





(7) Obesity (BMI 35.0-39.9 without comorbidity)


SNOMED Code(s): 190685618, 668840693


   ICD Code: E66.9 - OBESITY, UNSPECIFIED   Status: Chronic   Current Visit: No 

 





(8) Pathological fracture due to neoplastic disease


SNOMED Code(s): 897993809


   ICD Code: M84.50XA - PATHOLOGICAL FRACTURE IN NEOPLASTIC DISEASE, UNSP SITE, 

INIT   Status: Acute   Current Visit: No   


Qualifiers: 


   Site of pathological fracture: hip 





(9) Multiple myeloma


SNOMED Code(s): 702195754


   ICD Code: C90.00 - MULTIPLE MYELOMA NOT HAVING ACHIEVED REMISSION   Status: 

Acute   Current Visit: No   Onset Date: ~01/20/21   


Qualifiers: 


   Multiple myeloma remission status: not in remission   Qualified Code(s): 

C90.00 - Multiple myeloma not having achieved remission   





(10) Anemia of chronic disease


SNOMED Code(s): 344434972


   ICD Code: D63.8 - ANEMIA IN OTHER CHRONIC DISEASES CLASSIFIED ELSEWHERE   

Status: Acute   Current Visit: No   





(11) Pancytopenia due to chemotherapy


SNOMED Code(s): 3064964, 018440940


   ICD Code: D61.810 - ANTINEOPLASTIC CHEMOTHERAPY INDUCED PANCYTOPENIA   

Status: Acute   Current Visit: No   Problem Details: improving.    





(12) Weakness


SNOMED Code(s): 57041634


   ICD Code: R53.1 - WEAKNESS   Status: Acute   Current Visit: Yes   





(13) Insomnia


SNOMED Code(s): 608411545


   ICD Code: G47.00 - INSOMNIA, UNSPECIFIED   Status: Acute   Current Visit: Yes

  





- Patient Summary/Data


Consults: 


                                  Consultations





02/22/21 12:56


OT Evaluation and Treatment [CONS] Routine 


   Please Evaluate and Treat.


   OT Reason for Consult: ADL's


   This query below is only for informational purposes and is not editable.


PT Evaluation and Treatment [CONS] Routine 


   Please Evaluate and Treat.


   PT Reason for Consult: Ambulation


   This query below is only for informational purposes and is not editable.














- Patient Instructions


Diet: Heart Healthy Diet


Activity: As Tolerated


Driving: Do Not Drive





- Discharge Plan


*PRESCRIPTION DRUG MONITORING PROGRAM REVIEWED*: Not Applicable


*COPY OF PRESCRIPTION DRUG MONITORING REPORT IN PATIENT ALEK: Not Applicable


Prescriptions/Med Rec: 


dexAMETHasone [Dexamethasone] 40 mg PO Q7D #30 tablet


Ferrous Sulfate 325 mg PO DAILY #30


Aspirin [Halfprin] 81 mg PO DAILY #30 tab.ec


Potassium Chloride [Klor-Con M20] 20 meq PO DAILY #30 tab.er


polyethylene glycoL 3350 [MiraLAX] 17 gm PO DAILY PRN #1 bottle


 PRN Reason: CONSTIPATION


Mirtazapine [Remeron] 15 mg PO BEDTIME #30 tablet


Sulfamethoxazole/Trimethoprim [Septra DS] 1 tab PO MoWeFr@0900 #12 tablet


traMADol [Ultram] 50 mg PO Q6H PRN #10 tablet


 PRN Reason: Pain (4-6/10)


valACYclovir HCl [Valtrex] 500 mg PO DAILY 30 Days #30 tablet


Ondansetron [Zofran ODT] 4 mg PO Q6H PRN #20 tab.dis


 PRN Reason: Nausea/Vomiting


allopurinoL [Zyloprim] 100 mg PO DAILY #30 tablet


Home Medications: 


                                    Home Meds





Albuterol/Ipratropium [DuoNeb 3.0-0.5 MG/3 ML] 3 ml INH QID 03/31/17 [History]


Budesonide [Pulmicort] 0.5 mg IH BID 03/31/17 [History]


Levothyroxine Sodium 50 mcg PO DAILY 03/31/17 [History]


Montelukast [Singulair] 10 mg PO BEDTIME 03/31/17 [History]


Omeprazole Magnesium [Prilosec Otc] 20 mg PO DAILY PRN 03/31/17 [History]


Calcium Carbonate/Vitamin D3 [Calcium 600-Vit D3 200 Tablet] 1 tab PO BID 01/12/ 21 [History]


Acetaminophen [Tylenol Extra Strength] 500 mg PO Q6H  tablet 02/13/21 [Rx]


Aspirin [Halfprin] 81 mg PO DAILY #30 tab.ec 03/08/21 [Rx]


Ferrous Sulfate 325 mg PO DAILY #30 03/08/21 [Rx]


Mirtazapine [Remeron] 15 mg PO BEDTIME #30 tablet 03/08/21 [Rx]


Ondansetron [Zofran ODT] 4 mg PO Q6H PRN #20 tab.dis 03/08/21 [Rx]


Potassium Chloride [Klor-Con M20] 20 meq PO DAILY #30 tab.er 03/08/21 [Rx]


Sulfamethoxazole/Trimethoprim [Septra DS] 1 tab PO MoWeFr@0900 #12 tablet 

03/08/21 [Rx]


allopurinoL [Zyloprim] 100 mg PO DAILY #30 tablet 03/08/21 [Rx]


dexAMETHasone [Dexamethasone] 40 mg PO Q7D #30 tablet 03/08/21 [Rx]


polyethylene glycoL 3350 [MiraLAX] 17 gm PO DAILY PRN #1 bottle 03/08/21 [Rx]


traMADol [Ultram] 50 mg PO Q6H PRN #10 tablet 03/08/21 [Rx]


valACYclovir HCl [Valtrex] 500 mg PO DAILY 30 Days #30 tablet 03/08/21 [Rx]








Patient Handouts:  Bone Marrow Aspiration and Bone Marrow Biopsy, Adult





- Discharge Summary/Plan Comment


DC Time >30 min.: Yes


Discharge Summary/Plan Comment: 


Discharge patient to home with home health and PT/OT evaluation for continued 

physical/Occupational Therapy and bathing assistance if necessary.  Patient 

should follow-up with her primary care physician in the next 1 to 3 days and 

follow-up with oncology as directed.  She should continue to take medications as

 prescribed.  To the emergency department and/or call your primary care 

physician if you have chest pain, shortness of breath, severe pain, inability to

 complete activities of daily living, nausea, vomiting, diarrhea, used appetite,

 or any other concerns.








- General Info


Date of Service: 03/08/21


Admission Dx/Problem (Free Text: 


                           Admission Diagnosis/Problem





Admission Diagnosis/Problem      Multiple myeloma, pathologic fractures 

secondary to multiple myeloma, weakness








Subjective Update: 


Mrs. Triana is a very pleasant 75-year-old lady.  She is in very good spirits

 today. She states that she feels very well and has no new complaints today.  





Functional Status: Reports: Pain Controlled, Tolerating Diet, Ambulating, 

Urinating, New Symptoms, Incentive Spirometry





- Review of Systems


General: Reports: No Symptoms


HEENT: Reports: No Symptoms


Pulmonary: Reports: No Symptoms


Cardiovascular: Reports: No Symptoms


Gastrointestinal: Reports: No Symptoms


Genitourinary: Reports: No Symptoms


Musculoskeletal: Reports: Shoulder Pain, Joint Pain


Skin: Reports: No Symptoms


Neurological: Reports: No Symptoms


Psychiatric: Reports: No Symptoms





- Patient Data


Vitals - Most Recent: 


                                Last Vital Signs











Temp  36.6 C   03/08/21 07:25


 


Pulse  84   03/08/21 07:25


 


Resp  20   03/08/21 07:25


 


BP  112/72   03/08/21 07:25


 


Pulse Ox  100   03/08/21 07:25











Weight - Most Recent: 80.739 kg


Med Orders - Current: 


                               Current Medications





Acetaminophen (Tylenol Extra Strength)  500 mg PO Q6H [facility]


   Last Admin: 03/08/21 09:16 Dose:  500 mg


   Documented by: 


Albuterol/Ipratropium (Duoneb 3.0-0.5 Mg/3 Ml)  3 ml INH 0700,1100,1500,1900 [facility]


   Last Admin: 03/08/21 06:19 Dose:  3 ml


   Documented by: 


Allopurinol (Zyloprim)  100 mg PO DAILY [facility]


   Last Admin: 03/08/21 09:16 Dose:  100 mg


   Documented by: 


Aspirin (Halfprin)  81 mg PO DAILY [facility]


   Last Admin: 03/08/21 09:16 Dose:  81 mg


   Documented by: 


Budesonide (Pulmicort)  0.5 mg NEB BID@0700,1900 [facility]


   Last Admin: 03/08/21 06:19 Dose:  0.5 mg


   Documented by: 


Calcium Carbonate/Glycine (Oyster Shell Calcium)  500 mg PO BID [facility]


   Last Admin: 03/08/21 09:16 Dose:  500 mg


   Documented by: 


Diazepam (Valium)  2 mg PO TID PRN


   PRN Reason: Muscle Spasm


   Last Admin: 02/28/21 20:00 Dose:  2 mg


   Documented by: 


Ferrous Sulfate (Ferrous Sulfate)  325 mg PO DAILY [facility]


   Last Admin: 03/08/21 09:16 Dose:  325 mg


   Documented by: 


Guaifenesin (Mucinex)  600 mg PO BID [facility]


   Last Admin: 03/08/21 09:16 Dose:  600 mg


   Documented by: 


Levothyroxine Sodium (Synthroid)  50 mcg PO DAILY@0600 [facility]


   Last Admin: 03/08/21 06:19 Dose:  50 mcg


   Documented by: 


Melatonin (Melatonin)  6 mg PO BEDTIME PRN


   PRN Reason: Insomnia


   Last Admin: 02/28/21 20:00 Dose:  6 mg


   Documented by: 


Mirtazapine (Remeron)  15 mg PO BEDTIME [facility]


   Last Admin: 03/07/21 20:03 Dose:  15 mg


   Documented by: 


Montelukast Sodium (Singulair)  10 mg PO BEDTIME [facility]


   Last Admin: 03/07/21 20:03 Dose:  10 mg


   Documented by: 


Ondansetron HCl (Zofran Odt)  4 mg PO Q6H PRN


   PRN Reason: Nausea/Vomiting


Pantoprazole Sodium (Protonix***)  40 mg PO DAILY@0600 [facility]


   Last Admin: 03/08/21 06:19 Dose:  40 mg


   Documented by: 


Polyethylene Glycol (Miralax)  17 gm PO DAILY PRN


   PRN Reason: CONSTIPATION


Potassium Chloride (Klor-Con M20)  20 meq PO DAILY [facility]


   Last Admin: 03/08/21 09:16 Dose:  20 meq


   Documented by: 


Tramadol HCl (Ultram)  50 mg PO Q6H PRN


   PRN Reason: Pain (4-6/10)


   Last Admin: 03/02/21 20:12 Dose:  50 mg


   Documented by: 


Trimethoprim/Sulfamethoxazole (Septra Ds)  1 tab PO MoWeFr@0900 [facility]


   Last Admin: 03/08/21 09:16 Dose:  1 tab


   Documented by: 


Valacyclovir HCl (Valtrex)  500 mg PO DAILY [facility]


   Last Admin: 03/08/21 09:16 Dose:  500 mg


   Documented by: 





Discontinued Medications





Cefepime HCl (Maxipime)  2 gm IVPUSH ONETIME ONE


   Stop: 02/22/21 17:01


   Last Admin: 02/22/21 18:12 Dose:  2 gm


   Documented by: 


Al Hydroxide/Mg Hydroxide 30 ml/ Lidocaine HCl 30 ml/Diphenhydramine HCl 75 

mg/Nystatin 30 ml  0 ml PO QID [facility]


   Stop: 02/24/21 14:00


   Last Admin: 02/24/21 13:30 Dose:  5 ml


   Documented by: 


Vancomycin HCl 1.25 gm/ Sodium (Chloride)  250 mls @ 200 mls/hr IV ONETIME ONE


   Stop: 02/22/21 14:44


   Last Admin: 02/22/21 13:42 Dose:  200 mls/hr


   Documented by: 


Potassium Chloride (Klor-Con M20)  20 meq PO DAILY [facility]


   Stop: 02/23/21 18:01


Saccharomyces Boulardii (Florastor)  250 mg PO BID [facility]


   Stop: 02/22/21 23:00


   Last Admin: 02/22/21 20:55 Dose:  250 mg


   Documented by: 


Tramadol HCl (Ultram)  50 mg PO DAILY@0800 [facility]


   Last Admin: 03/05/21 08:27 Dose:  50 mg


   Documented by: 











- Exam


General: Reports: Alert, Oriented, Cooperative, No Acute Distress


HEENT: Reports: EOMI, Mucous Membr. Moist/Pink


Lungs: Reports: Clear to Auscultation, Normal Respiratory Effort


Cardiovascular: Reports: Regular Rate, Regular Rhythm


GI/Abdominal Exam: Normal Bowel Sounds, Soft, Non-Tender


Back Exam: Reports: Normal Inspection.  Denies: CVA Tenderness (R), CVA 

Tenderness (L)


Extremities: Pedal Edema


Skin: Reports: Warm, Dry


Psy/Mental Status: Reports: Alert, Normal Affect, Normal Mood





*Q Meaningful Use (DIS)





- VTE *Q


VTE Pharmacological Contraindications *Q: Thrombocytopenia


VTE Anticoagulation Contraindications: Medical/Procedure Contrai

## 2021-03-29 NOTE — EDM.PDOC
ED HPI GENERAL MEDICAL PROBLEM





- General


Chief Complaint: General


Stated Complaint: CHEST PAIN


Time Seen by Provider: 21 13:09


Source of Information: Reports: Patient


History Limitations: Reports: No Limitations





- History of Present Illness


INITIAL COMMENTS - FREE TEXT/NARRATIVE: 





Presents with spasmodic anterior chest pain since 3/25/2021, onset after she 

raised her left shoulder using her right hand. Patient recently fractured the 

shoulder and was attempting to improve ROM. She had sudden onset pain across the

chest which has been intermittent but persisted. Pain is described as sharp and 

worse with palpation. She does endorse SOB, but attributes that to Asthma since 

she has not yet had her breathing treatment. Denies prior h/o CAD. No 

improvement after Tramadol given by Home Health RN today at 1045.


Onset Date: 21


Location: Reports: Chest


Quality: Reports: Sharp


Severity: Moderate


Treatments PTA: Reports: Acetaminophen, Other Medication(s)


Other Treatments PTA: Pt given 50mg Tramadol PO by her home health at 1045


  ** Left Shoulder


Pain Score (Numeric/FACES): 10





- Related Data


                                    Allergies











Allergy/AdvReac Type Severity Reaction Status Date / Time


 


banana [Banana] Allergy  Respiratory Verified 21 11:46





   Depression  


 


corn [[place]] Allergy  Respiratory Verified 21 11:46





   Depression  


 


iodine Allergy  Rash Verified 21 11:46


 


nut - unspecified Allergy  Swollen Verified 21 11:46





   Tongue  


 


oxycodone [Oxycodone] Allergy  Itching Verified 21 11:46


 


shellfish derived Allergy  Rash Verified 21 11:46











Home Meds: 


                                    Home Meds





Albuterol/Ipratropium [DuoNeb 3.0-0.5 MG/3 ML] 3 ml INH QID 17 [History]


Budesonide [Pulmicort] 0.5 mg IH BID 17 [History]


Levothyroxine Sodium 50 mcg PO DAILY 17 [History]


Montelukast [Singulair] 10 mg PO BEDTIME 17 [History]


Omeprazole Magnesium [Prilosec Otc] 20 mg PO DAILY PRN 17 [History]


Calcium Carbonate/Vitamin D3 [Calcium 600-Vit D3 200 Tablet] 1 tab PO BID 

21 [History]


Acetaminophen [Tylenol Extra Strength] 500 mg PO Q6H  tablet 21 [Rx]


Aspirin [Halfprin] 81 mg PO DAILY #30 tab.ec 21 [Rx]


Ondansetron [Zofran ODT] 4 mg PO Q6H PRN #20 tab.dis 21 [Rx]


Potassium Chloride [Klor-Con M20] 20 meq PO DAILY #30 tab.er 21 [Rx]


Sulfamethoxazole/Trimethoprim [Septra DS] 1 tab PO MoWeFr@0900 #12 tablet 

21 [Rx]


allopurinoL [Zyloprim] 100 mg PO DAILY #30 tablet 21 [Rx]


dexAMETHasone [Dexamethasone] 40 mg PO Q7D #30 tablet 21 [Rx]


polyethylene glycoL 3350 [MiraLAX] 17 gm PO DAILY PRN #1 bottle 21 [Rx]


traMADol [Ultram] 50 mg PO Q6H PRN #10 tablet 21 [Rx]


valACYclovir HCl [Valtrex] 500 mg PO DAILY 30 Days #30 tablet 21 [Rx]


Baclofen 10 mg PO TID PRN #20 tablet 21 [Rx]











Past Medical History


HEENT History: Reports: Cataract


Cardiovascular History: Denies: CAD


Respiratory History: Reports: Asthma


Gastrointestinal History: Reports: Colon Polyp, GERD


Genitourinary History: Reports: None


OB/GYN History: Reports: Pregnancy


Other OB/GYN History: 


Musculoskeletal History: Reports: Arthritis, Fracture, Osteoarthritis, Other 

(See Below)


Other Musculoskeletal History: DJD


Neurological History: Reports: None


Psychiatric History: Reports: None


Endocrine/Metabolic History: Reports: Hypothyroidism, Obesity/BMI 30+


Hematologic History: Reports: Anemia, Blood Transfusion(s), Iron Deficiency


Immunologic History: Reports: None


Oncologic (Cancer) History: Reports: Other (See Below)


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 

disease 21 multiple myeloma


Dermatologic History: Reports: None





- Infectious Disease History


Infectious Disease History: Reports: Chicken Pox, Measles, Mumps





- Past Surgical History


Head Surgeries/Procedures: Reports: None


HEENT Surgical History: Reports: Adenoidectomy, Cataract Surgery, Tonsillectomy


Other HEENT Surgeries/Procedures: bilat cataract


GI Surgical History: Reports: Colonoscopy


Female  Surgical History: Reports: Hysterectomy, Salpingo-Oophorectomy


Musculoskeletal Surgical History: Reports: Arthroscopic Knee, Knee Replacement, 

Other (See Below)


Other Musculoskeletal Surgeries/Procedures:: partial R knee replacement, left 

hip replacement 21, left shoulder fracture 21


Oncologic Surgical History: Reports: None





Social & Family History





- Family History


Family Medical History: No Pertinent Family History





- Tobacco Use


Tobacco Use Status *Q: Former Tobacco User


Used Tobacco, but Quit: Yes


Month/Year Tobacco Last Used: 


Second Hand Smoke Exposure: No





- Caffeine Use


Caffeine Use: Reports: Coffee


Other Caffeine Use: one cup daily





- Recreational Drug Use


Recreational Drug Use: No





ED ROS GENERAL





- Review of Systems


Review Of Systems: Comprehensive ROS is negative, except as noted in HPI.





ED EXAM, GENERAL





- Physical Exam


Exam: See Below


Exam Limited By: No Limitations


General Appearance: Alert, WD/WN, No Apparent Distress


Throat/Mouth: No Airway Compromise


Head: Atraumatic, Normocephalic


Neck: Full Range of Motion


Respiratory/Chest: No Respiratory Distress, Lungs Clear, Normal Breath Sounds, 

Other (Anterior chest wall is markedly tender)


Cardiovascular: Regular Rate, Rhythm, No Murmur


GI/Abdominal: Soft, Non-Tender


Back Exam: Full Range of Motion


Extremities: Normal Range of Motion, Other (no significant left shoulder 

tenderness)


Neurological: Alert, Normal Cognition


Psychiatric: Normal Affect, Normal Mood


Skin Exam: Warm, Dry, Intact


  ** #1 Interpretation


EKG Date: 21


Time: 13:20


Rhythm: NSR


Rate (Beats/Min): 78


Axis: Normal


P-Wave: Present


QRS: Normal


ST-T: Normal


QT: Normal


Comparison: NA - No Prior EKG


EKG Interpretation Comments: 





Q waves III, aVF





Course





- Vital Signs


Last Recorded V/S: 


                                Last Vital Signs











Temp  36.4 C   21 14:00


 


Pulse  75   21 14:00


 


Resp  18   21 14:00


 


BP  106/51 L  21 14:00


 


Pulse Ox  100   21 14:00














- Orders/Labs/Meds


Orders: 


                               Active Orders 24 hr











 Category Date Time Status


 


 EKG Documentation Completion [RC] ASDIRECTED Care  21 13:07 Active


 


 CXR [Chest 1V Frontal] [CR] Stat Exams  21 13:06 Taken


 


 D-DIMER QUANTITATIVE [COAG] Stat Lab  21 13:25 Stop Req


 


 EKG 12 Lead [EK] Stat Ther  21 13:06 Ordered











Labs: 


                                Laboratory Tests











  21 Range/Units





  13:25 13:25 13:25 


 


WBC  4.6    (3.0-10.3)  x10-3/uL


 


RBC  3.25 L    (3.60-5.20)  x10(6)uL


 


Hgb  10.0 L    (11.4-15.5)  g/dL


 


Hct  30.7 L    (34.2-48.2)  %


 


MCV  94.7    (76.7-100.5)  fL


 


MCH  30.7    (23.9-33.9)  pg


 


MCHC  32.4    (31.9-34.8)  g/dL


 


RDW  16.8 H    (12.3-16.5)  %


 


Plt Count  390    (151-488)  x10(3)uL


 


MPV  7.5    (7.1-12.4)  fL


 


Neut % (Auto)  70.2    (30.8-76.2)  %


 


Lymph % (Auto)  10.5 L    (18.4-52.1)  %


 


Mono % (Auto)  17.6 H    (4.4-15.7)  %


 


Eos % (Auto)  1.2    (0.6-8.1)  %


 


Baso % (Auto)  0.5    (0.2-1.5)  %


 


Neut # (Auto)  3.2    (1.5-6.3)  x10-3/uL


 


Lymph # (Auto)  0.5 L    (1.0-4.4)  x10-3/uL


 


Mono # (Auto)  0.8    (0.3-1.0)  x10-3/uL


 


Eos # (Auto)  0.1    (0.0-0.8)  x10-3/uL


 


Baso # (Auto)  0.0    (0.0-0.1)  x10-3/uL


 


PT   12.3 H   (9.0-11.1)  sec


 


INR   1.15   (1.00-1.24)  


 


APTT   25.0   (24.4-33.2)  SECONDS


 


Sodium    140  (135-145)  mmol/L


 


Potassium    4.5  (3.5-5.3)  mmol/L


 


Chloride    104  (100-110)  mmol/L


 


Carbon Dioxide    22  (21-32)  mmol/L


 


BUN    12  (7-18)  mg/dL


 


Creatinine    0.6  (0.55-1.02)  mg/dL


 


Est Cr Clr Drug Dosing    59.98  mL/min


 


Estimated GFR (MDRD)    > 60  (>60)  


 


BUN/Creatinine Ratio    20.0  (9-20)  


 


Glucose    83  ()  mg/dL


 


Calcium    8.1 L  (8.6-10.2)  mg/dL


 


Total Bilirubin    0.3  (0.1-1.3)  mg/dL


 


AST    15  D  (5-25)  IU/L


 


ALT    17  (12-36)  U/L


 


Alkaline Phosphatase    157 H  ()  IU/L


 


Troponin I     (4.0-60.3)  pg/mL


 


Total Protein    6.5  (6.0-8.0)  g/dL


 


Albumin    3.3  (3.2-4.6)  g/dL


 


Globulin    3.2  g/dL


 


Albumin/Globulin Ratio    1.0  














  03/29/ Range/Units





  13:25 


 


WBC   (3.0-10.3)  x10-3/uL


 


RBC   (3.60-5.20)  x10(6)uL


 


Hgb   (11.4-15.5)  g/dL


 


Hct   (34.2-48.2)  %


 


MCV   (76.7-100.5)  fL


 


MCH   (23.9-33.9)  pg


 


MCHC   (31.9-34.8)  g/dL


 


RDW   (12.3-16.5)  %


 


Plt Count   (151-488)  x10(3)uL


 


MPV   (7.1-12.4)  fL


 


Neut % (Auto)   (30.8-76.2)  %


 


Lymph % (Auto)   (18.4-52.1)  %


 


Mono % (Auto)   (4.4-15.7)  %


 


Eos % (Auto)   (0.6-8.1)  %


 


Baso % (Auto)   (0.2-1.5)  %


 


Neut # (Auto)   (1.5-6.3)  x10-3/uL


 


Lymph # (Auto)   (1.0-4.4)  x10-3/uL


 


Mono # (Auto)   (0.3-1.0)  x10-3/uL


 


Eos # (Auto)   (0.0-0.8)  x10-3/uL


 


Baso # (Auto)   (0.0-0.1)  x10-3/uL


 


PT   (9.0-11.1)  sec


 


INR   (1.00-1.24)  


 


APTT   (24.4-33.2)  SECONDS


 


Sodium   (135-145)  mmol/L


 


Potassium   (3.5-5.3)  mmol/L


 


Chloride   (100-110)  mmol/L


 


Carbon Dioxide   (21-32)  mmol/L


 


BUN   (7-18)  mg/dL


 


Creatinine   (0.55-1.02)  mg/dL


 


Est Cr Clr Drug Dosing   mL/min


 


Estimated GFR (MDRD)   (>60)  


 


BUN/Creatinine Ratio   (9-20)  


 


Glucose   ()  mg/dL


 


Calcium   (8.6-10.2)  mg/dL


 


Total Bilirubin   (0.1-1.3)  mg/dL


 


AST   (5-25)  IU/L


 


ALT   (12-36)  U/L


 


Alkaline Phosphatase   ()  IU/L


 


Troponin I  < 4.0 L  (4.0-60.3)  pg/mL


 


Total Protein   (6.0-8.0)  g/dL


 


Albumin   (3.2-4.6)  g/dL


 


Globulin   g/dL


 


Albumin/Globulin Ratio   











Meds: 


Medications














Discontinued Medications














Generic Name Dose Route Start Last Admin





  Trade Name Freq  PRN Reason Stop Dose Admin


 


Ketorolac Tromethamine  15 mg  21 13:08  21 13:35





  Ketorolac 30 Mg/Ml Sdv  IVPUSH  21 13:09  15 mg





  ONETIME ONE   Administration














- Radiology Interpretation


Free Text/Narrative:: 





CXR: No acute process. (ED provider interpretation)





- Re-Assessments/Exams


Free Text/Narrative Re-Assessment/Exam: 





21 14:28


Pain has significantly improved after Toradol 15mg IV.





Departure





- Departure


Time of Disposition: 14:29


Disposition: Home, Self-Care 01


Condition: Good


Clinical Impression: 


Chest wall muscle strain


Qualifiers:


 Encounter type: initial encounter Qualified Code(s): S29.011A - Strain of 

muscle and tendon of front wall of thorax, initial encounter








- Discharge Information


*PRESCRIPTION DRUG MONITORING PROGRAM REVIEWED*: Yes


*COPY OF PRESCRIPTION DRUG MONITORING REPORT IN PATIENT ALEK: No


Prescriptions: 


Baclofen 10 mg PO TID PRN #20 tablet


 PRN Reason: Muscle Spasm


Instructions:  Muscle Strain, Easy-to-Read


Referrals: 


Strand,Duane, MD [Primary Care Provider] - 


Forms:  ED Department Discharge


Additional Instructions: 


You may take OTC Ibuprofen 400 mg every 6 hours as needed to control pain. Fill 

the Baclofen prescription at [facility] and take as directed. Rest. Apply

a heating pad to the affected area as needed for comfort. Follow up with your 

primary physician in 3 days if symptoms don't improve. Return to the ER if 

symptoms worsen.





Sepsis Event Note (ED)





- Evaluation


Sepsis Screening Result: No Definite Risk





- Focused Exam


Vital Signs: 


                                   Vital Signs











  Temp Pulse Resp BP BP Pulse Ox


 


 21 14:00  36.4 C  75  18  106/51 L   100


 


 21 12:46  36.7 C  78  20   102/51 L  100


 


 21 11:45  36.6 C  80  20   98/50 L 


 


 21 11:33  36.6 C  87  22 H   135/84  100














- My Orders


Last 24 Hours: 


My Active Orders





21 13:06


CXR [Chest 1V Frontal] [CR] Stat 


EKG 12 Lead [EK] Stat 





21 13:07


EKG Documentation Completion [RC] ASDIRECTED 





21 13:25


D-DIMER QUANTITATIVE [COAG] Stat 














- Assessment/Plan


Last 24 Hours: 


My Active Orders





21 13:06


CXR [Chest 1V Frontal] [CR] Stat 


EKG 12 Lead [EK] Stat 





21 13:07


EKG Documentation Completion [RC] ASDIRECTED 





21 13:25


D-DIMER QUANTITATIVE [COAG] Stat

## 2021-03-29 NOTE — CR
CHEST ONE VIEW  0588



INDICATION:  Chest pain/muscle spasm centrally in the chest.  



Two AP upright portable views of the chest were obtained 03/29/2021 and compared
with 02/13/2021.  



The heart is enlarged with tortuous aorta.  



Pulmonary markings appear similar to the previous study without a definite 
active infiltrate or effusion.  



A fracture through the proximal humerus is again noted with no significant 
interval progress in healing suggested.  



No gross consolidating pneumonia or effusion was seen.  



Somewhat hyperaerated appearance is suggested.  Exogenous obesity is noted.  



Upper lung field pulmonary vasculature appears somewhat prominent raising 
question of a mild degree of CHF.  



IMPRESSION: 

1.  ASHD with cardiomegaly and possible mild or early CHF.

2.  Probable COPD--correlate clinically. 

3.  Humeral fracture site on the left.

4.  Exogenous obesity.  

5.  With heavy markings present it is difficult to entirely exclude minimal 
patchy bronchopneumonia at the lung bases, more likely fibrotic in nature.  

MTDD

## 2021-06-19 NOTE — EDM.PDOC
ED HPI GENERAL MEDICAL PROBLEM





- General


Stated Complaint: PAIN POSS FROM NEW MED


Time Seen by Provider: 21 22:12


Source of Information: Reports: Patient


History Limitations: Reports: No Limitations





- History of Present Illness


INITIAL COMMENTS - FREE TEXT/NARRATIVE: 


Melinda complains of back and trunk pain. She has had it for a while,now 

worse,despite Cymbalta prescribed by her PCP. She has a h/o MM and has 

experienced bone pain in the past.Mobic seems to give her some relief.No chest 

pain,fever or chills.Denies SOB


  ** Torso, back & rib area


Pain Score (Numeric/FACES): 10





- Related Data


                                    Allergies











Allergy/AdvReac Type Severity Reaction Status Date / Time


 


banana [Banana] Allergy  Respiratory Verified 21 22:10





   Depression  


 


corn [[place]] Allergy  Respiratory Verified 21 22:10





   Depression  


 


iodine Allergy  Rash Verified 21 22:10


 


nut - unspecified Allergy  Swollen Verified 21 22:10





   Tongue  


 


oxycodone [Oxycodone] Allergy  Itching Verified 21 22:10


 


shellfish derived Allergy  Rash Verified 21 22:10











Home Meds: 


                                    Home Meds





Albuterol/Ipratropium [DuoNeb 3.0-0.5 MG/3 ML] 3 ml INH QID 17 [History]


Budesonide [Pulmicort] 0.5 mg IH BID 17 [History]


Levothyroxine Sodium 50 mcg PO DAILY 17 [History]


Montelukast [Singulair] 10 mg PO BEDTIME 17 [History]


Omeprazole Magnesium [Prilosec Otc] 20 mg PO DAILY PRN 17 [History]


Calcium Carbonate/Vitamin D3 [Calcium 600-Vit D3 200 Tablet] 1 tab PO BID 

21 [History]


Acetaminophen [Tylenol Extra Strength] 500 mg PO Q6H  tablet 21 [Rx]


Aspirin [Halfprin] 81 mg PO DAILY #30 tab.ec 21 [Rx]


Ondansetron [Zofran ODT] 4 mg PO Q6H PRN #20 tab.dis 21 [Rx]


Potassium Chloride [Klor-Con M20] 20 meq PO DAILY #30 tab.er 21 [Rx]


Sulfamethoxazole/Trimethoprim [Septra DS] 1 tab PO MoWeFr@0900 #12 tablet 

21 [Rx]


allopurinoL [Zyloprim] 100 mg PO DAILY #30 tablet 21 [Rx]


dexAMETHasone [Dexamethasone] 40 mg PO Q7D #30 tablet 21 [Rx]


polyethylene glycoL 3350 [MiraLAX] 17 gm PO DAILY PRN #1 bottle 21 [Rx]


traMADol [Ultram] 50 mg PO Q6H PRN #10 tablet 21 [Rx]


valACYclovir HCl [Valtrex] 500 mg PO DAILY 30 Days #30 tablet 21 [Rx]


Baclofen 10 mg PO TID PRN #20 tablet 21 [Rx]











Past Medical History


HEENT History: Reports: Cataract


Cardiovascular History: Denies: CAD


Respiratory History: Reports: Asthma


Gastrointestinal History: Reports: Colon Polyp, GERD


Genitourinary History: Reports: None


OB/GYN History: Reports: Pregnancy


Other OB/GYN History: 


Musculoskeletal History: Reports: Arthritis, Fracture, Osteoarthritis, Other 

(See Below)


Other Musculoskeletal History: DJD


Neurological History: Reports: None


Psychiatric History: Reports: None


Endocrine/Metabolic History: Reports: Hypothyroidism, Obesity/BMI 30+


Hematologic History: Reports: Anemia, Blood Transfusion(s), Iron Deficiency


Immunologic History: Reports: None


Oncologic (Cancer) History: Reports: Other (See Below)


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 

disease 21 multiple myeloma


Dermatologic History: Reports: None





- Infectious Disease History


Infectious Disease History: Reports: Chicken Pox, Measles, Mumps





- Past Surgical History


Head Surgeries/Procedures: Reports: None


HEENT Surgical History: Reports: Adenoidectomy, Cataract Surgery, Tonsillectomy


Other HEENT Surgeries/Procedures: bilat cataract


GI Surgical History: Reports: Colonoscopy


Female  Surgical History: Reports: Hysterectomy, Salpingo-Oophorectomy


Musculoskeletal Surgical History: Reports: Arthroscopic Knee, Knee Replacement, 

Other (See Below)


Other Musculoskeletal Surgeries/Procedures:: partial R knee replacement, left 

hip replacement 21, left shoulder fracture 21


Oncologic Surgical History: Reports: None





Social & Family History





- Family History


Family Medical History: No Pertinent Family History





- Caffeine Use


Caffeine Use: Reports: Coffee


Other Caffeine Use: one cup daily





ED ROS GENERAL





- Review of Systems


Review Of Systems: Comprehensive ROS is negative, except as noted in HPI.





ED EXAM, GENERAL





- Physical Exam


Exam: See Below


Exam Limited By: Other (In pain)


General Appearance: Alert, Anxious


Ear Exam: Bilateral Ear: Auricle Normal, Canal Normal, TM normal


Nose: Normal Inspection


Throat/Mouth: Normal Inspection


Head: Atraumatic


Neck: Normal Inspection


Respiratory/Chest: No Respiratory Distress, Lungs Clear


Cardiovascular: Normal Peripheral Pulses


Back Exam: Decreased Range of Motion, Muscle Spasm, Paraspinal Tenderness


Extremities: Normal Inspection


Neurological: Alert, Oriented





Course





- Vital Signs


Last Recorded V/S: 


                                Last Vital Signs











Temp  97.6 F   21 22:05


 


Pulse  94   21 22:05


 


Resp  24 H  21 22:05


 


BP  119/74   21 22:05


 


Pulse Ox  98   21 22:05














- Orders/Labs/Meds


Orders: 


                               Active Orders 24 hr











 Category Date Time Status


 


 CBC WITH AUTO DIFF [HEME] Stat Lab  21 22:20 Results


 


 CRP [C-REACTIVE PROTEIN] [CHEM] Stat Lab  21 22:20 Received


 


 TSH ULTRASENSITIVE [CHEM] Stat Lab  21 22:20 Received











Labs: 


                                Laboratory Tests











  21 Range/Units





  22:20 22:20 22:20 


 


WBC  8.4    (3.0-10.3)  x10-3/uL


 


RBC  3.67    (3.60-5.20)  x10(6)uL


 


Hgb  11.0 L    (11.4-15.5)  g/dL


 


Hct  33.2 L    (34.2-48.2)  %


 


MCV  90.5    (76.7-100.5)  fL


 


MCH  29.8    (23.9-33.9)  pg


 


MCHC  32.9    (31.9-34.8)  g/dL


 


RDW  18.6 H    (12.3-16.5)  %


 


Plt Count  112 L    (151-488)  x10(3)uL


 


MPV  8.5    (7.1-12.4)  fL


 


Add Manual Diff  Yes    


 


Sodium   137   (135-145)  mmol/L


 


Potassium   4.2   (3.5-5.3)  mmol/L


 


Chloride   99 L D   (100-110)  mmol/L


 


Carbon Dioxide   23   (21-32)  mmol/L


 


BUN   17   (7-18)  mg/dL


 


Creatinine   1.0   (0.55-1.02)  mg/dL


 


Est Cr Clr Drug Dosing   TNP   


 


Estimated GFR (MDRD)   54 L   (>60)  


 


BUN/Creatinine Ratio   17.0   (9-20)  


 


Glucose   108   ()  mg/dL


 


Calcium   10.1  D   (8.6-10.2)  mg/dL


 


Total Bilirubin   0.4   (0.1-1.3)  mg/dL


 


AST   23  D   (5-25)  IU/L


 


ALT   22  D   (12-36)  U/L


 


Alkaline Phosphatase   75   ()  IU/L


 


Creatine Kinase     ()  IU/L


 


Troponin I    4.4  (4.0-60.3)  pg/mL


 


Total Protein   6.6   (6.0-8.0)  g/dL


 


Albumin   3.8   (3.2-4.6)  g/dL


 


Globulin   2.8   g/dL


 


Albumin/Globulin Ratio   1.4   














  / Range/Units





  22:20 


 


WBC   (3.0-10.3)  x10-3/uL


 


RBC   (3.60-5.20)  x10(6)uL


 


Hgb   (11.4-15.5)  g/dL


 


Hct   (34.2-48.2)  %


 


MCV   (76.7-100.5)  fL


 


MCH   (23.9-33.9)  pg


 


MCHC   (31.9-34.8)  g/dL


 


RDW   (12.3-16.5)  %


 


Plt Count   (151-488)  x10(3)uL


 


MPV   (7.1-12.4)  fL


 


Add Manual Diff   


 


Sodium   (135-145)  mmol/L


 


Potassium   (3.5-5.3)  mmol/L


 


Chloride   (100-110)  mmol/L


 


Carbon Dioxide   (21-32)  mmol/L


 


BUN   (7-18)  mg/dL


 


Creatinine   (0.55-1.02)  mg/dL


 


Est Cr Clr Drug Dosing   


 


Estimated GFR (MDRD)   (>60)  


 


BUN/Creatinine Ratio   (9-20)  


 


Glucose   ()  mg/dL


 


Calcium   (8.6-10.2)  mg/dL


 


Total Bilirubin   (0.1-1.3)  mg/dL


 


AST   (5-25)  IU/L


 


ALT   (12-36)  U/L


 


Alkaline Phosphatase   ()  IU/L


 


Creatine Kinase  63  ()  IU/L


 


Troponin I   (4.0-60.3)  pg/mL


 


Total Protein   (6.0-8.0)  g/dL


 


Albumin   (3.2-4.6)  g/dL


 


Globulin   g/dL


 


Albumin/Globulin Ratio   











Meds: 


Medications














Discontinued Medications














Generic Name Dose Route Start Last Admin





  Trade Name Makenzie  PRN Reason Stop Dose Admin


 


Diazepam  5 mg  21 22:10  21 22:41





  Diazepam 10 Mg/2 Ml Syringe  IM  21 22:11  5 mg





  ONETIME ONE   Administration


 


Hydromorphone HCl  1 mg  21 22:10  21 22:40





  Hydromorphone 2 Mg/Ml Sdv  IM  21 22:11  1 mg





  ONETIME ONE   Administration














Departure





- Departure


Time of Disposition: 22:59


Disposition: Home, Self-Care 01


Clinical Impression: 


 Muscle spasm





Multiple myeloma


Qualifiers:


 Multiple myeloma remission status: unspecified Qualified Code(s): C90.00 - 

Multiple myeloma not having achieved remission








- Discharge Information





Sepsis Event Note (ED)





- Focused Exam


Vital Signs: 


                                   Vital Signs











  Temp Pulse Resp BP Pulse Ox


 


 21 22:05  97.6 F  94  24 H  119/74  98














- Problem List & Annotations


(1) Muscle spasm


SNOMED Code(s): 18668904


   Code(s): M62.838 - OTHER MUSCLE SPASM   Status: Acute   Current Visit: No   





(2) Multiple myeloma


SNOMED Code(s): 047739535


   Code(s): C90.00 - MULTIPLE MYELOMA NOT HAVING ACHIEVED REMISSION   Status: 

Chronic   Current Visit: No   


Qualifiers: 


   Multiple myeloma remission status: unspecified   Qualified Code(s): C90.00 - 

Multiple myeloma not having achieved remission   





- Problem List Review


Problem List Initiated/Reviewed/Updated: Yes





- My Orders


Last 24 Hours: 


My Active Orders





21 22:20


CBC WITH AUTO DIFF [HEME] Stat 


CRP [C-REACTIVE PROTEIN] [CHEM] Stat 


TSH ULTRASENSITIVE [CHEM] Stat 














- Assessment/Plan


Last 24 Hours: 


My Active Orders





21 22:20


CBC WITH AUTO DIFF [HEME] Stat 


CRP [C-REACTIVE PROTEIN] [CHEM] Stat 


TSH ULTRASENSITIVE [CHEM] Stat 











Plan: 


Dilaudid and Valium IM.Improved

## 2021-06-25 NOTE — EDM.PDOC
ED HPI GENERAL MEDICAL PROBLEM





- General


Chief Complaint: General


Stated Complaint: BONE PAIN


Time Seen by Provider: 21 10:53


Source of Information: Reports: Patient


History Limitations: Reports: No Limitations





- History of Present Illness


INITIAL COMMENTS - FREE TEXT/NARRATIVE: 





Patient was diagnosed with Multiple Myeloma in 2021. Presents with bone 

pain that is worse than usual since 0400 today. Pain is localized to her back 

and posterior neck and is not controlled with Meloxicam or Oxycodone. Denies 

chest pain or abdominal pain. Her next Oncology appt is next Tuesday.


Location: Reports: Neck, Back





- Related Data


                                    Allergies











Allergy/AdvReac Type Severity Reaction Status Date / Time


 


banana [Banana] Allergy  Respiratory Verified 21 10:53





   Depression  


 


corn [[place]] Allergy  Respiratory Verified 21 10:53





   Depression  


 


iodine Allergy  Rash Verified 21 10:53


 


nut - unspecified Allergy  Swollen Verified 21 10:53





   Tongue  


 


oxycodone [Oxycodone] Allergy  Itching Verified 21 10:53


 


shellfish derived Allergy  Rash Verified 21 10:53











Home Meds: 


                                    Home Meds





Albuterol/Ipratropium [DuoNeb 3.0-0.5 MG/3 ML] 3 ml INH QID 17 [History]


Budesonide [Pulmicort] 0.5 mg IH BID 17 [History]


Levothyroxine Sodium 50 mcg PO DAILY 17 [History]


Montelukast [Singulair] 10 mg PO BEDTIME 17 [History]


Omeprazole Magnesium [Prilosec Otc] 20 mg PO DAILY PRN 17 [History]


Calcium Carbonate/Vitamin D3 [Calcium 600-Vit D3 200 Tablet] 2 tab PO BID 

21 [History]


Ondansetron [Zofran ODT] 4 mg PO Q6H PRN #20 tab.dis 21 [Rx]


Potassium Chloride [Klor-Con M20] 20 meq PO DAILY #30 tab.er 21 [Rx]


Sulfamethoxazole/Trimethoprim [Septra DS] 1 tab PO MoWeFr@0900 #12 tablet 

21 [Rx]


allopurinoL [Zyloprim] 100 mg PO DAILY #30 tablet 21 [Rx]


valACYclovir HCl [Valtrex] 500 mg PO DAILY 30 Days #30 tablet 21 [Rx]


Baclofen 10 mg PO TID PRN #20 tablet 21 [Rx]


Acetaminophen [Tylenol Arthritis Pain] 650 mg PO Q6H PRN 21 [History]


DULoxetine [Cymbalta] 30 mg PO BEDTIME 21 [History]


Meloxicam 15 mg PO DAILY 21 [History]


dexAMETHasone [Dexamethasone] 20 mg PO Q7D 21 [History]











Past Medical History


HEENT History: Reports: Cataract


Cardiovascular History: Denies: CAD


Respiratory History: Reports: Asthma, COPD


Gastrointestinal History: Reports: Colon Polyp, GERD


Genitourinary History: Reports: None


OB/GYN History: Reports: Pregnancy


Other OB/GYN History: 


Musculoskeletal History: Reports: Arthritis, Fracture, Osteoarthritis, Other 

(See Below)


Other Musculoskeletal History: DJD


Neurological History: Reports: None


Psychiatric History: Reports: None


Endocrine/Metabolic History: Reports: Hypothyroidism, Obesity/BMI 30+


Hematologic History: Reports: Anemia, Blood Transfusion(s), Iron Deficiency


Immunologic History: Reports: None


Oncologic (Cancer) History: Reports: Other (See Below)


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 

disease 21 multiple myeloma


Dermatologic History: Reports: None





- Infectious Disease History


Infectious Disease History: Reports: Chicken Pox, Measles, Mumps





- Past Surgical History


Head Surgeries/Procedures: Reports: None


HEENT Surgical History: Reports: Adenoidectomy, Cataract Surgery, Tonsillectomy


Other HEENT Surgeries/Procedures: bilat cataract


GI Surgical History: Reports: Colonoscopy


Female  Surgical History: Reports: Hysterectomy, Salpingo-Oophorectomy


Musculoskeletal Surgical History: Reports: Arthroscopic Knee, Knee Replacement, 

Other (See Below)


Other Musculoskeletal Surgeries/Procedures:: partial R knee replacement, left 

hip replacement 21, left shoulder fracture 21


Oncologic Surgical History: Reports: None





Social & Family History





- Family History


Family Medical History: No Pertinent Family History





- Caffeine Use


Caffeine Use: Reports: Coffee


Other Caffeine Use: one cup daily





ED ROS GENERAL





- Review of Systems


Review Of Systems: Comprehensive ROS is negative, except as noted in HPI.





ED EXAM, GENERAL





- Physical Exam


Exam: See Below


Exam Limited By: No Limitations


General Appearance: Alert, WD/WN, Mild Distress


Nose: Normal Inspection


Throat/Mouth: Normal Voice, No Airway Compromise


Head: Atraumatic, Normocephalic


Neck: Supple


Respiratory/Chest: No Respiratory Distress, Lungs Clear


Cardiovascular: Regular Rate, Rhythm, No Murmur


Extremities: Normal Range of Motion


Neurological: Alert, Oriented, Normal Cognition


Skin Exam: Warm, Dry, Intact





Course





- Vital Signs


Last Recorded V/S: 


                                Last Vital Signs











Temp  37.3 C   21 10:45


 


Pulse  83   21 10:45


 


Resp  20   21 10:45


 


BP  125/61   21 10:45


 


Pulse Ox  98   21 10:45














- Orders/Labs/Meds


Meds: 


Medications














Discontinued Medications














Generic Name Dose Route Start Last Admin





  Trade Name Freq  PRN Reason Stop Dose Admin


 


Hydromorphone HCl  1 mg  21 10:52  21 10:56





  Hydromorphone 2 Mg/Ml Sdv  IM  21 10:53  1 mg





  ONETIME ONE   Administration


 


Ketorolac Tromethamine  30 mg  21 10:52  21 10:56





  Ketorolac 30 Mg/Ml Sdv  IM  21 10:53  30 mg





  ONETIME ONE   Administration














Departure





- Departure


Time of Disposition: 11:09


Disposition: Home, Self-Care 01


Condition: Good


Clinical Impression: 


Multiple myeloma


Qualifiers:


 Multiple myeloma remission status: unspecified Qualified Code(s): C90.00 - 

Multiple myeloma not having achieved remission





Chronic pain


Qualifiers:


 Chronic pain type: due to neoplasm Qualified Code(s): G89.3 - Neoplasm related 

pain (acute) (chronic)








- Discharge Information


*PRESCRIPTION DRUG MONITORING PROGRAM REVIEWED*: Yes


*COPY OF PRESCRIPTION DRUG MONITORING REPORT IN PATIENT [facility]: No


Instructions:  Chronic Pain, Adult


Forms:  ED Department Discharge


Additional Instructions: 


Follow up with Dr. Iniguez in 1-2 days. Return to the ER as needed.





Sepsis Event Note (ED)





- Focused Exam


Vital Signs: 


                                   Vital Signs











  Temp Pulse Resp BP Pulse Ox


 


 21 10:45  37.3 C  83  20  125/61  98

## 2021-07-04 NOTE — EDM.PDOC
ED HPI GENERAL MEDICAL PROBLEM





- General


Chief Complaint: Back Pain or Injury


Stated Complaint: PAIN


Time Seen by Provider: 21 05:15


Source of Information: Reports: Patient


History Limitations: Reports: Physical Impairment





- History of Present Illness


INITIAL COMMENTS - FREE TEXT/NARRATIVE: 





Pt s a known pt with multiple myeloma


States she has been having radiation to the left side of her back  she has fabián 

having radiation to the left side f the sine , 


Recently started having pain to the area , had been seen and given hydrocodone


Took hydrocodone  and it lasted about 2 hrs and so woke up in pain again 








Onset: Today


Onset Date: 21


Onset Time: 02:00


Duration: Hour(s):, Getting Worse


Location: Reports: Back (upper back noted to be  more painful)


Quality: Reports: Ache, Burning


Severity: Moderate


Improves with: Reports: None


Worsens with: Reports: None


Context: Reports: Other (chronic illness)


Associated Symptoms: Reports: No Other Symptoms


Treatments PTA: Reports: Other Medication(s)





- Related Data


                                    Allergies











Allergy/AdvReac Type Severity Reaction Status Date / Time


 


banana [Banana] Allergy  Respiratory Verified 21 10:53





   Depression  


 


corn [[place]] Allergy  Respiratory Verified 21 10:53





   Depression  


 


iodine Allergy  Rash Verified 21 10:53


 


nut - unspecified Allergy  Swollen Verified 21 10:53





   Tongue  


 


oxycodone [Oxycodone] Allergy  Itching Verified 21 10:53


 


shellfish derived Allergy  Rash Verified 21 10:53


 


several food allergies Allergy  Various Uncoded 21 06:04





   Reactions  











Home Meds: 


                                    Home Meds





Albuterol/Ipratropium [DuoNeb 3.0-0.5 MG/3 ML] 3 ml INH QID 17 [History]


Budesonide [Pulmicort] 2 ml IH BID 17 [History]


Levothyroxine Sodium 50 mcg PO DAILY 17 [History]


Montelukast [Singulair] 10 mg PO BEDTIME 17 [History]


Omeprazole Magnesium [Prilosec Otc] 20 mg PO DAILY PRN 17 [History]


Calcium Carbonate/Vitamin D3 [Calcium 600-Vit D3 200 Tablet] 2 tab PO BID 

21 [History]


Ondansetron [Zofran ODT] 4 mg PO Q6H PRN #20 tab.dis 21 [Rx]


Potassium Chloride [Klor-Con M20] 20 meq PO DAILY #30 tab.er 21 [Rx]


Sulfamethoxazole/Trimethoprim [Septra DS] 1 tab PO MoWeFr@0900 #12 tablet 

21 [Rx]


allopurinoL [Zyloprim] 100 mg PO DAILY #30 tablet 21 [Rx]


valACYclovir HCl [Valtrex] 500 mg PO DAILY 30 Days #30 tablet 21 [Rx]


Acetaminophen [Tylenol Arthritis Pain] 650 mg PO Q6H PRN 21 [History]


Meloxicam 15 mg PO DAILY 21 [History]


dexAMETHasone [Dexamethasone] 20 mg PO Q7D 21 [History]


Baclofen 10 mg PO TID PRN #90 tablet 21 [Rx]


Docusate Sodium/Sennosides [Senokot-S] 1 each PO ASDIRECTED 21 [History]


Fluticasone Propionate [Flonase] 2 spray NASBOTH DAILY 21 [History]


Lidocaine [Lidoderm] 1 each TP 12 #30 adh..patch 21 [Rx]


allopurinoL [Zyloprim] 100 mg PO DAILY 21 [History]


oxyCODONE HCl [Roxicodone] 5 mg PO Q6H PRN 21 [History]











Past Medical History


HEENT History: Reports: Cataract


Cardiovascular History: Denies: CAD


Respiratory History: Reports: Asthma, COPD


Gastrointestinal History: Reports: Colon Polyp, GERD


Genitourinary History: Reports: None


OB/GYN History: Reports: Pregnancy


Other OB/GYN History: 


Musculoskeletal History: Reports: Arthritis, Fracture, Osteoarthritis, Other 

(See Below)


Other Musculoskeletal History: DJD


Neurological History: Reports: None


Psychiatric History: Reports: Anxiety


Endocrine/Metabolic History: Reports: Hypothyroidism, Obesity/BMI 30+


Hematologic History: Reports: Anemia, Blood Transfusion(s), Iron Deficiency


Immunologic History: Reports: None


Oncologic (Cancer) History: Reports: Other (See Below)


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 

disease 21 multiple myeloma


Dermatologic History: Reports: None





- Infectious Disease History


Infectious Disease History: Reports: Chicken Pox, Measles, Mumps





- Past Surgical History


Head Surgeries/Procedures: Reports: None


HEENT Surgical History: Reports: Adenoidectomy, Cataract Surgery, Tonsillectomy


Other HEENT Surgeries/Procedures: bilat cataract


GI Surgical History: Reports: Colonoscopy


Female  Surgical History: Reports: Hysterectomy, Salpingo-Oophorectomy


Musculoskeletal Surgical History: Reports: Arthroscopic Knee, Knee Replacement, 

Other (See Below)


Other Musculoskeletal Surgeries/Procedures:: partial R knee replacement, left 

hip replacement 21, left shoulder fracture 21


Oncologic Surgical History: Reports: None





Social & Family History





- Family History


Family Medical History: No Pertinent Family History





- Caffeine Use


Caffeine Use: Reports: Coffee


Other Caffeine Use: one cup daily





ED ROS GENERAL





- Review of Systems


Review Of Systems: See Below


Constitutional: Reports: No Symptoms


HEENT: Reports: No Symptoms


Respiratory: Reports: No Symptoms


Cardiovascular: Reports: No Symptoms


Endocrine: Reports: No Symptoms


GI/Abdominal: Reports: No Symptoms


Musculoskeletal: Reports: Back Pain


Skin: Reports: No Symptoms


Neurological: Reports: No Symptoms


Psychiatric: Reports: No Symptoms





ED EXAM,LOWER BACK PAIN/INJURY





- Physical Exam


Exam: See Below


Exam Limited By: No Limitations


General Appearance: Alert, WD/WN, Moderate Distress (due to pain)


Eye Exam: Bilateral Eye: EOMI


Ears: Normal TMs


Nose: Normal Inspection


Throat/Mouth: Normal Oropharynx


Head: Atraumatic, Normocephalic


Neck: Supple, Non-Tender


Respiratory/Chest: Lungs Clear


Cardiovascular: Regular Rate, Rhythm


GI/Abdominal: Soft, Non-Tender


Back Exam: Decreased Range of Motion, Muscle Spasm, Paraspinal Tenderness


Extremities: Normal Inspection


Neurological: Alert, CN II-XII Intact


Psychiatric: Anxious, Tearful


Skin Exam: Warm, Dry, Intact


Lymphatic: No Adenopathy





Course





- Vital Signs


Last Recorded V/S: 





                                Last Vital Signs











Temp  37.0 C   21 05:14


 


Pulse  93   21 05:14


 


Resp  24 H  21 05:14


 


BP  136/63   21 05:14


 


Pulse Ox  98   21 05:14














- Re-Assessments/Exams


Free Text/Narrative Re-Assessment/Exam: 





21 05:55


pt given IM morphine and baclofen with good relieve





Departure





- Departure


Time of Disposition: 06:15


Disposition: Home, Self-Care 01


Condition: Fair


Clinical Impression: 


 Muscle spasm





Multiple myeloma


Qualifiers:


 Multiple myeloma remission status: unspecified Qualified Code(s): C90.00 - 

Multiple myeloma not having achieved remission





Chronic pain


Qualifiers:


 Chronic pain type: due to neoplasm Qualified Code(s): G89.3 - Neoplasm related 

pain (acute) (chronic)








- Discharge Information


*PRESCRIPTION DRUG MONITORING PROGRAM REVIEWED*: No


*COPY OF PRESCRIPTION DRUG MONITORING REPORT IN PATIENT [facility]: No


Instructions:  Chronic Back Pain, Easy-to-Read


Forms:  ED Department Discharge


Additional Instructions: 


Warm compress to the affected area  3 times a day as needed


Keep your appointment with your PCP for further pain management





Sepsis Event Note (ED)





- Evaluation


Sepsis Screening Result: No Definite Risk





- Focused Exam


Vital Signs: 





                                   Vital Signs











  Temp Pulse Resp BP Pulse Ox


 


 21 05:14  37.0 C  93  24 H  136/63  98

## 2021-07-19 NOTE — EDM.PDOC
ED HPI GENERAL MEDICAL PROBLEM





- General


Stated Complaint: SOB


Time Seen by Provider: 21 10:55


Source of Information: Reports: Patient


History Limitations: Reports: No Limitations





- History of Present Illness


INITIAL COMMENTS - FREE TEXT/NARRATIVE: 





75-year-old female with long-standing history of asthma who reports beginning on

Monday of last week she developed a cough and increased difficulty breathing 

with increased need of her nebulizer treatments. This has persisted throughout 

last week and seems to have worsened toward the end of the week and she reports 

that it was much worse and she felt that she could "not catch her breath". She 

has had a cough that has been productive of some Lambe. She has had subjective 

fever and some chills. She has had no vomiting but she has had some nausea. She 

denies any chest pain. She reports she has been using her nebulizer treatments 

every 2 hours without relief and her symptoms have actually worsened today area 

and she is currently undergoing chemotherapy for multiple while Antimony but did not

get her chemotherapy last week because of thrombocytopenia. She has also been 

receiving radiation therapy in her lower back, her mid back and her left shou

lder and she received the ones in her mid back last week as well. She has not 

had much of an appetite. She has been drinking liquids okay. She reports that 

since the radiation therapy and the institution of her fentanyl patch, she has 

no pain and currently she would rate her pain as a 0/10. She is feeling very 

uncomfortable secondary to her work of breathing. There are no other associated 

signs or symptoms. There are no other modifying factors.





It should be noted that the patient is a FULL CODE.


Onset: Other (One week ago)


Duration: Getting Worse


Location: Reports: Other (Not applicable)


Quality: Reports: Other (Not applicable)


Improves with: Reports: None


Worsens with: Reports: None


Context: Reports: Other (As above)


Associated Symptoms: Reports: No Other Symptoms (Except as above)


Treatments PTA: Reports: Breathing Treatments





- Related Data


                                    Allergies











Allergy/AdvReac Type Severity Reaction Status Date / Time


 


banana [Banana] Allergy  Respiratory Verified 21 06:10





   Depression  


 


codeine Allergy  Cannot Verified 21 12:47





   Remember  


 


corn [Corn] Allergy  Respiratory Verified 21 06:10





   Depression  


 


iodine Allergy  Rash Verified 21 06:10


 


nut - unspecified Allergy  Swollen Verified 21 06:10





   Tongue  


 


oxycodone [Oxycodone] Allergy  Itching Verified 21 06:12


 


shellfish derived Allergy  Rash Verified 21 06:10


 


several food allergies Allergy  Various Uncoded 21 06:04





   Reactions  











Home Meds: 


                                    Home Meds





Albuterol/Ipratropium [DuoNeb 3.0-0.5 MG/3 ML] 3 ml INH QID PRN 17 

[History]


Budesonide [Pulmicort] 2 ml IH BID 17 [History]


Levothyroxine Sodium 50 mcg PO DAILY 17 [History]


Montelukast [Singulair] 10 mg PO BEDTIME 17 [History]


Omeprazole Magnesium [Prilosec Otc] 20 mg PO DAILY PRN 17 [History]


Calcium Carbonate/Vitamin D3 [Calcium 600-Vit D3 200 Tablet] 2 tab PO BID 

21 [History]


Ondansetron [Zofran ODT] 4 mg PO Q6H PRN #20 tab.dis 21 [Rx]


Potassium Chloride [Klor-Con M20] 20 meq PO DAILY #30 tab.er 21 [Rx]


Sulfamethoxazole/Trimethoprim [Septra DS] 1 tab PO MoWeFr@0900 #12 tablet 

21 [Rx]


allopurinoL [Zyloprim] 100 mg PO DAILY #30 tablet 21 [Rx]


valACYclovir HCl [Valtrex] 500 mg PO DAILY 30 Days #30 tablet 21 [Rx]


Acetaminophen [Tylenol Arthritis Pain] 650 mg PO Q6H PRN 21 [History]


Meloxicam 15 mg PO DAILY 21 [History]


dexAMETHasone [Dexamethasone] 20 mg PO Q7D 21 [History]


Baclofen 10 mg PO TID PRN #90 tablet 21 [Rx]


Docusate Sodium/Sennosides [Senokot-S] 1 each PO ASDIRECTED 21 [History]


Fluticasone Propionate [Flonase] 2 spray NASBOTH DAILY 21 [History]


Lidocaine [Lidoderm] 1 each TP 12 #30 adh..patch 21 [Rx]


allopurinoL [Zyloprim] 100 mg PO DAILY 21 [History]


oxyCODONE HCl [Roxicodone] 5 mg PO Q6H PRN 21 [History]











Past Medical History


HEENT History: Reports: Cataract


Respiratory History: Reports: Asthma, COPD


Gastrointestinal History: Reports: Colon Polyp, GERD


Other OB/GYN History: 


Musculoskeletal History: Reports: Arthritis, Fracture, Osteoarthritis, Other 

(See Below)


Other Musculoskeletal History: DJD


Psychiatric History: Reports: Anxiety


Endocrine/Metabolic History: Reports: Hypothyroidism, Obesity/BMI 30+


Hematologic History: Reports: Anemia, Blood Transfusion(s), Iron Deficiency, 

Other (See Below) (Thrombocytopenia)


Oncologic (Cancer) History: Reports: Other (See Below)


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 

disease 21 multiple myeloma





- Infectious Disease History


Infectious Disease History: Reports: Chicken Pox, Measles, Mumps





- Past Surgical History


HEENT Surgical History: Reports: Adenoidectomy, Cataract Surgery, Tonsillectomy


Other HEENT Surgeries/Procedures: bilat cataract


GI Surgical History: Reports: Colonoscopy


Female  Surgical History: Reports: Hysterectomy, Salpingo-Oophorectomy


Musculoskeletal Surgical History: Reports: Arthroscopic Knee, Knee Replacement, 

Other (See Below)


Other Musculoskeletal Surgeries/Procedures:: partial R knee replacement, left 

hip replacement 21, left shoulder fracture 21





Social & Family History





- Tobacco Use


Tobacco Use Status *Q: Never Tobacco User





- Caffeine Use


Caffeine Use: Reports: None


Other Caffeine Use: one cup daily





- Alcohol Use


Alcohol Use History: No





- Living Situation & Occupation


Living situation: Reports: 





ED ROS GENERAL





- Review of Systems


Review Of Systems: See Below


Constitutional: Reports: Fever, Chills, Malaise, Weakness, Fatigue


HEENT: Denies: Throat Pain, Throat Swelling


Respiratory: Reports: Shortness of Breath, Wheezing, Cough, Sputum


Cardiovascular: Denies: Chest Pain, Edema


Endocrine: Reports: Fatigue


GI/Abdominal: Reports: Nausea.  Denies: Vomiting


: Denies: Dysuria, Hematuria


Musculoskeletal: Denies: Neck Pain, Back Pain


Skin: Denies: Diaphoresis, Rash


Neurological: Denies: Dizziness, Headache


Psychiatric: Reports: Anxiety


Hematologic/Lymphatic: Reports: Anemia, Easy Bruising


Immunologic: Reports: Other (Patient with ongoing chemotherapy and is 

immunocompromised.)





ED EXAM, GENERAL





- Physical Exam


Exam: See Below


Exam Limited By: No Limitations


General Appearance: Alert, WD/WN, Moderate Distress (Increased respiratory rate.

 Increased work of breathing.)


Eye Exam: Bilateral Eye: EOMI, Normal Inspection, PERRL


Ears: Normal External Exam, Hearing Grossly Normal


Ear Exam: Bilateral Ear: Auricle Normal


Nose: Normal Inspection, Normal Mucosa, No Blood


Throat/Mouth: Normal Oropharynx, Normal Voice, No Airway Compromise


Head: Atraumatic, Normocephalic


Neck: Normal Inspection, Supple, Non-Tender, Full Range of Motion


Respiratory/Chest: Respiratory Distress, Decreased Breath Sounds, Rales, 

Rhonchi, Wheezing, Accessory Muscle Use, Prolonged Expiration


Cardiovascular: Normal Peripheral Pulses, Regular Rate, Rhythm, No Murmur


Peripheral Pulses: 2+: Radial (L), Radial (R), Dorsalis Pedis (L), Dorsalis 

Pedis (R)


GI/Abdominal: Normal Bowel Sounds, Soft, Non-Tender, No Mass


Back Exam: Normal Inspection, Full Range of Motion


Extremities: Normal Inspection, Normal Range of Motion, Non-Tender, No Pedal 

Edema, Normal Capillary Refill


Neurological: Alert, Oriented, CN II-XII Intact, Normal Cognition, No 

Motor/Sensory Deficits


Psychiatric: Anxious


Skin Exam: Warm, Dry, Intact, Normal Color, No Rash


  ** #1 Interpretation


EKG Date: 21


Time: 10:55


Rhythm: NSR


Rate (Beats/Min): 99


Axis: Normal


P-Wave: Present


QRS: Normal


ST-T: Normal


QT: Normal


Comparison: No Change (Unchanged from an EKG performed on 3/29/2021)





Course





- Vital Signs


Last Recorded V/S: 


                                Last Vital Signs











Temp  37.7 C   21 10:48


 


Pulse  104 H  21 10:48


 


Resp  24 H  21 10:48


 


BP  120/49 L  21 10:48


 


Pulse Ox  98   21 10:48














- Orders/Labs/Meds


Orders: 


                               Active Orders 24 hr











 Category Date Time Status


 


 Chest 2V [CR] Stat Exams  21 11:11 Taken


 


 CULTURE BLOOD [BC] Urgent Lab  21 11:10 Received


 


 CULTURE BLOOD [BC] Urgent Lab  21 11:15 Received


 


 Blood Culture x2 Reflex Set [OM.PC] Urgent Oth  21 11:06 Ordered


 


 Peripheral IV Insertion Adult [OM.PC] Routine Oth  21 11:06 Ordered


 


 EKG 12 Lead [EK] Routine Ther  21 11:06 Ordered











Labs: 


                                Laboratory Tests











  21 Range/Units





  11:10 11:10 11:10 


 


WBC  2.0 L    (3.0-10.3)  x10-3/uL


 


RBC  2.59 L    (3.60-5.20)  x10(6)uL


 


Hgb  7.6 L D    (11.4-15.5)  g/dL


 


Hct  23.5 L    (34.2-48.2)  %


 


MCV  90.9    (76.7-100.5)  fL


 


MCH  29.5    (23.9-33.9)  pg


 


MCHC  32.4    (31.9-34.8)  g/dL


 


RDW  19.3 H    (12.3-16.5)  %


 


Plt Count  24 L*    (151-488)  x10(3)uL


 


MPV  9.3    (7.1-12.4)  fL


 


Add Manual Diff  Yes    


 


Neutrophils % (Manual)  77    (46-82)  %


 


Lymphocytes % (Manual)  9 L    (13-37)  %


 


Monocytes % (Manual)  14 H    (4-12)  %


 


Anisocytosis  Few    


 


POC VBG pH     (7.32-7.43)  pH Units


 


POC VBG pCO2     (41-51)  mmHg


 


POC VBG HCO3     (21-29)  mmol/L


 


VBG Base Excess     (-2-3)  mmol/L


 


O2 Delivery Device     


 


Sodium   139   (135-145)  mmol/L


 


Potassium   5.2  D   (3.5-5.3)  mmol/L


 


Chloride   103   (100-110)  mmol/L


 


Carbon Dioxide   19 L   (21-32)  mmol/L


 


BUN   30 H D   (7-18)  mg/dL


 


Creatinine   1.2 H   (0.55-1.02)  mg/dL


 


Est Cr Clr Drug Dosing   TNP   


 


Estimated GFR (MDRD)   44 L   (>60)  


 


BUN/Creatinine Ratio   25.0 H   (9-20)  


 


Glucose   96   ()  mg/dL


 


Lactic Acid     (0.4-2.0)  mmol/L


 


Calcium   9.1   (8.6-10.2)  mg/dL


 


Magnesium   2.1   (1.8-2.5)  mg/dL


 


Total Bilirubin   0.5   (0.1-1.3)  mg/dL


 


AST   15  D   (5-25)  IU/L


 


ALT   16  D   (12-36)  U/L


 


Alkaline Phosphatase   100   ()  IU/L


 


Troponin I    4.2  (4.0-60.3)  pg/mL


 


C-Reactive Protein    2.0 H  (0.5-0.9)  mg/dL


 


NT-Pro-B Natriuret Pep    370 H  (<=125)  pg/mL


 


Total Protein   6.0   (6.0-8.0)  g/dL


 


Albumin   3.2   (3.2-4.6)  g/dL


 


Globulin   2.8   g/dL


 


Albumin/Globulin Ratio   1.1   














  21 Range/Units





  11:10 11:10 


 


WBC    (3.0-10.3)  x10-3/uL


 


RBC    (3.60-5.20)  x10(6)uL


 


Hgb    (11.4-15.5)  g/dL


 


Hct    (34.2-48.2)  %


 


MCV    (76.7-100.5)  fL


 


MCH    (23.9-33.9)  pg


 


MCHC    (31.9-34.8)  g/dL


 


RDW    (12.3-16.5)  %


 


Plt Count    (151-488)  x10(3)uL


 


MPV    (7.1-12.4)  fL


 


Add Manual Diff    


 


Neutrophils % (Manual)    (46-82)  %


 


Lymphocytes % (Manual)    (13-37)  %


 


Monocytes % (Manual)    (4-12)  %


 


Anisocytosis    


 


POC VBG pH   7.43  (7.32-7.43)  pH Units


 


POC VBG pCO2   27 L  (41-51)  mmHg


 


POC VBG HCO3   18 L  (21-29)  mmol/L


 


VBG Base Excess   -6 L  (-2-3)  mmol/L


 


O2 Delivery Device   Room air  


 


Sodium    (135-145)  mmol/L


 


Potassium    (3.5-5.3)  mmol/L


 


Chloride    (100-110)  mmol/L


 


Carbon Dioxide    (21-32)  mmol/L


 


BUN    (7-18)  mg/dL


 


Creatinine    (0.55-1.02)  mg/dL


 


Est Cr Clr Drug Dosing    


 


Estimated GFR (MDRD)    (>60)  


 


BUN/Creatinine Ratio    (9-20)  


 


Glucose    ()  mg/dL


 


Lactic Acid  1.7   (0.4-2.0)  mmol/L


 


Calcium    (8.6-10.2)  mg/dL


 


Magnesium    (1.8-2.5)  mg/dL


 


Total Bilirubin    (0.1-1.3)  mg/dL


 


AST    (5-25)  IU/L


 


ALT    (12-36)  U/L


 


Alkaline Phosphatase    ()  IU/L


 


Troponin I    (4.0-60.3)  pg/mL


 


C-Reactive Protein    (0.5-0.9)  mg/dL


 


NT-Pro-B Natriuret Pep    (<=125)  pg/mL


 


Total Protein    (6.0-8.0)  g/dL


 


Albumin    (3.2-4.6)  g/dL


 


Globulin    g/dL


 


Albumin/Globulin Ratio    











Meds: 


Medications














Discontinued Medications














Generic Name Dose Route Start Last Admin





  Trade Name Freq  PRN Reason Stop Dose Admin


 


Albuterol  2.5 mg  21 11:11  21 11:20





  Albuterol 0.083% 2.5 Mg/3 Ml Neb Soln  NEB  21 11:12  2.5 mg





  ONETIME ONE   Administration


 


Piperacillin Sod/Tazobactam  50 mls @ 100 mls/hr  21 12:41  21 13:06





  Sod 3.375 gm/ Sodium Chloride  IV  21 13:10  100 mls/hr





  ONETIME ONE   Administration


 


Vancomycin HCl 1 gm/ Premix  200 mls @ 200 mls/hr  21 12:42  21 

13:35





  IV  21 12:43  200 mls/hr





  STAT ONE   Administration


 


Sodium Chloride  1,000 mls @ 125 mls/hr  21 13:45  21 14:15





  Normal Saline  IV   125 mls/hr





  ASDIRECTED WILIAM   Administration


 


Sodium Chloride  500 mls @ 999 mls/hr  21 13:32  21 13:41





  Normal Saline  IV  21 14:02  999 mls/hr





  .BOLUS ONE   Administration


 


Methylprednisolone Sodium Succinate  125 mg  21 11:12  21 11:28





  Methylprednisolone Sodium Succinate 125 Mg/2 Ml Sdv  IVPUSH  21 11:13  

125 mg





  ONETIME ONE   Administration


 


Ondansetron HCl  4 mg  21 13:16  21 13:22





  Ondansetron 4 Mg/2 Ml Sdv  IVPUSH  21 13:17  4 mg





  ONETIME ONE   Administration


 


Sodium Chloride  10 ml  21 11:06  21 11:25





  Sodium Chloride 0.9% 10 Ml Syringe  FLUSH   10 ml





  ASDIRECTED PRN   Administration





  Keep Vein Open  














- Radiology Interpretation


Free Text/Narrative:: 





Chest x-ray PA and lateral shows bilateral lower lobe infiltrates with blunting 

of the CVAs bilaterally per my read.





- Re-Assessments/Exams


Free Text/Narrative Re-Assessment/Exam: 





21 12:10: The patient is still having increased work of breathing. Her 

blood pressures are in the 100 systolic range. Her pulse rate is in the 80 to 90

 range. Her O2 saturations are staying at %. Her white blood cell count is

 2.0. Her hemoglobin is 7.6. Her platelet count was 26K. a venous pH was 7.43. 

Her electrolytes were normal. Bicarbonate was 19. A BUN was 30 and a creatinine 

is 1.2. ENT was slightly elevated 370. A troponin was normal. CRP was 2.0. A 

serum lactate was normal. Chest x-ray shows bilateral lower lobe 

infiltrates/atelectasis and also blunting of the . The patient was given an 

albuterol neb and Solu-Medrol IV. She is still having fairly significant risk or

 distress. She has bilateral pneumonias. She is immunocompromised. She required 

admission with IV antibiotics and steroids and frequent nebulizer treatments. 

The patient tells me that she would want to stay at [facility] if at

 all possible. She is supposed to be getting chemotherapy tomorrow. I will 

discuss the patient's case with Dr. Nuñez.





21 12:15: I discussed the patient's case with Dr. Nuñez and she feels 

that the patient will require specially cares through hematology and oncology 

which are not available at [facility] and she feels that the patient

 would be best served being transferred to [facility] in [place]. I will discuss all

 this with the patient.





21 12:25: I discussed this with the patient and her  and she would 

be in with transfer to [facility]. Therefore I will call and discuss her 

case with the doctors at [facility].





21 12:45:  I discussed the patient's case with Dr. Santamaria, intake 

physician at [facility], and he has agreed to accept the patient in 

transfer. He did request that I give the patient Zosyn and vancomycin IV.





21 12:55:  I discussed all this with the patient and with her . 

They are in agreement with the plan for transfer to [facility]. Patient 

will need to be transferred via ALS ambulance service to [facility] for 

direct admission.





21 13:30: The patient is receiving her IV antibiotics. Her blood pressures

 have remained in the 100 systolic range. I will give the patient a normal 

saline bolus at 500 mL and place her on 125 mL per hour.  [facility] has 

called back and they do have been available. We will now arranged for EMS 

transport. The patient is remaining rest are stable at this time.








Departure





- Departure


Time of Disposition: 14:20


Disposition: DC/Tfer to Acute Hospital 02


Condition: Fair (Guarded)


Clinical Impression: 


 Immunocompromised state





Bilateral pneumonia


Qualifiers:


 Pneumonia type: due to unspecified organism Lung location: lower lobe of lung 

Qualified Code(s): J18.9 - Pneumonia, unspecified organism





Acute asthma exacerbation


Qualifiers:


 Asthma severity: severe Asthma persistence: persistent Qualified Code(s): 

J45.51 - Severe persistent asthma with (acute) exacerbation





Multiple myeloma


Qualifiers:


 Multiple myeloma remission status: unspecified Qualified Code(s): C90.00 - 

Multiple myeloma not having achieved remission








- Discharge Information


Referrals: 


Strand,Duane, MD [Primary Care Provider] - 


Forms:  ED Department Discharge





Sepsis Event Note (ED)





- Focused Exam


Vital Signs: 


                                   Vital Signs











  Temp Pulse Resp BP Pulse Ox


 


 21 10:48  37.7 C  104 H  24 H  120/49 L  98














- My Orders


Last 24 Hours: 


My Active Orders





21 11:06


Blood Culture x2 Reflex Set [OM.PC] Urgent 


Peripheral IV Insertion Adult [OM.PC] Routine 


EKG 12 Lead [EK] Routine 





21 11:10


CULTURE BLOOD [BC] Urgent 





21 11:11


Chest 2V [CR] Stat 





21 11:15


CULTURE BLOOD [BC] Urgent 














- Assessment/Plan


Last 24 Hours: 


My Active Orders





21 11:06


Blood Culture x2 Reflex Set [OM.PC] Urgent 


Peripheral IV Insertion Adult [OM.PC] Routine 


EKG 12 Lead [EK] Routine 





21 11:10


CULTURE BLOOD [BC] Urgent 





21 11:11


Chest 2V [CR] Stat 





21 11:15


CULTURE BLOOD [BC] Urgent

## 2021-08-29 NOTE — EDM.PDOC
ED HPI GENERAL MEDICAL PROBLEM





- General


Stated Complaint: SOB


Time Seen by Provider: 21 17:10


Source of Information: Reports: Patient


History Limitations: Reports: No Limitations





- History of Present Illness


INITIAL COMMENTS - FREE TEXT/NARRATIVE: 





75-year-old female had chemotherapy 2 weeks ago and was noted to have difficulty

breathing and had a chest x-ray at that time which showed right sided pleural 

effusion. She had that drained and 2 L were taken off and since then she has had

her chest x-ray checked several times and the effusion came back somewhat but 

was not as big and they did not feel any intervention was necessary. She has 

noted over the past 2 weeks that her breathing has been somewhat poor and over 

the past week she has had increasing difficulty breathing and over the past 2 

days it has gotten much more severe. She has had cough but no phlegm production.

She has been using her DuoNeb nebs today with no relief of her symptoms of 

difficulty breathing. She feels weak all over and has marked shortness of breath

with any activity. She states when she is at rest she has better breathing but 

still has some difficulty breathing. She does have pain in her mid back that 

seems to radiate around to her chest. This is an aching pain. It is constant. 

She has had this pain for the past 3 weeks. She rates the pain as a 2/10 right 

now. There is been no nausea or vomiting. She has not been drinking liquids or 

eating very well secondary to her difficulty breathing. No diarrhea. No fevers 

but she has had chills. She occasionally has had sweats as well. She states that

she was supposed to have chemotherapy on this coming Tuesday but may not get it 

because her platelet count was low (it was in the 15,000 range). There are no 

other associated signs or symptoms. There are no other modifying factors.


Onset: Other (Worsening over the past week and much worse over the past 2 days.)


Duration: Getting Worse


Location: Reports: Chest, Back


Quality: Reports: Ache


Severity: Mild (The pain is mild but the difficulty breathing as moderate to 

severe.)


Improves with: Reports: Rest


Worsens with: Reports: Other (Any activity), Movement


Context: Reports: Other (As above.)


Associated Symptoms: Reports: No Other Symptoms (Except as above.)


Treatments PTA: Reports: Breathing Treatments





- Related Data


                                    Allergies











Allergy/AdvReac Type Severity Reaction Status Date / Time


 


banana [Banana] Allergy  Respiratory Verified 21 06:10





   Depression  


 


codeine Allergy  Cannot Verified 21 12:47





   Remember  


 


corn [Corn] Allergy  Respiratory Verified 21 06:10





   Depression  


 


iodine Allergy  Rash Verified 21 06:10


 


nut - unspecified Allergy  Swollen Verified 21 06:10





   Tongue  


 


oxycodone [Oxycodone] Allergy  Itching Verified 21 06:12


 


shellfish derived Allergy  Rash Verified 21 06:10


 


several food allergies Allergy  Various Uncoded 21 06:04





   Reactions  











Home Meds: 


                                    Home Meds





Albuterol/Ipratropium [DuoNeb 3.0-0.5 MG/3 ML] 3 ml INH QID PRN 17 

[History]


Budesonide [Pulmicort] 2 ml IH BID 17 [History]


Levothyroxine Sodium 50 mcg PO DAILY 17 [History]


Montelukast [Singulair] 10 mg PO BEDTIME 17 [History]


Omeprazole Magnesium [Prilosec Otc] 20 mg PO DAILY PRN 17 [History]


Calcium Carbonate/Vitamin D3 [Calcium 600-Vit D3 200 Tablet] 2 tab PO BID 

21 [History]


Ondansetron [Zofran ODT] 4 mg PO Q6H PRN #20 tab.dis 21 [Rx]


Potassium Chloride [Klor-Con M20] 20 meq PO DAILY #30 tab.er 21 [Rx]


Sulfamethoxazole/Trimethoprim [Septra DS] 1 tab PO MoWeFr@0900 #12 tablet 

21 [Rx]


allopurinoL [Zyloprim] 100 mg PO DAILY #30 tablet 21 [Rx]


valACYclovir HCl [Valtrex] 500 mg PO DAILY 30 Days #30 tablet 21 [Rx]


Acetaminophen [Tylenol Arthritis Pain] 650 mg PO Q6H PRN 21 [History]


Meloxicam 15 mg PO DAILY 21 [History]


dexAMETHasone [Dexamethasone] 20 mg PO Q7D 21 [History]


Baclofen 10 mg PO TID PRN #90 tablet 21 [Rx]


Docusate Sodium/Sennosides [Senokot-S] 1 each PO ASDIRECTED 21 [History]


Fluticasone Propionate [Flonase] 2 spray NASBOTH DAILY 21 [History]


Lidocaine [Lidoderm] 1 each TP 12 #30 adh..patch 21 [Rx]


allopurinoL [Zyloprim] 100 mg PO DAILY 21 [History]


oxyCODONE HCl [Roxicodone] 5 mg PO Q6H PRN 21 [History]











Past Medical History


HEENT History: Reports: Cataract


Respiratory History: Reports: Asthma, COPD


Gastrointestinal History: Reports: Colon Polyp, GERD


Other OB/GYN History: 


Musculoskeletal History: Reports: Arthritis, Fracture, Osteoarthritis, Other 

(See Below)


Other Musculoskeletal History: DJD


Psychiatric History: Reports: Anxiety


Endocrine/Metabolic History: Reports: Hypothyroidism, Obesity/BMI 30+


Hematologic History: Reports: Anemia, Blood Transfusion(s), Iron Deficiency, 

Other (See Below) (Thrombocytopenia)


Oncologic (Cancer) History: Reports: Bone (Multiple myeloma), Other (See Below)


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 

disease 21 multiple myeloma





- Infectious Disease History


Infectious Disease History: Reports: Chicken Pox, Measles, Mumps





- Past Surgical History


HEENT Surgical History: Reports: Adenoidectomy, Cataract Surgery, Tonsillectomy


Other HEENT Surgeries/Procedures: bilat cataract


GI Surgical History: Reports: Colonoscopy


Female  Surgical History: Reports: Hysterectomy, Salpingo-Oophorectomy


Musculoskeletal Surgical History: Reports: Arthroscopic Knee, Knee Replacement, 

Other (See Below)


Other Musculoskeletal Surgeries/Procedures:: partial R knee replacement, left 

hip replacement 21, left shoulder fracture 21





Social & Family History





- Tobacco Use


Tobacco Use Status *Q: Never Tobacco User





- Caffeine Use


Caffeine Use: Reports: None


Other Caffeine Use: one cup daily





- Alcohol Use


Alcohol Use History: No





- Living Situation & Occupation


Living situation: Reports: 





ED ROS GENERAL





- Review of Systems


Review Of Systems: See Below


Constitutional: Reports: Chills, Malaise, Weakness.  Denies: Fever


HEENT: Denies: Sinus Problem, Throat Pain, Throat Swelling


Respiratory: Reports: Shortness of Breath, Cough.  Denies: Sputum


Cardiovascular: Reports: Chest Pain, Dyspnea on Exertion, Lightheadedness


GI/Abdominal: Denies: Abdominal Pain, Nausea, Vomiting


: Denies: Dysuria, Hematuria


Musculoskeletal: Reports: Back Pain


Skin: Denies: Diaphoresis, Rash


Neurological: Reports: Dizziness, Weakness


Psychiatric: Reports: Anxiety


Hematologic/Lymphatic: Denies: Easy Bleeding, Easy Bruising





ED EXAM, GENERAL





- Physical Exam


Exam: See Below


Exam Limited By: No Limitations


General Appearance: Alert, WD/WN, Moderate Distress


Eye Exam: Bilateral Eye: EOMI, Normal Inspection (Sclera are anicteric), PERRL


Ears: Normal External Exam, Hearing Grossly Normal


Ear Exam: Bilateral Ear: Auricle Normal


Nose: Normal Inspection, Normal Mucosa, No Blood


Throat/Mouth: Normal Voice, No Airway Compromise, Other (Dry mucous membranes)


Head: Atraumatic, Normocephalic


Neck: Normal Inspection, Supple, Non-Tender, Full Range of Motion


Respiratory/Chest: Chest Non-Tender, Decreased Breath Sounds, Wheezing, 

Accessory Muscle Use, Other (Poor air movement)


Cardiovascular: Normal Peripheral Pulses, Regular Rate, Rhythm, No JVD, 

Tachycardia


Peripheral Pulses: 2+: Radial (L), Radial (R), Dorsalis Pedis (L), Dorsalis 

Pedis (R)


GI/Abdominal: Normal Bowel Sounds, Soft, Non-Tender, No Mass


Back Exam: Normal Inspection, Full Range of Motion


Extremities: Normal Inspection, Normal Range of Motion, Non-Tender, No Pedal 

Edema, Normal Capillary Refill


Neurological: Alert, Oriented, CN II-XII Intact, Normal Cognition, No 

Motor/Sensory Deficits


Psychiatric: Anxious


Skin Exam: Warm, Dry, Intact, Normal Color, No Rash


  ** #1 Interpretation


EKG Date: 21


Time: 17:08


Rhythm: Other (Sinus tachycardia)


Rate (Beats/Min): 109


Axis: Normal


P-Wave: Present


QRS: Normal


ST-T: Normal


QT: Normal


Comparison: No Change (Other than sinus tachycardia now, there is no significant

 change from an EKG performed on 2021.)





Course





- Vital Signs


Last Recorded V/S: 


                                Last Vital Signs











Temp  36.4 C   21 17:01


 


Pulse  112 H  21 17:01


 


Resp  24 H  21 17:01


 


BP  154/121 H  21 17:01


 


Pulse Ox  94 L  08/29/21 17:01














- Orders/Labs/Meds


Orders: 


                               Active Orders 24 hr











 Category Date Time Status


 


 Patient Status Manage Transfer [TRANSFER] Routine ADT  21 20:27 Active


 


 Patient Status [ADT] Routine ADT  21 20:16 Active


 


 Bedrest Bedside Commode [RC] ASDIRECTED Care  21 20:16 Active


 


 Cardiac Monitoring [RC] .As Directed Care  21 20:31 Active


 


 Height and Weight [RC] DAILY Care  21 20:16 Active


 


 Intake and Output [RC] QSHIFT Care  21 20:18 Active


 


 Oxygen Therapy [RC] PRN Care  21 20:16 Active


 


 Pulse Oximetry [RC] CONTINUOUS Care  21 20:19 Active


 


 RT Aerosol Therapy [RC] ASDIRECTED Care  21 20:23 Active


 


 VTE/DVT Education [RC] Per Unit Routine Care  21 20:16 Active


 


 Vital Signs [RC] Q4H Care  21 20:16 Active


 


 Regular Diet [DIET] Diet  21 Dinner Ordered


 


 Chest 1V Frontal [CR] Stat Exams  21 17:30 Taken


 


 BASIC METABOLIC PANEL,BMP [CHEM] AM Lab  21 05:11 Ordered


 


 CBC WITH AUTO DIFF [HEME] AM Lab  21 05:11 Ordered


 


 CULTURE BLOOD [BC] Urgent Lab  21 18:55 Received


 


 CULTURE BLOOD [BC] Urgent Lab  21 19:00 Received


 


 LACTIC ACID [CHEM] Stat Lab  21 20:31 Ordered


 


 Acetaminophen [TylenoL] Med  21 20:16 Active





 650 mg PO Q4H PRN   


 


 Albuterol/Ipratropium [DuoNeb 3.0-0.5 MG/3 ML] Med  21 21:00 Active





 3 ml NEB QIDRT   


 


 Ondansetron [Zofran] Med  21 20:16 Active





 4 mg IV Q6H PRN   


 


 Pharmacy to Dose - Vancomycin Med  21 20:30 Ordered





 1 dose .XX ASDIRECTED   


 


 Piperacillin/Tazobactam [Zosyn] 3.375 gm Med  21 20:30 Active





 Sodium Chloride 0.9% [Normal Saline] 50 ml   





 IV Q6H   


 


 Saccharomyces Boulardii [Florastor] Med  21 21:00 Active





 250 mg PO BID   


 


 Sodium Chloride 0.9% [Normal Saline] 1,000 ml Med  21 20:30 Active





 IV ASDIRECTED   


 


 Sodium Chloride 0.9% [Saline Flush] Med  21 17:30 Active





 10 ml FLUSH ASDIRECTED PRN   


 


 VANCOmycin 1 GM/200 ML 1 gm Med  21 21:00 Active





 Premix Bag 1 bag   





 IV Q24H   


 


 VANCOmycin 1.5 GM/300 ML 1.5 gm Med  21 21:30 Active





 Premix Bag 1 bag   





 IV ONETIME   


 


 methylPREDNISolone Sod Succ [Solu-MEDROL] Med  21 20:30 Active





 40 mg IVPUSH Q12H   


 


 Blood Culture x2 Reflex Set [OM.PC] Urgent Oth  21 18:51 Ordered


 


 Peripheral IV Insertion Adult [OM.PC] Routine Oth  21 17:30 Ordered


 


 VTE Mechanical Contraindications [AST] Per Unit Routine Oth  21 20:16 

Ordered


 


 VTE Pharmacological Contraindications [AST] Per Unit Oth  21 20:16 Ordere

d





 Routine   


 


 Resuscitation Status Routine Resus Stat  21 20:16 Ordered


 


 EKG 12 Lead [EK] Routine Ther  21 17:30 Ordered








                                Medication Orders





Acetaminophen (Acetaminophen 325 Mg Tab)  650 mg PO Q4H PRN


   PRN Reason: Pain (Mild 1-3)/fever


Albuterol/Ipratropium (Albuterol/Ipratropium 3.0-0.5 Mg/3 Ml Neb Soln)  3 ml NEB

 QIDRT WILIAM


Sodium Chloride (Normal Saline)  1,000 mls @ 100 mls/hr IV ASDIRECTED WILIAM


Piperacillin Sod/Tazobactam (Sod 3.375 gm/ Sodium Chloride)  50 mls @ 100 mls/hr

 IV Q6H WILIAM


Vancomycin HCl 1.5 gm/ Premix  300 mls @ 200 mls/hr IV ONETIME ONE


   Stop: 21 22:59


Vancomycin HCl 1 gm/ Premix  200 mls @ 200 mls/hr IV Q24H WILIAM


Methylprednisolone Sodium Succinate (Methylprednisolone Sodium Succinate 40 Mg/1

 Ml Sdv)  40 mg IVPUSH Q12H WILIAM


Ondansetron HCl (Ondansetron 4 Mg/2 Ml Sdv)  4 mg IV Q6H PRN


   PRN Reason: Nausea/Vomiting


Saccharomyces Boulardii (Saccharomyces Boulardii (Probiotic) 250 Mg Cap)  250 mg

 PO BID [facility]


Sodium Chloride (Sodium Chloride 0.9% 10 Ml Syringe)  10 ml FLUSH ASDIRECTED PRN


   PRN Reason: Keep Vein Open


   Last Admin: 21 17:12  Dose: 10 ml


   Documented by: RODOLFO


Vancomycin HCl (Pharmacy To Dose - Vancomycin)  1 dose .XX ASDIRECTED [facility]








Labs: 


                                Laboratory Tests











  21 Range/Units





  17:50 17:50 17:50 


 


WBC  4.5    (3.0-10.3)  x10-3/uL


 


RBC  3.11 L    (3.60-5.20)  x10(6)uL


 


Hgb  9.5 L    (11.4-15.5)  g/dL


 


Hct  28.6 L    (34.2-48.2)  %


 


MCV  91.7    (76.7-100.5)  fL


 


MCH  30.4    (23.9-33.9)  pg


 


MCHC  33.1    (31.9-34.8)  g/dL


 


RDW  23.7 H    (12.3-16.5)  %


 


Plt Count  15 L*    (151-488)  x10(3)uL


 


MPV  9.6    (7.1-12.4)  fL


 


Neut % (Auto)  78.1 H    (30.8-76.2)  %


 


Lymph % (Auto)  5.0 L    (18.4-52.1)  %


 


Mono % (Auto)  16.4 H    (4.4-15.7)  %


 


Eos % (Auto)  0.2 L    (0.6-8.1)  %


 


Baso % (Auto)  0.3    (0.2-1.5)  %


 


Neut # (Auto)  3.5    (1.5-6.3)  x10-3/uL


 


Lymph # (Auto)  0.2 L    (1.0-4.4)  x10-3/uL


 


Mono # (Auto)  0.7    (0.3-1.0)  x10-3/uL


 


Eos # (Auto)  0.0    (0.0-0.8)  x10-3/uL


 


Baso # (Auto)  0.0    (0.0-0.1)  x10-3/uL


 


D-Dimer, Quantitative   1.91 H   (0.0-0.59)  mg/LFEU


 


POC VBG pH     (7.32-7.43)  pH Units


 


POC VBG pCO2     (41-51)  mmHg


 


POC VBG HCO3     (22-29)  mmol/L


 


VBG Base Excess     (-2 - 3+)  mmol/L


 


O2 Delivery Device     


 


Oxygen Flow Rate     


 


Sodium    143  (135-145)  mmol/L


 


Potassium    4.4  (3.5-5.3)  mmol/L


 


Chloride    106  (100-110)  mmol/L


 


Carbon Dioxide    22  (21-32)  mmol/L


 


BUN    26 H  (7-18)  mg/dL


 


Creatinine    0.8  (0.55-1.02)  mg/dL


 


Est Cr Clr Drug Dosing    44.99  mL/min


 


Estimated GFR (MDRD)    > 60  (>60)  


 


BUN/Creatinine Ratio    32.5 H  (9-20)  


 


Glucose    91  ()  mg/dL


 


Lactic Acid     (0.4-2.0)  mmol/L


 


Calcium    9.2  (8.6-10.2)  mg/dL


 


Magnesium    1.7 L  (1.8-2.5)  mg/dL


 


Total Bilirubin    0.5  (0.1-1.3)  mg/dL


 


AST    32 H D  (5-25)  IU/L


 


ALT    19  D  (12-36)  U/L


 


Alkaline Phosphatase    72  ()  IU/L


 


Troponin I     (4.0-60.3)  pg/mL


 


C-Reactive Protein     (0.5-0.9)  mg/dL


 


NT-Pro-B Natriuret Pep     (<=125)  pg/mL


 


Total Protein    5.4 L  (6.0-8.0)  g/dL


 


Albumin    3.0 L  (3.2-4.6)  g/dL


 


Globulin    2.4  g/dL


 


Albumin/Globulin Ratio    1.3  


 


SARS-CoV-2 RNA (CATHERINE)     (NEGATIVE)  














  21 Range/Units





  17:50 17:50 18:05 


 


WBC     (3.0-10.3)  x10-3/uL


 


RBC     (3.60-5.20)  x10(6)uL


 


Hgb     (11.4-15.5)  g/dL


 


Hct     (34.2-48.2)  %


 


MCV     (76.7-100.5)  fL


 


MCH     (23.9-33.9)  pg


 


MCHC     (31.9-34.8)  g/dL


 


RDW     (12.3-16.5)  %


 


Plt Count     (151-488)  x10(3)uL


 


MPV     (7.1-12.4)  fL


 


Neut % (Auto)     (30.8-76.2)  %


 


Lymph % (Auto)     (18.4-52.1)  %


 


Mono % (Auto)     (4.4-15.7)  %


 


Eos % (Auto)     (0.6-8.1)  %


 


Baso % (Auto)     (0.2-1.5)  %


 


Neut # (Auto)     (1.5-6.3)  x10-3/uL


 


Lymph # (Auto)     (1.0-4.4)  x10-3/uL


 


Mono # (Auto)     (0.3-1.0)  x10-3/uL


 


Eos # (Auto)     (0.0-0.8)  x10-3/uL


 


Baso # (Auto)     (0.0-0.1)  x10-3/uL


 


D-Dimer, Quantitative     (0.0-0.59)  mg/LFEU


 


POC VBG pH     (7.32-7.43)  pH Units


 


POC VBG pCO2     (41-51)  mmHg


 


POC VBG HCO3     (22-29)  mmol/L


 


VBG Base Excess     (-2 - 3+)  mmol/L


 


O2 Delivery Device     


 


Oxygen Flow Rate     


 


Sodium     (135-145)  mmol/L


 


Potassium     (3.5-5.3)  mmol/L


 


Chloride     (100-110)  mmol/L


 


Carbon Dioxide     (21-32)  mmol/L


 


BUN     (7-18)  mg/dL


 


Creatinine     (0.55-1.02)  mg/dL


 


Est Cr Clr Drug Dosing     mL/min


 


Estimated GFR (MDRD)     (>60)  


 


BUN/Creatinine Ratio     (9-20)  


 


Glucose     ()  mg/dL


 


Lactic Acid   5.4 H*   (0.4-2.0)  mmol/L


 


Calcium     (8.6-10.2)  mg/dL


 


Magnesium     (1.8-2.5)  mg/dL


 


Total Bilirubin     (0.1-1.3)  mg/dL


 


AST     (5-25)  IU/L


 


ALT     (12-36)  U/L


 


Alkaline Phosphatase     ()  IU/L


 


Troponin I  6.4    (4.0-60.3)  pg/mL


 


C-Reactive Protein  2.6 H*    (0.5-0.9)  mg/dL


 


NT-Pro-B Natriuret Pep  465 H    (<=125)  pg/mL


 


Total Protein     (6.0-8.0)  g/dL


 


Albumin     (3.2-4.6)  g/dL


 


Globulin     g/dL


 


Albumin/Globulin Ratio     


 


SARS-CoV-2 RNA (CATHERINE)    Negative  (NEGATIVE)  














  21 Range/Units





  18:14 


 


WBC   (3.0-10.3)  x10-3/uL


 


RBC   (3.60-5.20)  x10(6)uL


 


Hgb   (11.4-15.5)  g/dL


 


Hct   (34.2-48.2)  %


 


MCV   (76.7-100.5)  fL


 


MCH   (23.9-33.9)  pg


 


MCHC   (31.9-34.8)  g/dL


 


RDW   (12.3-16.5)  %


 


Plt Count   (151-488)  x10(3)uL


 


MPV   (7.1-12.4)  fL


 


Neut % (Auto)   (30.8-76.2)  %


 


Lymph % (Auto)   (18.4-52.1)  %


 


Mono % (Auto)   (4.4-15.7)  %


 


Eos % (Auto)   (0.6-8.1)  %


 


Baso % (Auto)   (0.2-1.5)  %


 


Neut # (Auto)   (1.5-6.3)  x10-3/uL


 


Lymph # (Auto)   (1.0-4.4)  x10-3/uL


 


Mono # (Auto)   (0.3-1.0)  x10-3/uL


 


Eos # (Auto)   (0.0-0.8)  x10-3/uL


 


Baso # (Auto)   (0.0-0.1)  x10-3/uL


 


D-Dimer, Quantitative   (0.0-0.59)  mg/LFEU


 


POC VBG pH  7.48 H  (7.32-7.43)  pH Units


 


POC VBG pCO2  27 L  (41-51)  mmHg


 


POC VBG HCO3  20 L  (22-29)  mmol/L


 


VBG Base Excess  -3 L  (-2 - 3+)  mmol/L


 


O2 Delivery Device  Room air  


 


Oxygen Flow Rate  Not Reportable  


 


Sodium   (135-145)  mmol/L


 


Potassium   (3.5-5.3)  mmol/L


 


Chloride   (100-110)  mmol/L


 


Carbon Dioxide   (21-32)  mmol/L


 


BUN   (7-18)  mg/dL


 


Creatinine   (0.55-1.02)  mg/dL


 


Est Cr Clr Drug Dosing   mL/min


 


Estimated GFR (MDRD)   (>60)  


 


BUN/Creatinine Ratio   (9-20)  


 


Glucose   ()  mg/dL


 


Lactic Acid   (0.4-2.0)  mmol/L


 


Calcium   (8.6-10.2)  mg/dL


 


Magnesium   (1.8-2.5)  mg/dL


 


Total Bilirubin   (0.1-1.3)  mg/dL


 


AST   (5-25)  IU/L


 


ALT   (12-36)  U/L


 


Alkaline Phosphatase   ()  IU/L


 


Troponin I   (4.0-60.3)  pg/mL


 


C-Reactive Protein   (0.5-0.9)  mg/dL


 


NT-Pro-B Natriuret Pep   (<=125)  pg/mL


 


Total Protein   (6.0-8.0)  g/dL


 


Albumin   (3.2-4.6)  g/dL


 


Globulin   g/dL


 


Albumin/Globulin Ratio   


 


SARS-CoV-2 RNA (CATHERINE)   (NEGATIVE)  











Meds: 


Medications











Generic Name Dose Route Start Last Admin





  Trade Name Freq  PRN Reason Stop Dose Admin


 


Acetaminophen  650 mg  21 20:16 





  Acetaminophen 325 Mg Tab  PO  





  Q4H PRN  





  Pain (Mild 1-3)/fever  


 


Albuterol/Ipratropium  3 ml  21 21:00 





  Albuterol/Ipratropium 3.0-0.5 Mg/3 Ml Neb Soln  NEB  





  QIDRT WILIAM  


 


Sodium Chloride  1,000 mls @ 100 mls/hr  21 20:30 





  Normal Saline  IV  





  ASDIRECTED WILIAM  


 


Piperacillin Sod/Tazobactam  50 mls @ 100 mls/hr  21 20:30 





  Sod 3.375 gm/ Sodium Chloride  IV  





  Q6H WILIAM  


 


Vancomycin HCl 1.5 gm/ Premix  300 mls @ 200 mls/hr  21 21:30 





  IV  21 22:59 





  ONETIME ONE  


 


Vancomycin HCl 1 gm/ Premix  200 mls @ 200 mls/hr  21 21:00 





  IV  





  Q24H WILIAM  


 


Methylprednisolone Sodium Succinate  40 mg  21 20:30 





  Methylprednisolone Sodium Succinate 40 Mg/1 Ml Sdv  IVPUSH  





  Q12H WILIAM  


 


Ondansetron HCl  4 mg  21 20:16 





  Ondansetron 4 Mg/2 Ml Sdv  IV  





  Q6H PRN  





  Nausea/Vomiting  


 


Saccharomyces Boulardii  250 mg  21 21:00 





  Saccharomyces Boulardii (Probiotic) 250 Mg Cap  PO  





  BID WILIAM  


 


Sodium Chloride  10 ml  21 17:30  21 17:12





  Sodium Chloride 0.9% 10 Ml Syringe  FLUSH   10 ml





  ASDIRECTED PRN   Administration





  Keep Vein Open  


 


Vancomycin HCl  1 dose  21 20:30 





  Pharmacy To Dose - Vancomycin  .XX  





  ASDIRECTED WILIAM  














Discontinued Medications














Generic Name Dose Route Start Last Admin





  Trade Name Freq  PRN Reason Stop Dose Admin


 


Albuterol/Ipratropium  3 ml  21 17:34  21 17:46





  Albuterol/Ipratropium 3.0-0.5 Mg/3 Ml Neb Soln  NEB  21 17:35  3 ml





  ONETIME ONE   Administration


 


Sodium Chloride  500 mls @ 999 mls/hr  21 18:29  21 18:46





  Normal Saline  IV  21 18:59  999 mls/hr





  .BOLUS ONE   Administration


 


Sodium Chloride  500 mls @ 999 mls/hr  21 19:38  21 19:55





  Normal Saline  IV  21 20:08  999 mls/hr





  .BOLUS ONE   Administration


 


Lorazepam  0.5 mg  21 17:34  21 17:46





  Lorazepam 2 Mg/Ml Sdv  IVPUSH  21 17:35  0.5 mg





  ONETIME ONE   Administration


 


Lorazepam  0.5 mg  21 18:53  21 18:59





  Lorazepam 2 Mg/Ml Sdv  IVPUSH  21 18:54  0.5 mg





  ONETIME ONE   Administration


 


Methylprednisolone Sodium Succinate  125 mg  21 17:34  21 17:46





  Methylprednisolone Sodium Succinate 125 Mg/2 Ml Sdv  IVPUSH  21 17:35  

125 mg





  ONETIME ONE   Administration














- Radiology Interpretation


Free Text/Narrative:: 





Portable chest x-ray shows bilateral pleural effusions with the left being 

greater than the right. There also could be infiltrates and/or increase fluid as

 well.





- Re-Assessments/Exams


Free Text/Narrative Re-Assessment/Exam: 





21 18:45: White blood cell count is 4.5. Hemoglobin is 9.5. Platelet count

 is 15K read the serum electrolyte profile is normal area BUN is 26 creatinine 

is 0.8. Glucose is 91. Venous blood gas pH 7.48 and the PCO2 is 27. LFTs are 

normal. A troponin is normal. ProBNP is mildly elevated at 465. A CRP is 2.6. 

Lactic acid is 5.4. The portable chest x-ray shows bilateral pleural effusions 

and the one on the left is greater than the one on the right. The patient has 

received a DuoNeb and 0.5 mg of Ativan IV. Her breathing is improved. I will 

give the patient normal saline 500 mL of bolus. The d-dimer came back at 1.91. I

 will also need to do a CTA of her chest. I will need to give her some more 

Ativan to try to facilitate her being able to lie down.





21 19:15: The patient does have a severe contrast allergy. Therefore, I 

will not be send the patient for CTA of her chest. The patient has evidence of 

recurrent effusions and possible recurrent pneumonia. She has lactic acid and 

she has respiratory distress. She will need admission for treatment of these 

conditions and she will need IV hydration with careful monitoring of her fluid 

status. She will also need oncology specially services and radiology which are 

not available at [facility]. She is followed through [facility] 

oncology in [place] and I will call and discuss her case with them.





21 19:36: I called [facility] and they have no beds available 

tonight. They would not be able to accept the patient tonight area they do feel 

that they would have beds available in the morning to the noon hour. I will 

discuss this with the oncoming ED/hospitalist. For now, I will give the patient 

another 500 mL IV bolus of normal saline.





21 20:00: When I went in to evaluate the patient, she had O2 saturations 

in the 86% range on room air. I'll place her on the submental oxygen at 3 L/m 

via nasal cannula and her oxygen went up to 99%. He was visibly less dyspneic 

after this as well. I had ran the patient by Dr. Quan, the hospitalist who will

 be assuming care of the patient at 7 AM on 2021 and he was okay with the 

patient being admitted to our hospital. The patient is a DNR/DNI with her and 

the family.  [facility] has the patient on their list for a bed tomorrow 

and the patient would be able to be transferred tomorrow. Tonight, we will admit

 her with IV hydration, IV antibiotics, IV steroids, you'll nebs, supplemental 

oxygen and continued close monitoring of the patient. I discussed all this with 

the family and they are in agreement with this plan.








Departure





- Departure


Time of Disposition: 20:35


Disposition: Admitted As Inpatient 68


Condition: Fair (Guarded)


Clinical Impression: 


 Bilateral pleural effusion, Lactic acidemia, Immunocompromised state





Respiratory failure with hypoxia


Qualifiers:


 Chronicity: acute Qualified Code(s): J96.01 - Acute respiratory failure with 

hypoxia





Pneumonia


Qualifiers:


 Pneumonia type: due to unspecified organism Laterality: bilateral Lung locat

ion: lower lobe of lung Qualified Code(s): J18.9 - Pneumonia, unspecified 

organism





Sepsis


Qualifiers:


 Sepsis type: sepsis due to unspecified organism Sepsis acute organ dysfunction 

status: unspecified Qualified Code(s): A41.9 - Sepsis, unspecified organism





Multiple myeloma


Qualifiers:


 Multiple myeloma remission status: not in remission Qualified Code(s): C90.00 -

 Multiple myeloma not having achieved remission








- Discharge Information


Referrals: 


Strand,Duane, MD [Primary Care Provider] - 





Sepsis Event Note (ED)





- Focused Exam


Vital Signs: 


                                   Vital Signs











  Temp Pulse Resp BP Pulse Ox


 


 21 17:01  36.4 C  112 H  24 H  154/121 H  94 L














- My Orders


Last 24 Hours: 


My Active Orders





21 Dinner


Regular Diet [DIET] 





21 17:30


Chest 1V Frontal [CR] Stat 


Sodium Chloride 0.9% [Saline Flush]   10 ml FLUSH ASDIRECTED PRN 


Peripheral IV Insertion Adult [OM.PC] Routine 


EKG 12 Lead [EK] Routine 





21 18:51


Blood Culture x2 Reflex Set [OM.PC] Urgent 





21 18:55


CULTURE BLOOD [BC] Urgent 





21 19:00


CULTURE BLOOD [BC] Urgent 





21 20:16


Patient Status [ADT] Routine 


Bedrest Bedside Commode [RC] ASDIRECTED 


Height and Weight [RC] DAILY 


Oxygen Therapy [RC] PRN 


VTE/DVT Education [RC] Per Unit Routine 


Vital Signs [RC] Q4H 


Acetaminophen [TylenoL]   650 mg PO Q4H PRN 


Ondansetron [Zofran]   4 mg IV Q6H PRN 


VTE Mechanical Contraindications [AST] Per Unit Routine 


VTE Pharmacological Contraindications [AST] Per Unit Routine 


Resuscitation Status Routine 





21 20:18


Intake and Output [RC] QSHIFT 





21 20:19


Pulse Oximetry [RC] CONTINUOUS 





21 20:23


RT Aerosol Therapy [RC] ASDIRECTED 





21 20:27


Patient Status Manage Transfer [TRANSFER] Routine 





21 20:30


Pharmacy to Dose - Vancomycin   1 dose .XX ASDIRECTED 


Piperacillin/Tazobactam [Zosyn] 3.375 gm   Sodium Chloride 0.9% [Normal Saline] 

50 ml IV Q6H 


Sodium Chloride 0.9% [Normal Saline] 1,000 ml IV ASDIRECTED 


methylPREDNISolone Sod Succ [Solu-MEDROL]   40 mg IVPUSH Q12H 





21 20:31


Cardiac Monitoring [RC] .As Directed 


LACTIC ACID [CHEM] Stat 





21 21:00


Albuterol/Ipratropium [DuoNeb 3.0-0.5 MG/3 ML]   3 ml NEB QIDRT 


Saccharomyces Boulardii [Florastor]   250 mg PO BID 





21 21:30


VANCOmycin 1.5 GM/300 ML 1.5 gm   Premix Bag 1 bag IV ONETIME 





21 05:11


BASIC METABOLIC PANEL,BMP [CHEM] AM 


CBC WITH AUTO DIFF [HEME] AM 














- Assessment/Plan


Last 24 Hours: 


My Active Orders





21 Dinner


Regular Diet [DIET] 





21 17:30


Chest 1V Frontal [CR] Stat 


Sodium Chloride 0.9% [Saline Flush]   10 ml FLUSH ASDIRECTED PRN 


Peripheral IV Insertion Adult [OM.PC] Routine 


EKG 12 Lead [EK] Routine 





21 18:51


Blood Culture x2 Reflex Set [OM.PC] Urgent 





21 18:55


CULTURE BLOOD [BC] Urgent 





21 19:00


CULTURE BLOOD [BC] Urgent 





21 20:16


Patient Status [ADT] Routine 


Bedrest Bedside Commode [RC] ASDIRECTED 


Height and Weight [RC] DAILY 


Oxygen Therapy [RC] PRN 


VTE/DVT Education [RC] Per Unit Routine 


Vital Signs [RC] Q4H 


Acetaminophen [TylenoL]   650 mg PO Q4H PRN 


Ondansetron [Zofran]   4 mg IV Q6H PRN 


VTE Mechanical Contraindications [AST] Per Unit Routine 


VTE Pharmacological Contraindications [AST] Per Unit Routine 


Resuscitation Status Routine 





21 20:18


Intake and Output [RC] QSHIFT 





21 20:19


Pulse Oximetry [RC] CONTINUOUS 





21 20:23


RT Aerosol Therapy [RC] ASDIRECTED 





21 20:27


Patient Status Manage Transfer [TRANSFER] Routine 





21 20:30


Pharmacy to Dose - Vancomycin   1 dose .XX ASDIRECTED 


Piperacillin/Tazobactam [Zosyn] 3.375 gm   Sodium Chloride 0.9% [Normal Saline] 

50 ml IV Q6H 


Sodium Chloride 0.9% [Normal Saline] 1,000 ml IV ASDIRECTED 


methylPREDNISolone Sod Succ [Solu-MEDROL]   40 mg IVPUSH Q12H 





21 20:31


Cardiac Monitoring [RC] .As Directed 


LACTIC ACID [CHEM] Stat 





21 21:00


Albuterol/Ipratropium [DuoNeb 3.0-0.5 MG/3 ML]   3 ml NEB QIDRT 


Saccharomyces Boulardii [Florastor]   250 mg PO BID 





21 21:30


VANCOmycin 1.5 GM/300 ML 1.5 gm   Premix Bag 1 bag IV ONETIME 





21 05:11


BASIC METABOLIC PANEL,BMP [CHEM] AM 


CBC WITH AUTO DIFF [HEME] AM

## 2021-08-30 NOTE — PCM.OPNOTE
- General Post-Op/Procedure Note


Date of Surgery/Procedure: 08/30/21


Operative Procedure(s): Left Thoracentesis


Findings: 





1200 cc bloody serous fluid retrieved from left pleural space


Pre Op Diagnosis: Left Pleural Effusion


Post-Op Diagnosis: Same


Anesthesia Technique: Local


Primary Surgeon: Chance Jessica


Pathology: 





Left Pleural Fluid


EBL in mLs: 2


Complications: None


Condition: Good


Free Text/Narrative:: 


                                 Intake & Output











 08/30/21 08/30/21 08/30/21





 06:59 14:59 22:59


 


Intake Total  124 


 


Output Total 225  


 


Balance -225 124

## 2021-08-30 NOTE — HP
ADMISSION DATE:  08/29/2021

 

CHIEF COMPLAINT:  Respiratory difficulty with bilateral pleural effusions.

 

HISTORY OF PRESENT ILLNESS:  Melinda is a 75-year-old woman with a longstanding

history of chronic asthma.  She also has been diagnosed and treated for multiple

myeloma recently.  Two weeks ago, she was hospitalized at  in [place] for

breathing difficulty and she had 2 L thoracentesis from her right chest.

 

Senthil has been noticing increasing shortness of breath for the past few days at

home.  She has spoken with her oncologist, Dr. Rodriguez about this and he told

her to go to the emergency room if her breathing got worse.  She went to the

emergency room last evening at [facility] in [place].  She was found to

have bilateral pleural effusions and consult with [facility] revealed that

they did not have a bed available to take her as an admission, so she was

admitted to [facility].

 

The patient has not had fever, chills, sweats.  She is not having chest pain,

but has significant dyspnea even when sitting at rest.

 

PAST MEDICAL HISTORY:  Multiple myeloma.  This seems to have been diagnosed

around 2018.  She is getting chemotherapy every 2 weeks and has been limited

somewhat by low platelet count.  She has longstanding asthma, chronic essential

hypertension.  She is status post colonoscopy with polypectomies, hysterectomy,

tonsillectomy, arthroscopic surgery in right knee.  She has chronic

hypothyroidism and as mentioned the pleural effusions.  She has chronic allergic

rhinitis.

 

MEDICATIONS:

1. Valtrex 500 mg daily.

2. Umeclidinium bromide 1 puff daily.

3. Potassium 20 mEq daily.

4. DuoNebs q.i.d. p.r.n.

5. Albuterol inhaler p.r.n.

6. Tylenol p.r.n.

7. Flonase 2 squirts each naris daily.

8. Furosemide 40 mg p.o. daily.

9. Levothyroxine 50 mcg daily.

10.Singulair 10 mg at bedtime.

11.Omeprazole 20 mg daily.

12.Oxycodone 5 mg every 6 hours p.r.n. pain.

13.Potassium 20 mEq daily.

14.She has also been on dexamethasone and/or prednisone, dose not specified,

    likely in conjunction with her chemotherapy.

 

ALLERGIES:  Codeine, reaction not listed; iodine, rash; oxycodone, itching

listed; shellfish; and food allergies.

 

HABITS:  Nonsmoker and nondrinker.

 

FAMILY AND SOCIAL HISTORY:  The patient is , lives with her  in

[place].  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

 

REVIEW OF SYSTEMS:  No seizures or syncope.  No headache.  No recent change in

hearing or vision.  She does have a cough that is mostly nonproductive.  No

chest pain or palpitations.  No abdominal pain, nausea, or diarrhea.  No joint

inflammation.  She does report occasional swelling of her ankles.

 

PHYSICAL EXAMINATION:  GENERAL:  She is alert, comfortable, and oriented.  She

is tachypneic, sitting in the chair with short breaths and occasional cough.

VITAL SIGNS:  Blood pressure 136/60, pulse 98 and regular, respirations 20, O2

saturation 98% on 2 L nasal cannula, temperature 98.2.  Weight 141 pounds.  This

compares to a weight of 148 pounds back in July.  SKIN:  Anicteric.  Warm and

dry.  No sign of rash.  MOUTH:  Dry.  LUNGS:  Have clear breath sounds in the

upper part of both lungs, but absence of breath sounds, left lower 1/2 and right

base.  HEART:  Regular without murmur or gallop.  ABDOMEN:  Normal bowel sounds.

Soft and nontender.  EXTREMITIES:  Show no edema at the ankles.

 

LABORATORY DATA:  White count 6200, hemoglobin 9.6, platelets 17,000.  Sodium

142, potassium 5.0, BUN 28, creatinine 0.8.

 

ASSESSMENT:

1. Bilateral pleural effusions, now markedly symptomatic.

2. Multiple myeloma, undergoing chemotherapy every 2 weeks.

3. Longstanding asthma.

4. Hypothyroidism.

5. Chronic allergic rhinitis.

 

PLAN:  We will consult with Dr. Jessica regarding potential pleural effusions

and tap of these.  She does have an appointment for her next chemotherapy

session

up in [place] this week.  We will continue oxygen and symptomatic measures as well

as palliative care.

 

Job#: [id number]

DD: 08/30/2021 0821

DT: 08/30/2021 1409 RO/MODL

## 2021-08-30 NOTE — OR
DATE OF OPERATION:  08/30/2021

 

SURGEON:  Chance Jessica MD

 

PREOPERATIVE DIAGNOSIS:  Left pleural effusion.

 

POSTOPERATIVE DIAGNOSIS:  Left pleural effusion.

 

OPERATION PERFORMED:  Left thoracentesis.

 

INDICATIONS FOR SURGERY:  This 75-year-old female has developed a large left

pleural effusion.  This is interfering with her comfort of breathing and

thoracentesis is planned.

 

FINDINGS:  1200 mL of bloody serous fluid is aspirated from the left pleural

space.

 

PROCEDURE IN DETAIL:  With the patient sitting in her hospital room, the left

side of her back is prepped and draped, and a lower intercostal space

posteriorly is infiltrated with xylocaine.  A small incision is then made in the

skin and a Cook thoracentesis catheter is advanced into the left pleural cavity

through this posterior approach.  Using a large syringe and gentle suction, 1200

mL of fluid was aspirated from the left pleural space until fluid no longer

flowed.  The catheter was removed and sterile dressing was placed.  It was noted

that the patient had significant improvement in her respiratory status from this

procedure and tolerated it well.  Fluid is submitted for analysis as per her

hospitalist.  Postprocedure x-ray is obtained and reviewed.  No sign of 
complication

noted on the x-ray and it showed significant improvement in the pleural 
effusion.

 

Job#: [id number]

DD: 08/30/2021 1942

DT: 08/30/2021 2046 DI/TARAH FRYE

## 2021-08-30 NOTE — CR
CHEST ONE VIEW



INDICATION:  Difficulty breathing.



An AP upright portable view of the chest was obtained 08/29/2021 and compared 
with 07/19/2021 and 03/29/2021.  



Heart size is difficult to evaluate due to bilateral pleural effusions, large on
the left and moderate size on the right.  Likely there is cardiomegaly.  
Pulmonary vasculature appears to be somewhat prominent suggesting the 
possibility of CHF.  



The possibility of pneumonia and pleuritis cannot be excluded at the lung bases 
and left mid lung field, however.



Overlying EKG leads are noted.  



The aorta is tortuous.  There appears to be some minimal calcification in the 
arch of the aorta.



IMPRESSION:

1.  ASHD with probable cardiomegaly and mild CHF, possible interstitial lung 
edema of mild degree.

2.  Bilateral pleural effusions, larger on the left, which may be on the basis 
of the patient's probable CHF.  However, pneumonia and pleuritis cannot be 
excluded.  

3. Exogenous obesity.  

4. Proximal left humeral fracture with deformity is again noted.



When clinically possible, full inspiration PA and lateral views of the chest may
be helpful for further evaluation.  

MTDD

## 2021-08-31 NOTE — CR
INDICATION:  Post thoracentesis left.



CHEST, ONE VIEW: An AP upright portable view of the chest 08/30/21 compared with
08/29/21 and 07/19/21 revealed evidence of left thoracentesis without 
complicating process - no pneumothorax seen.  There remains a small left pleural
effusion and a small right pleural effusion as well.  Parenchymal changes may be
present at both lung bases - atelectatic and/or infiltrative.  Additionally, in 
the upper middle lung field on the right, there is an area of somewhat nodular-
appearing infiltrate again suggested.  However, this appears to be associated 
with the distal - anterior tip of the right second rib and could be on the basis
of previous trauma in that area, or even metastatic deposit.  



A new acute process is not identified.

MTDD

## 2021-09-01 NOTE — DISCH
DISCHARGE DATE:  08/31/2021

 

PRIMARY FINAL DIAGNOSIS:  Acute dyspnea secondary to pleural effusion.

 

OTHER DIAGNOSES:  Chronic asthma with chronic obstructive pulmonary disease,

multiple myeloma.

 

OPERATIONS:  Thoracentesis of 1400 mL of fluid accomplished by Dr. Chance Jessica.

 

SUMMARY:  Senthil is a 75-year-old with the above medical problems who came in with

increasing dyspnea over the past few days.  She has had a previous right

thoracentesis of 2 L of fluid while in [place] about 2 weeks ago.

 

On admission, she was found to have bilateral pleural effusions, left greater

than right.  She was markedly dyspneic.

 

Platelet count was 17,000, and it was elected to proceed with thoracentesis,

which was accomplished by Dr. Jessica.

 

By the next morning, Melinda was stable.  Chest x-ray showed marked improvement in

the pleural effusion without complications.

 

She is discharged in good condition to continue her usual medications and her

previously arranged follow up with her specialists and call us should there be

questions or problems prior to that time.

 

Job#: [id number]

DD: 09/01/2021 1109

DT: 09/01/2021 1128 RO/VLADIMIRL

## 2021-09-01 NOTE — DISCH
DISCHARGE DATE:  08/31/2021

 

PRIMARY FINAL DIAGNOSIS:  Acute pleural effusion.

 

OTHER DIAGNOSES:  Chronic long-term asthma with chronic obstructive pulmonary

disease, multiple myeloma, gastroesophageal reflux disease, hypothyroidism,

history of hyperuricemia.

 

OPERATIONS:  Therapeutic and diagnostic thoracentesis.  This was done by Dr. Jessica the evening of 08/30/2021 with 1,400 mL serosanguineous return and

good symptom improvement with a stable post-procedure chest x-ray.

 

SUMMARY:  Senthil is a 75-year-old woman who has been getting treatment for multiple

myeloma.  Last month, she was sent to the emergency room after her chemotherapy

treatment.  She was found to have a right pleural effusion and had 2,000 mL

thoracentesis.  The fluid was apparently negative for tumor cells and the

diagnosis was not clear.

 

Senthil was admitted through the emergency room for increasing shortness of breath.

She was found to have a large left pleural effusion that was subsequently tapped

as above.

 

This morning, she is feeling "100% better."  She is in good spirits.

 

PHYSICAL EXAMINATION:  VITAL SIGNS:  Blood pressure 121/57, pulse 76 and

regular, respirations 16, O2 saturation 95% on room air, temperature 97.7.

Weight 142 pounds.  LUNGS:  No breath sounds down to the left base with rales at

the lower 1/3.  She has diminished breath sounds at the right base.  HEART:

Regular.  EXTREMITIES:  Normal.

 

ASSESSMENT:

1. Right pleural effusion, tapped, cause unclear.

2. Multiple myeloma.

3. Chronic asthma with chronic obstructive pulmonary disease.

4. Hypothyroidism.

 

PLAN:  She is discharged to home in stable condition to continue her previously

scheduled followup for her multiple myeloma.  I have also asked her to make an

appointment with her regular provider, Heladio Shaikh, within 2 weeks for a

followup lung exam and chest x-ray.  Recheck sooner jesi

 

Job#: [id number]

DD: 08/31/2021 0858

DT: 09/01/2021 0005 RO/MODL